# Patient Record
Sex: FEMALE | Employment: OTHER | URBAN - METROPOLITAN AREA
[De-identification: names, ages, dates, MRNs, and addresses within clinical notes are randomized per-mention and may not be internally consistent; named-entity substitution may affect disease eponyms.]

---

## 2017-02-20 ENCOUNTER — OFFICE VISIT (OUTPATIENT)
Dept: OPHTHALMOLOGY | Facility: CLINIC | Age: 77
End: 2017-02-20
Attending: OPHTHALMOLOGY
Payer: MEDICARE

## 2017-02-20 DIAGNOSIS — H40.1190 POAG (PRIMARY OPEN-ANGLE GLAUCOMA): Primary | ICD-10-CM

## 2017-02-20 PROCEDURE — 92083 EXTENDED VISUAL FIELD XM: CPT | Mod: ZF | Performed by: OPHTHALMOLOGY

## 2017-02-20 PROCEDURE — 99212 OFFICE O/P EST SF 10 MIN: CPT | Mod: 25,ZF

## 2017-02-20 ASSESSMENT — VISUAL ACUITY
CORRECTION_TYPE: GLASSES
OD_CC: 20/20
OS_CC: 20/20
METHOD: SNELLEN - LINEAR
OD_CC+: -2

## 2017-02-20 ASSESSMENT — SLIT LAMP EXAM - LIDS
COMMENTS: NORMAL
COMMENTS: NORMAL

## 2017-02-20 ASSESSMENT — CUP TO DISC RATIO
OD_RATIO: 0.7
OS_RATIO: 0.8

## 2017-02-20 ASSESSMENT — REFRACTION_WEARINGRX
OD_CYLINDER: +1.00
OD_SPHERE: -1.00
OS_CYLINDER: +0.50
OS_AXIS: 013
OS_SPHERE: +1.00
OS_ADD: +2.25
OD_AXIS: 160
OD_ADD: +2.25

## 2017-02-20 ASSESSMENT — CONF VISUAL FIELD
OD_NORMAL: 1
OS_NORMAL: 1

## 2017-02-20 ASSESSMENT — TONOMETRY
OD_IOP_MMHG: 06
IOP_METHOD: APPLANATION
OS_IOP_MMHG: 13

## 2017-02-20 NOTE — PROGRESS NOTES
Primary open angle glaucoma (POAG) moderate  Maximum intraocular pressure 24 per patient    Ocular surgeries:   Trabeculectomy mitomycin-C and Cataract extraction with intraocular lens right eye (6/2/2015)   Trabeculectomy mitomycin-C left eye 1-10-12 (1 minute mitomycin-C and 3 flap sutures)    Ocular Rx:  No gtts    OVF 24-2   High false + right eye, scatter  Left eye reliable with inferior arcuate and decreased MD (possible cataract effect)    Assessment :    1) Primary open angle glaucoma (POAG) adv left eye   Trabeculectomy mitomycin-C left eye 1-10-12 (1 minute mitomycin-C and 3 flap sutures)  Trabeculectomy mitomycin-C and Cataract extraction with intraocular lens right eye (6/3/2015)    2) Cataract, left eye-mild  Monitor for now      Plan  Continue to observe off drops  RV 6 months intraocular pressure check, dilation and OCT retinal nerve fiber layer     Attending Physician Attestation:  Complete documentation of historical and exam elements from today's encounter can be found in the full encounter summary report (not reduplicated in this progress note). I personally obtained the chief complaint(s) and history of present illness. I confirmed and edited asnecessary the review of systems, past medical/surgical history, family history, social history, and examination findings as documented by others; and I examined the patient myself. I personally reviewed the relevant tests, images, and reports as documented above. I formulated and edited as necessary the assessment and plan and discussed the findings and management plan with the patient and family.  - Karma Bustillos MD 10:56 AM 2/20/2017

## 2017-02-20 NOTE — MR AVS SNAPSHOT
After Visit Summary   2/20/2017    Marli Ponce    MRN: [id number]           Patient Information     Date Of Birth          1940        Visit Information        Provider Department      2/20/2017 9:45 AM Karma Bustillos MD [facility]        Today's Diagnoses     POAG (primary open-angle glaucoma)    -  1       Follow-ups after your visit        Follow-up notes from your care team     Return in about 6 months (around 8/20/2017) for OCT rnfl, dilation.      Your next 10 appointments already scheduled     May 10, 2017 11:10 AM CDT   (Arrive by 10:55 AM)   PHYSICAL with Ana María Rincon MD   [facility] ([facility])    [address]  4th Floor  [facility] [address] [phone]            Aug 21, 2017  9:30 AM CDT   RETURN GLAUCOMA with Karma Bustillos MD   Eye Clinic ([facility])    [address]  [address]  9th [address] Clin 9a  [facility] [address] [phone]              Who to contact     Please call your clinic at [phone] to:    Ask questions about your health    Make or cancel appointments    Discuss your medicines    Learn about your test results    Speak to your doctor   If you have compliments or concerns about an experience at your clinic, or if you wish to file a complaint, please contact [facility] Patient Relations at [phone] or email us at [email or web address]         Additional Information About Your Visit        MyChart Information     [employer] gives you secure access to your electronic health record. If you see a primary care provider, you can also send messages to your care team and make appointments. If you have questions, please call your primary care clinic.  If you do not have a primary care provider, please call [phone] and they will assist you.      Slingbox is an electronic gateway that provides easy, online access to your medical  "records. With [employer], you can request a clinic appointment, read your test results, renew a prescription or communicate with your care team.     To access your existing account, please contact your [facility] or call [phone] for assistance.        Care EveryWhere ID     This is your Care EveryWhere ID. This could be used by other organizations to access your [place] medical records  [id number]         Blood Pressure from Last 3 Encounters:   09/26/16 158/82   07/25/16 172/78   05/19/16 175/82    Weight from Last 3 Encounters:   09/26/16 61.7 kg (136 lb)   07/25/16 62 kg (136 lb 9.6 oz)   05/19/16 59.4 kg (131 lb)              We Performed the Following     OVF 24-2 Dynamic OU        Primary Care Provider Office Phone # Fax #    Ana María Rincon [phone]        PHYSICIANS [address]  [facility] [address]        Thank you!     Thank you for choosing EYE CLINIC  for your care. Our goal is always to provide you with excellent care. Hearing back from our patients is one way we can continue to improve our services. Please take a few minutes to complete the written survey that you may receive in the mail after your visit with us. Thank you!             Your Updated Medication List - Protect others around you: Learn how to safely use, store and throw away your medicines at www.disposemymeds.org.          This list is accurate as of: 2/20/17 11:19 AM.  Always use your most recent med list.                   Brand Name Dispense Instructions for use    ADULT NUTRITIONAL SUPPLEMENT OR      Taking \"Estrovera\"       amLODIPine 5 MG tablet    NORVASC    90 tablet    Take 1 tablet (5 mg) by mouth daily       BIOTIN FORTE PO      Take by mouth daily       CALCIUM + D PO      Take 2 tablets by mouth daily.       fluticasone 50 MCG/ACT spray    FLONASE    1 Bottle    Spray 1-2 sprays into both nostrils daily       levothyroxine 50 MCG tablet    " SYNTHROID/LEVOTHROID    90 tablet    Take 1 tablet (50 mcg) by mouth daily       lisinopril 20 MG tablet    PRINIVIL/ZESTRIL    180 tablet    Take 2 tablets (40 mg) by mouth daily       multivitamin per tablet      Take 1 tablet by mouth daily.       omeprazole 20 MG CR capsule    priLOSEC    90 capsule    Take 1 capsule (20 mg) by mouth daily       REFRESH OP      Apply to eye as needed       simvastatin 40 MG tablet    ZOCOR    45 tablet    Take 0.5 tablets (20 mg) by mouth At Bedtime

## 2017-02-20 NOTE — NURSING NOTE
Chief Complaints and History of Present Illnesses   Patient presents with     Follow Up For     Primary open angle glaucoma (POAG) moderate     HPI    Affected eye(s):  Both   Symptoms:        Duration:  6 months   Frequency:  Constant       Do you have eye pain now?:  No      Comments:  Pt. States that she is doing well.  No change in VA BE,  No c/o comfort BE.  Hollie Alfred COT 10:06 AM February 20, 2017

## 2017-05-10 ENCOUNTER — OFFICE VISIT (OUTPATIENT)
Dept: INTERNAL MEDICINE | Facility: CLINIC | Age: 77
End: 2017-05-10

## 2017-05-10 VITALS
DIASTOLIC BLOOD PRESSURE: 82 MMHG | WEIGHT: 130.6 LBS | RESPIRATION RATE: 20 BRPM | HEART RATE: 57 BPM | BODY MASS INDEX: 24.68 KG/M2 | SYSTOLIC BLOOD PRESSURE: 144 MMHG | OXYGEN SATURATION: 97 %

## 2017-05-10 DIAGNOSIS — Z12.31 ENCOUNTER FOR SCREENING MAMMOGRAM FOR BREAST CANCER: ICD-10-CM

## 2017-05-10 DIAGNOSIS — Z00.00 HEALTH CARE MAINTENANCE: Primary | ICD-10-CM

## 2017-05-10 DIAGNOSIS — Z00.00 HEALTH CARE MAINTENANCE: ICD-10-CM

## 2017-05-10 DIAGNOSIS — M81.0 OSTEOPOROSIS: ICD-10-CM

## 2017-05-10 DIAGNOSIS — E03.9 HYPOTHYROIDISM, UNSPECIFIED TYPE: ICD-10-CM

## 2017-05-10 LAB
CHOLEST SERPL-MCNC: 294 MG/DL
CREAT SERPL-MCNC: 0.95 MG/DL (ref 0.52–1.04)
GFR SERPL CREATININE-BSD FRML MDRD: 57 ML/MIN/1.7M2
HDLC SERPL-MCNC: 146 MG/DL
LDLC SERPL CALC-MCNC: 136 MG/DL
NONHDLC SERPL-MCNC: 149 MG/DL
POTASSIUM SERPL-SCNC: 4.9 MMOL/L (ref 3.4–5.3)
TRIGL SERPL-MCNC: 64 MG/DL
TSH SERPL DL<=0.005 MIU/L-ACNC: 0.95 MU/L (ref 0.4–4)

## 2017-05-10 ASSESSMENT — PAIN SCALES - GENERAL: PAINLEVEL: NO PAIN (0)

## 2017-05-10 NOTE — MR AVS SNAPSHOT
After Visit Summary   5/10/2017    Marli Ponce    MRN: [id number]           Patient Information     Date Of Birth          1940        Visit Information        Provider Department      5/10/2017 11:10 AM Ana María Rincon MD [facility]        Today's Diagnoses     Health care maintenance    -  1    Hypothyroidism, unspecified type        Encounter for screening mammogram for breast cancer        Osteoporosis          Care Instructions    Primary Care Center Medication Refill Request Information:  * Please contact your pharmacy regarding ANY request for medication refills.  ** [facility]ax = [phone]  * Please allow 3 business days for routine medication refills.  * Please allow 5 business days for controlled substance medication refills.     Primary Care Center Test Result notification information:  *You will be notified with in 7-10 days of your appointment day regarding the results of your test.  If you are on MyChart you will be notified as soon as the provider has reviewed the results and signed off on them.    Primary Care Center [phone]   LABS TODAY    Marli was seen today for physical and recheck medication.    Diagnoses and all orders for this visit:    Health care maintenance  -     Lipid panel reflex to direct LDL; Future  -     Creatinine; Future  -     Potassium; Future    Hypothyroidism, unspecified type  -     TSH with free T4 reflex; Standing    Encounter for screening mammogram for breast cancer  -     Mammogram, routine screening; Future    Osteoporosis  -     Dexa hip/pelvis/spine*; Future              Follow-ups after your visit        Your next 10 appointments already scheduled     May 10, 2017 11:45 AM CDT   LAB with  LAB    Health Lab ([facility])    [address] [place] [address] [phone]           Patient must bring picture ID.  Patient should be prepared to give a urine  specimen  Please do not eat 10-12 hours before your appointment if you are coming in fasting for labs on lipids, cholesterol, or glucose (sugar).  Pregnant women should follow their Care Team instructions. Water with medications is okay. Do not drink coffee or other fluids.   If you have concerns about taking  your medications, please ask at office or if scheduling via Betweenhart, send a message by clicking on Secure Messaging, Message Your Care Team.            Aug 11, 2017 10:30 AM CDT   DX HIP/PELVIS/SPINE with UCDX1   [facility] ([facility])    [address] [place] [place] [address] [phone]           Please do not take any of the following 48 hours prior to your exam: vitamins, calcium tablets, antacids.            Aug 11, 2017 11:15 AM CDT   (Arrive by 11:00 AM)   MA SCREENING DIGITAL BILATERAL with UCBCMA1   [facility] ([facility])    [address] [place] [address] [phone]           Do not use any powder, lotion or deodorant under your arms or on your breast. If you do, we will ask you to remove it before your exam.  Wear comfortable, two-piece clothing.  If you have any allergies, tell your care team.  Bring any previous mammograms from other facilities or have them mailed to the breast center. This mammogram location, [facility], now offers 3D mammography. It doesn't replace a screening mammogram and can be done with a regular screening mammogram. It is optional and not all insurances will pay for it. 3D mammography is a special kind of mammogram that produces a three-dimensional image of the breast by using low dose-xrays. 3D allows the radiologist to see the breast tissue differently from 2D, which reduces the chance of repeat testing due to overlapping breast tissue. If you are interested in have a 3D mammogram, please check with  your insurance before you arrive for your exam. 3D mammography is offered to all patients. On the day of your exam you will be asked to sign a form stating yes, you wish to have 3D imaging or, no, you decline.            Aug 21, 2017  9:30 AM CDT   RETURN GLAUCOMA with Karma Bustillos MD   Eye Clinic ([facility])    [address]  [address] Clin [address]  [facility] [address] [phone]              Future tests that were ordered for you today     Open Standing Orders        Priority Remaining Interval Expires Ordered    TSH with free T4 reflex Routine 4/4 annual 5/10/2020 5/10/2017          Open Future Orders        Priority Expected Expires Ordered    Dexa hip/pelvis/spine* Routine  5/10/2018 5/10/2017    Mammogram, routine screening Routine  5/10/2018 5/10/2017    Creatinine Routine 5/10/2017 5/10/2018 5/10/2017    Potassium Routine 5/10/2017 5/24/2017 5/10/2017    Lipid panel reflex to direct LDL Routine  5/11/2018 5/10/2017            Who to contact     Please call your clinic at [phone] to:    Ask questions about your health    Make or cancel appointments    Discuss your medicines    Learn about your test results    Speak to your doctor   If you have compliments or concerns about an experience at your clinic, or if you wish to file a complaint, please contact [facility] Patient Relations at [phone] or email us at [email or web address]         Additional Information About Your Visit        [employer] Information     Pad[employer] gives you secure access to your electronic health record. If you see a primary care provider, you can also send messages to your care team and make appointments. If you have questions, please call your primary care clinic.  If you do not have a primary care provider, please call [phone] and they will assist you.      PadMatcher is an electronic gateway that provides easy, online access to your medical records.  With [employer], you can request a clinic appointment, read your test results, renew a prescription or communicate with your care team.     To access your existing account, please contact your [facility] or call [phone] for assistance.        Care EveryWhere ID     This is your Care EveryWhere ID. This could be used by other organizations to access your [place] medical records  [id number]        Your Vitals Were     Pulse Respirations Pulse Oximetry BMI (Body Mass Index)          57 20 97% 24.68 kg/m2         Blood Pressure from Last 3 Encounters:   05/10/17 144/82   09/26/16 158/82   07/25/16 172/78    Weight from Last 3 Encounters:   05/10/17 59.2 kg (130 lb 9.6 oz)   09/26/16 61.7 kg (136 lb)   07/25/16 62 kg (136 lb 9.6 oz)                 Today's Medication Changes          These changes are accurate as of: 5/10/17 11:37 AM.  If you have any questions, ask your nurse or doctor.               Stop taking these medicines if you haven't already. Please contact your care team if you have questions.     simvastatin 40 MG tablet   Commonly known as:  ZOCOR   Stopped by:  Ana María Rincon MD                    Primary Care Provider Office Phone # Fax #    Ana María Rincon [phone]       PRIMARY CARE CLINIC [address] [place] [address]        Thank you!     Thank you for choosing [facility] PRIMARY CARE CLINIC  for your care. Our goal is always to provide you with excellent care. Hearing back from our patients is one way we can continue to improve our services. Please take a few minutes to complete the written survey that you may receive in the mail after your visit with us. Thank you!             Your Updated Medication List - Protect others around you: Learn how to safely use, store and throw away your medicines at www.disposemymeds.org.          This list is accurate as of: 5/10/17 11:37 AM.  Always use your most recent med list.              "      Brand Name Dispense Instructions for use    ADULT NUTRITIONAL SUPPLEMENT OR      Taking \"Estrovera\"       amLODIPine 5 MG tablet    NORVASC    90 tablet    Take 1 tablet (5 mg) by mouth daily       BIOTIN FORTE PO      Take by mouth daily       CALCIUM + D PO      Take 2 tablets by mouth daily.       levothyroxine 50 MCG tablet    SYNTHROID/LEVOTHROID    90 tablet    Take 1 tablet (50 mcg) by mouth daily       lisinopril 20 MG tablet    PRINIVIL/ZESTRIL    180 tablet    Take 2 tablets (40 mg) by mouth daily       multivitamin per tablet      Take 1 tablet by mouth daily.       omeprazole 20 MG CR capsule    priLOSEC    90 capsule    Take 1 capsule (20 mg) by mouth daily       REFRESH OP      Apply to eye as needed         "

## 2017-05-10 NOTE — PATIENT INSTRUCTIONS
[facility] Medication Refill Request Information:  * Please contact your pharmacy regarding ANY request for medication refills.  ** [facility] Prescription Fax = [phone]  * Please allow 3 business days for routine medication refills.  * Please allow 5 business days for controlled substance medication refills.     [facility] Test Result notification information:  *You will be notified with in 7-10 days of your appointment day regarding the results of your test.  If you are on MyChart you will be notified as soon as the provider has reviewed the results and signed off on them.    [facility] [phone]   LABS TODAY    Marli was seen today for physical and recheck medication.    Diagnoses and all orders for this visit:    Health care maintenance  -     Lipid panel reflex to direct LDL; Future  -     Creatinine; Future  -     Potassium; Future    Hypothyroidism, unspecified type  -     TSH with free T4 reflex; Standing    Encounter for screening mammogram for breast cancer  -     Mammogram, routine screening; Future    Osteoporosis  -     Dexa hip/pelvis/spine*; Future

## 2017-07-18 DIAGNOSIS — I10 HTN (HYPERTENSION): ICD-10-CM

## 2017-07-18 RX ORDER — LISINOPRIL 20 MG/1
40 TABLET ORAL DAILY
Qty: 180 TABLET | Refills: 2 | Status: SHIPPED | OUTPATIENT
Start: 2017-07-18 | End: 2018-04-29

## 2017-07-18 NOTE — TELEPHONE ENCOUNTER
lisinopril  20MG      Last Written Prescription Date: 7/19/16  Last Fill Quantity: 180, # refills: 3  Last Office Visit : 5/10/17  Next Clinic Visit: NONE    Potassium   Date Value Ref Range Status   05/10/2017 4.9 3.4 - 5.3 mmol/L Final     Creatinine   Date Value Ref Range Status   05/10/2017 0.95 0.52 - 1.04 mg/dL Final     BP Readings from Last 3 Encounters:   05/10/17 144/82   09/26/16 158/82   07/25/16 172/78

## 2017-07-29 DIAGNOSIS — E03.9 HYPOTHYROIDISM: ICD-10-CM

## 2017-07-29 RX ORDER — LEVOTHYROXINE SODIUM 50 UG/1
50 TABLET ORAL DAILY
Qty: 90 TABLET | Refills: 2 | Status: SHIPPED | OUTPATIENT
Start: 2017-07-29 | End: 2018-04-29

## 2017-08-01 ENCOUNTER — OFFICE VISIT (OUTPATIENT)
Dept: OBGYN | Facility: CLINIC | Age: 77
End: 2017-08-01
Attending: OBSTETRICS & GYNECOLOGY
Payer: MEDICARE

## 2017-08-01 VITALS
SYSTOLIC BLOOD PRESSURE: 129 MMHG | HEIGHT: 61 IN | BODY MASS INDEX: 24.64 KG/M2 | HEART RATE: 76 BPM | DIASTOLIC BLOOD PRESSURE: 75 MMHG | WEIGHT: 130.5 LBS

## 2017-08-01 DIAGNOSIS — Z01.419 ENCOUNTER FOR GYNECOLOGICAL EXAMINATION WITHOUT ABNORMAL FINDING: ICD-10-CM

## 2017-08-01 PROCEDURE — 99212 OFFICE O/P EST SF 10 MIN: CPT | Mod: ZF

## 2017-08-01 ASSESSMENT — PAIN SCALES - GENERAL: PAINLEVEL: NO PAIN (0)

## 2017-08-01 NOTE — MR AVS SNAPSHOT
After Visit Summary   8/1/2017    Marli Ponce    MRN: [id number]           Patient Information     Date Of Birth          1940        Visit Information        Provider Department      8/1/2017 10:00 AM Cami Hearn MD [facility]        Today's Diagnoses     Encounter for gynecological examination without abnormal finding           Follow-ups after your visit        Follow-up notes from your care team     Return in 1 year (on 8/1/2018) for Preventative Health Visit.      Your next 10 appointments already scheduled     Aug 11, 2017 10:30 AM CDT   DX HIP/PELVIS/SPINE with UCDX1   [facility] ([facility])    [address] [place] [id number] [phone]           Please do not take any of the following 24 hours prior to the day of your exam: vitamins, calcium tablets, antacids.  If possible, please wear clothes without metal (snaps, zippers). A sweatsuit works well.            Aug 11, 2017 11:15 AM CDT   (Arrive by 11:00 AM)   MA SCREENING DIGITAL BILATERAL with UCBCMA1   [facility] ([facility])    [address] [place] [id number] [phone]           Do not use any powder, lotion or deodorant under your arms or on your breast. If you do, we will ask you to remove it before your exam.  Wear comfortable, two-piece clothing.  If you have any allergies, tell your care team.  Bring any previous mammograms from other facilities or have them mailed to the breast center. Three-dimensional (3D) mammograms are available at [place] locations in [facility], and [place]. [facility] locations include [facility] in [place]. Benefits of 3D mammograms include: - Improved rate of cancer detection - Decreases your chance of having to go back for more tests, which  "means fewer: - \"False-positive\" results (This means that there is an abnormal area but it isn't cancer.) - Invasive testing procedures, such as a biopsy or surgery - Can provide clearer images of the breast if you have dense breast tissue. 3D mammography is an optional exam that anyone can have with a 2D mammogram. It doesn't replace or take the place of a 2D mammogram. 2D mammograms remain an effective screening test for all women.  Not all insurance companies cover the cost of a 3D mammogram. Check with your insurance.            Aug 30, 2017  1:30 PM CDT   RETURN GLAUCOMA with Karma Bustillos MD   Eye Clinic ([facility])    [address]  [address]  9th Fl Clin 9a  [facility] [address] [phone]              Who to contact     Please call your clinic at [phone] to:    Ask questions about your health    Make or cancel appointments    Discuss your medicines    Learn about your test results    Speak to your doctor   If you have compliments or concerns about an experience at your clinic, or if you wish to file a complaint, please contact [facility] Patient Relations at [phone] or email us at [email or web address]         Additional Information About Your Visit        Ingenico Information     In[employer] gives you secure access to your electronic health record. If you see a primary care provider, you can also send messages to your care team and make appointments. If you have questions, please call your primary care clinic.  If you do not have a primary care provider, please call [phone] and they will assist you.      Ingenico is an electronic gateway that provides easy, online access to your medical records. With In[employer]ico, you can request a clinic appointment, read your test results, renew a prescription or communicate with your care team.     To access your existing account, please contact your [facility] or " "call [phone] for assistance.        Care EveryWhere ID     This is your Care EveryWhere ID. This could be used by other organizations to access your [place] medical records  [id number]        Your Vitals Were     Pulse Height BMI (Body Mass Index)             76 1.549 m (5' 1\") 24.66 kg/m2          Blood Pressure from Last 3 Encounters:   08/01/17 129/75   05/10/17 144/82   09/26/16 158/82    Weight from Last 3 Encounters:   08/01/17 59.2 kg (130 lb 8 oz)   05/10/17 59.2 kg (130 lb 9.6 oz)   09/26/16 61.7 kg (136 lb)              We Performed the Following     Pelvic and Breast Exam Procedure []        Primary Care Provider Office Phone # Fax #    Ana María Rincon [phone]       PRIMARY CARE CLINIC [address] [address]        Equal Access to Services     KEITH KELLEY : Hadii aad ku hadasho Soomaali, waaxda luqadaha, qaybta kaalmada adeegyada, waxay lathain hayshruthin rossy matthews . So [facility] [phone].    ATENCIÓN: Si tello donis, tiene a tate disposición servicios gratuitos de asistencia lingüística. Llame al [phone].    We comply with applicable federal civil rights laws and [place] laws. We do not discriminate on the basis of race, color, national origin, age, disability sex, sexual orientation or gender identity.            Thank you!     Thank you for choosing [facility]  for your care. Our goal is always to provide you with excellent care. Hearing back from our patients is one way we can continue to improve our services. Please take a few minutes to complete the written survey that you may receive in the mail after your visit with us. Thank you!             Your Updated Medication List - Protect others around you: Learn how to safely use, store and throw away your medicines at www.disposemymeds.org.          This list is accurate as of: 8/1/17 11:59 PM.  Always use your most recent med list.                   Brand Name " "Dispense Instructions for use Diagnosis    ADULT NUTRITIONAL SUPPLEMENT OR      Taking \"Estrovera\"        amLODIPine 5 MG tablet    NORVASC    90 tablet    Take 1 tablet (5 mg) by mouth daily    HTN (hypertension)       BIOTIN FORTE PO      Take by mouth daily        CALCIUM + D PO      Take 2 tablets by mouth daily.        levothyroxine 50 MCG tablet    SYNTHROID/LEVOTHROID    90 tablet    Take 1 tablet (50 mcg) by mouth daily    Hypothyroidism       lisinopril 20 MG tablet    PRINIVIL/ZESTRIL    180 tablet    Take 2 tablets (40 mg) by mouth daily    HTN (hypertension)       multivitamin per tablet      Take 1 tablet by mouth daily.        omeprazole 20 MG CR capsule    priLOSEC    90 capsule    Take 1 capsule (20 mg) by mouth daily    Esophageal reflux       REFRESH OP      Apply to eye as needed          "

## 2017-08-01 NOTE — LETTER
"8/1/2017       RE: Marli Ponce  [address]  [facility] [address]     Dear Colleague,    Thank you for referring your patient, Marli Ponce, to the [facility] at [facility]. Please see a copy of my visit note below.      Progress Note    SUBJECTIVE:  Marli Ponce is an 77 year old postmenopausal  who requests a breast and pelvic exam.  She is doing well today with no complaints.  Her menopausal symptoms have largely resolved.  She has not had any abnormal bleeding.      Patient is followed by Dr. Rincon for primary care.    Concerns today include: none    Menstrual History:  Menstrual History 6/7/2013   Reviewed Today Yes   Comments Menopause       Last    Lab Results   Component Value Date    PAP NIL 06/29/2012     History of abnormal Pap smear: NO - age 63 - see link Cervical Cytology Screening Guidelines      Last No results found for: HPV16  Last No results found for: HPV18  Last No results found for: HRHPV    Mammogram current: mammogram and DEXA are scheduled at the [facility]    HISTORY:  Prescription Medications as of 8/8/2017             levothyroxine (SYNTHROID/LEVOTHROID) 50 MCG tablet Take 1 tablet (50 mcg) by mouth daily    lisinopril (PRINIVIL/ZESTRIL) 20 MG tablet Take 2 tablets (40 mg) by mouth daily    BIOTIN FORTE PO Take by mouth daily    Polyvinyl Alcohol-Povidone (REFRESH OP) Apply to eye as needed    omeprazole (PRILOSEC) 20 MG capsule Take 1 capsule (20 mg) by mouth daily    Nutritional Supplements (ADULT NUTRITIONAL SUPPLEMENT OR) Taking \"Estrovera\"    amLODIPine (NORVASC) 5 MG tablet Take 1 tablet (5 mg) by mouth daily    Calcium Carbonate-Vitamin D (CALCIUM + D PO) Take 2 tablets by mouth daily.    Multiple Vitamin (MULTIVITAMIN) per tablet Take 1 tablet by mouth daily.        Allergies   Allergen Reactions     Cosopt [Dorzolamide-Timolol]      No effect       Lumigan      FBS, REDNESS     Xalatan [Latanoprost]  "     No effect       Immunization History   Administered Date(s) Administered     Hepatitis A Vac Ped/Adol-2 Dose 2005     Influenza (IIV3) 2008, 10/27/2011, 2012, 10/06/2013, 10/05/2014     Meningococcal (Menomune ) 2005     Pneumococcal (PCV 13) 2015     Pneumococcal 23 valent 2003, 2014     Poliovirus, inactivated (IPV) 2005     TD (ADULT, 7+) 2004     TDAP Vaccine (Boostrix) 2013     Tdap (Adacel,Boostrix) 2013     Typhoid IM 2005     Yellow Fever 2005     Zoster vaccine, live 2013       Obstetric History       T0      L5     SAB0   TAB0   Ectopic0   Multiple0   Live Births0      Past Medical History:   Diagnosis Date     Cataract      Hyperlipidemia LDL goal < 130      Hypertension     white coat; home blood pressure monitoring 2016  average systolic blood pressure approximately 115.     Hypothyroidism      Ocular hypertension      Primary open-angle glaucoma     adv     Past Surgical History:   Procedure Laterality Date     CHOLECYSTECTOMY       LAPAROSCOPIC APPENDECTOMY       LASER IRIDOTOMY OD (RIGHT EYE)      RE/LE  pre 2003     LASER IRIDOTOMY OS (LEFT EYE)  2010     SELECTIVE LASER TRABECULOPLASTY (SLT) OS (LEFT EYE)  2010    LE     TRABECULECTOMY, MITOMYCIN FILTER, COMBINED  1/10/2012    LE     Family History   Problem Relation Age of Onset     CANCER Mother 78     stomach and  at 80     Hypertension Mother      Glaucoma Mother      Breast Cancer Sister 38     still living     Hypertension Father      CEREBROVASCULAR DISEASE Father 52     Glaucoma Maternal Grandfather      Social History     Social History     Marital status:      Spouse name: N/A     Number of children: N/A     Years of education: N/A     Social History Main Topics     Smoking status: Never Smoker     Smokeless tobacco: Never Used     Alcohol use 0.0 oz/week     0 Standard drinks or equivalent per week       "Comment: rare tequilla     Drug use: No     Sexual activity: Yes     Partners: Male      Comment: 1962     Other Topics Concern     None     Social History Narrative    Homemaker has 5 children, 7 grand, 3 greatgrandHow much exercise per week? DailyHow much calcium per day? Supplement   How much caffeine per day? NoHow much vitamin D per day? SupplementDo you/your family wear seatbelts?  YesDo you/your family use safety helmets? NoDo you/your family use sunscreen? YesDo you/your family keep firearms in the home? YesDo you/your family have a smoke detector(s)? YesDo you feel safe in your home? YesHas anyone ever touched you in an unwanted manner? No Explain         How much exercise per week? 5     How much calcium per day? daily       How much caffeine per day? none    How much vitamin D per day? Supplement    Do you/your family wear seatbelts?  Yes    Do you/your family use safety helmets? No    Do you/your family use sunscreen? Yes    Do you/your family keep firearms in the home? Yes    Do you/your family have a smoke detector(s)? Yes        Do you feel safe in your home? Yes    Has anyone ever touched you in an unwanted manner? No     Explain     July 16, 2015 Lauren León LPN    Reviewed Gema DOLAN MA 8/1/2017                   ROS    EXAM:  Blood pressure 129/75, pulse 76, height 1.549 m (5' 1\"), weight 59.2 kg (130 lb 8 oz), not currently breastfeeding. Body mass index is 24.66 kg/(m^2).  General appearance: Pleasant female in no acute distress.     BREAST EXAM:  Breast: Without visible skin changes. No dimpling or lesions seen.   Breasts supple, non-tender with palpation, no dominant mass, nodularity, or nipple discharge noted bilaterally. Axillary nodes negative.      PELVIC EXAM:  EG/BUS: Normal genital architecture without lesions, erythema or abnormal secretions Bartholin's, Urethra, [place]'s normal   Urethral meatus: normal   Urethra: no masses, tenderness, or scarring   Bladder: no masses or tenderness "   Vagina: atrophic with scant physiologic secretions  Cervix: Multiparous, and no lesions  Uterus: midposition, and small, smooth, firm, mobile w/o pain  Adnexa: Within normal limits and No masses, nodularity, tenderness  Rectum:anus normal     ASSESSMENT:  Encounter Diagnosis   Name Primary?     Encounter for gynecological examination without abnormal finding       77 year old Female Pelvic and Breast Exam    PLAN:   Orders Placed This Encounter   Procedures     Pelvic and Breast Exam Procedure []     Return in one year/PRN for preventive care or problems/concerns.     Verbalized understanding and agreement with visit plan.  Cami Hearn MD, FACOG

## 2017-08-08 NOTE — PROGRESS NOTES
"  Progress Note    SUBJECTIVE:  Marli Ponce is an 77 year old postmenopausal  who requests a breast and pelvic exam.  She is doing well today with no complaints.  Her menopausal symptoms have largely resolved.  She has not had any abnormal bleeding.      Patient is followed by Dr. Rincon for primary care.    Concerns today include: none    Menstrual History:  Menstrual History 6/7/2013   Reviewed Today Yes   Comments Menopause       Last    Lab Results   Component Value Date    PAP NIL 06/29/2012     History of abnormal Pap smear: NO - age 63 - see link Cervical Cytology Screening Guidelines      Last No results found for: HPV16  Last No results found for: HPV18  Last No results found for: HRHPV    Mammogram current: mammogram and DEXA are scheduled at the [facility]    HISTORY:  Prescription Medications as of 8/8/2017             levothyroxine (SYNTHROID/LEVOTHROID) 50 MCG tablet Take 1 tablet (50 mcg) by mouth daily    lisinopril (PRINIVIL/ZESTRIL) 20 MG tablet Take 2 tablets (40 mg) by mouth daily    BIOTIN FORTE PO Take by mouth daily    Polyvinyl Alcohol-Povidone (REFRESH OP) Apply to eye as needed    omeprazole (PRILOSEC) 20 MG capsule Take 1 capsule (20 mg) by mouth daily    Nutritional Supplements (ADULT NUTRITIONAL SUPPLEMENT OR) Taking \"Estrovera\"    amLODIPine (NORVASC) 5 MG tablet Take 1 tablet (5 mg) by mouth daily    Calcium Carbonate-Vitamin D (CALCIUM + D PO) Take 2 tablets by mouth daily.    Multiple Vitamin (MULTIVITAMIN) per tablet Take 1 tablet by mouth daily.        Allergies   Allergen Reactions     Cosopt [Dorzolamide-Timolol]      No effect       Lumigan      FBS, REDNESS     Xalatan [Latanoprost]      No effect       Immunization History   Administered Date(s) Administered     Hepatitis A Vac Ped/Adol-2 Dose 07/26/2005     Influenza (IIV3) 12/03/2008, 10/27/2011, 08/26/2012, 10/06/2013, 10/05/2014     Meningococcal (Menomune ) 07/26/2005     Pneumococcal (PCV 13) 04/22/2015     " Pneumococcal 23 valent 2003, 2014     Poliovirus, inactivated (IPV) 2005     TD (ADULT, 7+) 2004     TDAP Vaccine (Boostrix) 2013     Tdap (Adacel,Boostrix) 2013     Typhoid IM 2005     Yellow Fever 2005     Zoster vaccine, live 2013       Obstetric History       T0      L5     SAB0   TAB0   Ectopic0   Multiple0   Live Births0      Past Medical History:   Diagnosis Date     Cataract      Hyperlipidemia LDL goal < 130      Hypertension     white coat; home blood pressure monitoring 2016  average systolic blood pressure approximately 115.     Hypothyroidism      Ocular hypertension      Primary open-angle glaucoma     adv     Past Surgical History:   Procedure Laterality Date     CHOLECYSTECTOMY       LAPAROSCOPIC APPENDECTOMY       LASER IRIDOTOMY OD (RIGHT EYE)      RE/LE  pre      LASER IRIDOTOMY OS (LEFT EYE)  2010     SELECTIVE LASER TRABECULOPLASTY (SLT) OS (LEFT EYE)  2010    LE     TRABECULECTOMY, MITOMYCIN FILTER, COMBINED  1/10/2012    LE     Family History   Problem Relation Age of Onset     CANCER Mother 78     stomach and  at 80     Hypertension Mother      Glaucoma Mother      Breast Cancer Sister 38     still living     Hypertension Father      CEREBROVASCULAR DISEASE Father 52     Glaucoma Maternal Grandfather      Social History     Social History     Marital status:      Spouse name: N/A     Number of children: N/A     Years of education: N/A     Social History Main Topics     Smoking status: Never Smoker     Smokeless tobacco: Never Used     Alcohol use 0.0 oz/week     0 Standard drinks or equivalent per week      Comment: rare tequilla     Drug use: No     Sexual activity: Yes     Partners: Male      Comment: 1962     Other Topics Concern     None     Social History Narrative    Homemaker has 5 children, 7 grand, 3 greatgrandHow much exercise per week? DailyHow much calcium per day? Supplement   How  "much caffeine per day? NoHow much vitamin D per day? SupplementDo you/your family wear seatbelts?  YesDo you/your family use safety helmets? NoDo you/your family use sunscreen? YesDo you/your family keep firearms in the home? YesDo you/your family have a smoke detector(s)? YesDo you feel safe in your home? YesHas anyone ever touched you in an unwanted manner? No Explain         How much exercise per week? 5     How much calcium per day? daily       How much caffeine per day? none    How much vitamin D per day? Supplement    Do you/your family wear seatbelts?  Yes    Do you/your family use safety helmets? No    Do you/your family use sunscreen? Yes    Do you/your family keep firearms in the home? Yes    Do you/your family have a smoke detector(s)? Yes        Do you feel safe in your home? Yes    Has anyone ever touched you in an unwanted manner? No     Explain     July 16, 2015 Lauren León LPN    Reviewed Gema DOLAN MA 8/1/2017                   ROS    EXAM:  Blood pressure 129/75, pulse 76, height 1.549 m (5' 1\"), weight 59.2 kg (130 lb 8 oz), not currently breastfeeding. Body mass index is 24.66 kg/(m^2).  General appearance: Pleasant female in no acute distress.     BREAST EXAM:  Breast: Without visible skin changes. No dimpling or lesions seen.   Breasts supple, non-tender with palpation, no dominant mass, nodularity, or nipple discharge noted bilaterally. Axillary nodes negative.      PELVIC EXAM:  EG/BUS: Normal genital architecture without lesions, erythema or abnormal secretions Bartholin's, Urethra, [place]'s normal   Urethral meatus: normal   Urethra: no masses, tenderness, or scarring   Bladder: no masses or tenderness   Vagina: atrophic with scant physiologic secretions  Cervix: Multiparous, and no lesions  Uterus: midposition, and small, smooth, firm, mobile w/o pain  Adnexa: Within normal limits and No masses, nodularity, tenderness  Rectum:anus normal       ASSESSMENT:  Encounter Diagnosis   Name Primary? "     Encounter for gynecological examination without abnormal finding       77 year old Female Pelvic and Breast Exam    PLAN:   Orders Placed This Encounter   Procedures     Pelvic and Breast Exam Procedure []       Return in one year/PRN for preventive care or problems/concerns.     Verbalized understanding and agreement with visit plan.    Cami Hearn MD, FACOG

## 2017-08-16 DIAGNOSIS — M85.9 LOW BONE DENSITY: ICD-10-CM

## 2017-08-16 DIAGNOSIS — M85.9 LOW BONE DENSITY: Primary | ICD-10-CM

## 2017-08-16 DIAGNOSIS — M81.0 AGE RELATED OSTEOPOROSIS, UNSPECIFIED PATHOLOGICAL FRACTURE PRESENCE: Primary | ICD-10-CM

## 2017-08-16 RX ORDER — ALENDRONATE SODIUM 70 MG/1
70 TABLET ORAL
Qty: 4 TABLET | Refills: 3 | Status: SHIPPED | OUTPATIENT
Start: 2017-08-16 | End: 2017-09-21 | Stop reason: SINTOL

## 2017-08-30 ENCOUNTER — OFFICE VISIT (OUTPATIENT)
Dept: OPHTHALMOLOGY | Facility: CLINIC | Age: 77
End: 2017-08-30
Attending: OPHTHALMOLOGY
Payer: MEDICARE

## 2017-08-30 DIAGNOSIS — H40.1190 POAG (PRIMARY OPEN-ANGLE GLAUCOMA): Primary | ICD-10-CM

## 2017-08-30 DIAGNOSIS — H25.12 AGE-RELATED NUCLEAR CATARACT OF LEFT EYE: ICD-10-CM

## 2017-08-30 PROCEDURE — 99213 OFFICE O/P EST LOW 20 MIN: CPT | Mod: 25,ZF

## 2017-08-30 PROCEDURE — 92015 DETERMINE REFRACTIVE STATE: CPT | Mod: GY,ZF

## 2017-08-30 PROCEDURE — 92133 CPTRZD OPH DX IMG PST SGM ON: CPT | Mod: ZF | Performed by: OPHTHALMOLOGY

## 2017-08-30 ASSESSMENT — REFRACTION_WEARINGRX
OS_SPHERE: +1.00
OD_SPHERE: -1.00
OS_CYLINDER: +0.50
OD_ADD: +2.25
OS_AXIS: 013
OD_AXIS: 160
OD_CYLINDER: +1.00
OS_ADD: +2.25

## 2017-08-30 ASSESSMENT — VISUAL ACUITY
METHOD_MR: OPPOSITE SIGNS CORRECT.
OS_CC: 20/30
OD_CC+: -2
METHOD: SNELLEN - LINEAR
CORRECTION_TYPE: GLASSES
OS_PH_CC: 20/25
OD_CC: 20/25

## 2017-08-30 ASSESSMENT — TONOMETRY
IOP_METHOD: APPLANATION
IOP_METHOD: APPLANATION
OD_IOP_MMHG: 08
OS_IOP_MMHG: 16
OS_IOP_MMHG: 16
OD_IOP_MMHG: 09

## 2017-08-30 ASSESSMENT — CUP TO DISC RATIO
OS_RATIO: 0.8
OD_RATIO: 0.7

## 2017-08-30 ASSESSMENT — SLIT LAMP EXAM - LIDS
COMMENTS: NORMAL
COMMENTS: NORMAL

## 2017-08-30 ASSESSMENT — REFRACTION_MANIFEST
OS_ADD: +2.75
OD_AXIS: 170
OS_CYLINDER: +1.50
OD_ADD: +2.75
OS_AXIS: 010
OD_SPHERE: -0.75
OD_CYLINDER: +1.75
OS_SPHERE: +0.75

## 2017-08-30 ASSESSMENT — CONF VISUAL FIELD
METHOD: COUNTING FINGERS
OS_NORMAL: 1
OD_NORMAL: 1

## 2017-08-30 NOTE — NURSING NOTE
Chief Complaints and History of Present Illnesses   Patient presents with     Glaucoma Follow Up     6 month follow up both eyes.     HPI    Affected eye(s):  Both   Symptoms:     No floaters   No flashes   No redness   No Dryness         Do you have eye pain now?:  No      Comments:  Pt having difficulty reading small print even with glasses.    Kassidy SMART August 30, 2017 1:43 PM

## 2017-08-30 NOTE — PROGRESS NOTES
Primary open angle glaucoma (POAG) moderate  Maximum intraocular pressure 24 per patient    Vision stable except getting harder to read things up close in the left eye. Eyes comfortable. No drops    Ocular surgeries:   Trabeculectomy mitomycin-C and Cataract extraction with intraocular lens right eye (6/2/2015)   Trabeculectomy mitomycin-C left eye 1-10-12 (1 minute mitomycin-C and 3 flap sutures)    Ocular Rx:  No gtts    OCT RNFL   OD significant thinning compared to 3/2014 (pt had phaco/trab OD in 6/2015)   OS stable compared to 3/2014    Assessment :    1) Primary open angle glaucoma (POAG) adv left eye   Trabeculectomy mitomycin-C left eye 1-10-12 (1 minute mitomycin-C and 3 flap sutures)  Trabeculectomy mitomycin-C and Cataract extraction with intraocular lens right eye (6/3/2015)    2) Cataract, left eye-mild  Likely visually significant  Pt prefers to monitor for now and requests updated MRx    Plan  Continue to observe off drops  Dispensed updated MRx today  RV 6 months intraocular pressure check and 24-2 both eyes    Attending Physician Attestation:  Complete documentation of historical and exam elements from today's encounter can be found in the full encounter summary report (not reduplicated in this progress note). I personally obtained the chief complaint(s) and history of present illness. I confirmed and edited asnecessary the review of systems, past medical/surgical history, family history, social history, and examination findings as documented by others; and I examined the patient myself. I personally reviewed the relevant tests, images, and reports as documented above. I formulated and edited as necessary the assessment and plan and discussed the findings and management plan with the patient and family.  - Karma Bustillos MD 2:37 PM 8/30/2017

## 2017-08-30 NOTE — MR AVS SNAPSHOT
After Visit Summary   8/30/2017    Marli Ponce    MRN: [id number]           Patient Information     Date Of Birth          1940        Visit Information        Provider Department      8/30/2017 1:30 PM Karma Bustillos MD [facility]        Today's Diagnoses     POAG (primary open-angle glaucoma)    -  1    Age-related nuclear cataract of left eye           Follow-ups after your visit        Follow-up notes from your care team     Return in about 6 months (around 2/28/2018) for visual field 24-2.      Your next 10 appointments already scheduled     Sep 21, 2017  1:15 PM CDT   (Arrive by 1:00 PM)   RETURN HIP with Negro Williamson MD   [facility] ([facility])    [address]  4th Floor  [facility] [id number] [phone]            Feb 28, 2018 12:00 PM CST   VISUAL FIELD with [facility] EYE VISUAL FIELD   Eye Clinic ([facility])    [address]  [address]  [facility] [address] [phone]            Feb 28, 2018 12:30 PM CST   RETURN GLAUCOMA with Karma Bustillos MD   Eye Clinic ([facility])    [address]  [address]  [facility] [address] [phone]              Who to contact     Please call your clinic at [phone] to:    Ask questions about your health    Make or cancel appointments    Discuss your medicines    Learn about your test results    Speak to your doctor   If you have compliments or concerns about an experience at your clinic, or if you wish to file a complaint, please contact [facility] Patient Relations at [phone] or email us at [email or web address]         Additional Information About Your Visit        MyChart Information     [employer] gives you secure access to your electronic health record. If you see a primary care provider, you can also send messages to your care team and make  appointments. If you have questions, please call your primary care clinic.  If you do not have a primary care provider, please call [phone] and they will assist you.      [employer] is an electronic gateway that provides easy, online access to your medical records. With [employer], you can request a clinic appointment, read your test results, renew a prescription or communicate with your care team.     To access your existing account, please contact your [facility] or call [phone] for assistance.        Care EveryWhere ID     This is your Care EveryWhere ID. This could be used by other organizations to access your [facility] medical records  [id number]         Blood Pressure from Last 3 Encounters:   08/01/17 129/75   05/10/17 144/82   09/26/16 158/82    Weight from Last 3 Encounters:   08/01/17 59.2 kg (130 lb 8 oz)   05/10/17 59.2 kg (130 lb 9.6 oz)   09/26/16 61.7 kg (136 lb)              We Performed the Following     OCT Optic Nerve RNFL Spectralis OU (both eyes)        Primary Care Provider Office Phone # Fax #    Ana María Rincon [phone] [address] [address]        Equal Access to Services     KEITH KELLEY : Hadii aad ku hadasho Soomaali, waaxda luqadaha, qaybta kaalmada adeegyada, waxay regino hayjayro bhat. So [facility] [phone].    ATENCIÓN: Si habla español, tiene a tate disposición servicios gratuitos de asistencia lingüística. Llame al [phone].    We comply with applicable federal civil rights laws and [place] laws. We do not discriminate on the basis of race, color, national origin, age, disability sex, sexual orientation or gender identity.            Thank you!     Thank you for choosing EYE CLINIC  for your care. Our goal is always to provide you with excellent care. Hearing back from our patients is one way we can continue to improve our services. Please take a few minutes to complete the  "written survey that you may receive in the mail after your visit with us. Thank you!             Your Updated Medication List - Protect others around you: Learn how to safely use, store and throw away your medicines at www.disposemymeds.org.          This list is accurate as of: 8/30/17  2:43 PM.  Always use your most recent med list.                   Brand Name Dispense Instructions for use Diagnosis    ADULT NUTRITIONAL SUPPLEMENT OR      Taking \"Estrovera\"        alendronate 70 MG tablet    FOSAMAX    4 tablet    Take 1 tablet (70 mg) by mouth every 7 days    Age related osteoporosis, unspecified pathological fracture presence       amLODIPine 5 MG tablet    NORVASC    90 tablet    Take 1 tablet (5 mg) by mouth daily    HTN (hypertension)       BIOTIN FORTE PO      Take by mouth daily        CALCIUM + D PO      Take 2 tablets by mouth daily.        calcium 600-200 MG-UNIT Tabs     180 tablet    Take 1 tablet by mouth 2 times daily        levothyroxine 50 MCG tablet    SYNTHROID/LEVOTHROID    90 tablet    Take 1 tablet (50 mcg) by mouth daily    Hypothyroidism       lisinopril 20 MG tablet    PRINIVIL/ZESTRIL    180 tablet    Take 2 tablets (40 mg) by mouth daily    HTN (hypertension)       multivitamin per tablet      Take 1 tablet by mouth daily.        omeprazole 20 MG CR capsule    priLOSEC    90 capsule    Take 1 capsule (20 mg) by mouth daily    Esophageal reflux       REFRESH OP      Apply to eye as needed          "

## 2017-09-06 ENCOUNTER — DOCUMENTATION ONLY (OUTPATIENT)
Dept: OTHER | Facility: CLINIC | Age: 77
End: 2017-09-06

## 2017-09-06 PROBLEM — Z71.89 ACP (ADVANCE CARE PLANNING): Chronic | Status: ACTIVE | Noted: 2017-09-06

## 2017-09-19 DIAGNOSIS — M25.551 HIP PAIN, RIGHT: Primary | ICD-10-CM

## 2017-09-21 ENCOUNTER — OFFICE VISIT (OUTPATIENT)
Dept: ORTHOPEDICS | Facility: CLINIC | Age: 77
End: 2017-09-21

## 2017-09-21 ENCOUNTER — TELEPHONE (OUTPATIENT)
Dept: INTERNAL MEDICINE | Facility: CLINIC | Age: 77
End: 2017-09-21

## 2017-09-21 ENCOUNTER — MYC MEDICAL ADVICE (OUTPATIENT)
Dept: INTERNAL MEDICINE | Facility: CLINIC | Age: 77
End: 2017-09-21

## 2017-09-21 VITALS — BODY MASS INDEX: 25.82 KG/M2 | HEIGHT: 60 IN | WEIGHT: 131.5 LBS

## 2017-09-21 DIAGNOSIS — M81.0 AGE-RELATED OSTEOPOROSIS WITHOUT CURRENT PATHOLOGICAL FRACTURE: Primary | ICD-10-CM

## 2017-09-21 DIAGNOSIS — M25.551 CHRONIC PAIN OF RIGHT HIP: Primary | ICD-10-CM

## 2017-09-21 DIAGNOSIS — G89.29 CHRONIC PAIN OF RIGHT HIP: Primary | ICD-10-CM

## 2017-09-21 ASSESSMENT — ACTIVITIES OF DAILY LIVING (ADL)
ADL_MEAN: 2
ADL_SUBSCALE_SCORE: 50
ADL_SUM: 34

## 2017-09-21 ASSESSMENT — HOOS S4: HOW SEVERE IS YOUR HIP JOINT STIFFNESS AFTER FIRST WAKENING IN THE MORNING?: MODERATE

## 2017-09-21 NOTE — NURSING NOTE
"Reason For Visit:   Chief Complaint   Patient presents with     RECHECK     Pt. states that she is here today for Right Hip Pain. She mention that a week ago, she walked 4 miles up hills and thats when she started noticed incraes of pain from her hip. She had tried Physical Therapy in the past, effective.        Pain Assessment  Patient Currently in Pain: Yes  Primary Pain Location: Hip  Pain Orientation: Right  Pain Descriptors: Discomfort  Alleviating Factors: Rest, NSAIDS  Aggravating Factors: Movement, Standing, Walking, Sitting               HEIGHT: 5' 0\", WEIGHT: 131 lbs 8 oz, BMI: Body mass index is 25.68 kg/(m^2).      Current Outpatient Prescriptions   Medication Sig Dispense Refill     calcium 600-200 MG-UNIT TABS Take 1 tablet by mouth 2 times daily 180 tablet 3     levothyroxine (SYNTHROID/LEVOTHROID) 50 MCG tablet Take 1 tablet (50 mcg) by mouth daily 90 tablet 2     lisinopril (PRINIVIL/ZESTRIL) 20 MG tablet Take 2 tablets (40 mg) by mouth daily 180 tablet 2     BIOTIN FORTE PO Take by mouth daily       Polyvinyl Alcohol-Povidone (REFRESH OP) Apply to eye as needed       omeprazole (PRILOSEC) 20 MG capsule Take 1 capsule (20 mg) by mouth daily 90 capsule 1     Nutritional Supplements (ADULT NUTRITIONAL SUPPLEMENT OR) Taking \"Estrovera\"       amLODIPine (NORVASC) 5 MG tablet Take 1 tablet (5 mg) by mouth daily 90 tablet 3     Calcium Carbonate-Vitamin D (CALCIUM + D PO) Take 2 tablets by mouth daily.       Multiple Vitamin (MULTIVITAMIN) per tablet Take 1 tablet by mouth daily.            Allergies   Allergen Reactions     Cosopt [Dorzolamide-Timolol]      No effect       Lumigan      FBS, REDNESS     Xalatan [Latanoprost]      No effect                 "

## 2017-09-21 NOTE — PROGRESS NOTES
Patient returns today to discuss her hip. She continues to have latera hip pain. She has done some physical therapy. She reports that she somewhat improved and she is able to walk up to 5 miles at a time. She continues to have trouble with lateral hip pain when walking hills. She is primarily interested in discussing her DEXA scan. We discussed the use of bisphosphonates and Ca and Vit D. On physical examination her symptoms are reproducible with palpation at the level of the greater trochanter.   Assessment: 1) continued symptoms at the greater trochanter that are being reasonable not obviously not completely addressed with a physical therapy program; she would like to revisit her physical therapist because she reports that the number of exercises that she has are . 2) decreased bone density.   Plan: She is going to see her physical therapy for an update on her HEP and she is going to consider the use of bisphosphonate to address her diminished bone density.     Fifteen minutes were spent with the patient with greater than 50% on counseling and coordination of care.

## 2017-09-21 NOTE — TELEPHONE ENCOUNTER
----- Message from Matthieu Villa sent at 9/20/2017  3:00 PM CDT -----  Regarding: Rx question  Contact: [phone]  Pt would like to speak with you about her Rx alendronate (FOSAMAX) 70 MG tablet    She states she wants to discontinue this because it makes her stomach feel upset and she feels terrible. Wants to let Dr. Rincon know.     Message left for pt to call back.  Lindy Campos RN 8:28 AM on 9/21/2017.

## 2017-09-21 NOTE — LETTER
9/21/2017       RE: Marli Ponce  [address]  [facility] [address]     Dear Colleague,    Thank you for referring your patient, Marli Ponce, to the [facility]THOP[facility]IC at [facility]. Please see a copy of my visit note below.    Patient returns today to discuss her hip. She continues to have latera hip pain. She has done some physical therapy. She reports that she somewhat improved and she is able to walk up to 5 miles at a time. She continues to have trouble with lateral hip pain when walking hills. She is primarily interested in discussing her DEXA scan. We discussed the use of bisphosphonates and Ca and Vit D. On physical examination her symptoms are reproducible with palpation at the level of the greater trochanter.   Assessment: 1) continued symptoms at the greater trochanter that are being reasonable not obviously not completely addressed with a physical therapy program; she would like to revisit her physical therapist because she reports that the number of exercises that she has are . 2) decreased bone density.   Plan: She is going to see her physical therapy for an update on her HEP and she is going to consider the use of bisphosphonate to address her diminished bone density.     Fifteen minutes were spent with the patient with greater than 50% on counseling and coordination of care.       Again, thank you for allowing me to participate in the care of your patient.      Sincerely,    Negro Williamson MD

## 2017-09-21 NOTE — MR AVS SNAPSHOT
After Visit Summary   9/21/2017    Marli Ponce    MRN: [id number]           Patient Information     Date Of Birth          1940        Visit Information        Provider Department      9/21/2017 1:15 PM Negro Williamson MD [facility]        Today's Diagnoses     Chronic pain of right hip    -  1       Follow-ups after your visit        Additional Services     PHYSICAL THERAPY REFERRAL (Internal)       Physical Therapy Referral                  Your next 10 appointments already scheduled     Feb 28, 2018 12:00 PM CST   VISUAL FIELD with [facility] EYE VISUAL FIELD   Eye Clinic ([facility])    [address]  [address]   [facility] [address] [place] [address]   [phone]            Feb 28, 2018 12:30 PM CST   RETURN GLAUCOMA with Karma Bustillos MD   Eye Clinic ([facility])    [address]  [address]   [address] [address]  [facility] [address]   [phone]              Who to contact     Please call your clinic at [phone] to:    Ask questions about your health    Make or cancel appointments    Discuss your medicines    Learn about your test results    Speak to your doctor   If you have compliments or concerns about an experience at your clinic, or if you wish to file a complaint, please contact [facility] Patient Relations at [phone] or email us at [email or web address]         Additional Information About Your Visit        MyChart Information     [employer] gives you secure access to your electronic health record. If you see a primary care provider, you can also send messages to your care team and make appointments. If you have questions, please call your primary care clinic.  If you do not have a primary care provider, please call [phone] and they will assist you.      [employer] is an electronic gateway that provides easy, online access to your medical records. With [employer], you can  Outpatient Occupational Therapy Lymphedema Treatment Note  [facility]     Patient Name: Kameron Melendez  : 1972  MRN: [id number]  Today's Date: 2017      Visit Date: 2017    There is no problem list on file for this patient.       No past medical history on file.     No past surgical history on file.      Visit Dx:      ICD-10-CM ICD-9-CM   1. Lymphedema of both lower extremities I89.0 457.1             Lymphedema       17 1100          Subjective Comments    Subjective Comments Reoports problems with the bandages that they applied falling down.  Also, had some itching .  -RE      Subjective Pain    Able to rate subjective pain? yes  -RE      Pre-Treatment Pain Level 1  -RE      Post-Treatment Pain Level 1  -RE      Skin Changes/Observations    Location/Assessment Lower Extremity  -RE      Lower Extremity Conditions right:;inflamed;weeping;left:;intact  -RE      Lower Extremity Color/Pigment bilateral:;erythema   scratches on both both legs  -RE      Manual Lymphatic Drainage    Manual Lymphatic Drainage initial sequence;opened regional lymph nodes;extremity treatment  -RE      Initial Sequence short neck  -RE      Opened Regional Lymph Nodes inguinal  -RE      Inguinal right  -RE      Extremity Treatment MLD to full limb  -RE      Compression/Skin Care    Compression/Skin Care skin care;wrapping location;bandaging  -RE      Skin Care moisturizing lotion applied;topical anti-inflammatory applied;topical anti-microbial applied;other (comment)   zinc oxide  -RE      Wrapping Location lower extremity  -RE      Wrapping Location LE bilateral:;foot to knee  -RE      Wrapping Comments K1, 1-10and 1-15 cm Artiflex, 1-8cm, 2-10cm, 3-12 cm Rosidal K.  -RE      Bandage Layers cotton liner;padding/fluff layer;short-stretch bandages (comment size/quantity)  -RE      Bandaging Technique circumferential/spiral;moderate compression;strong compression  -RE      Compression/Skin Care Comments --   Dressed  request a clinic appointment, read your test results, renew a prescription or communicate with your care team.     To access your existing account, please contact your [facility] or call [phone] for assistance.        Care EveryWhere ID     This is your Care EveryWhere ID. This could be used by other organizations to access your [facility] medical records  [id number]        Your Vitals Were     Height BMI (Body Mass Index)                1.524 m (5') 25.68 kg/m2           Blood Pressure from Last 3 Encounters:   08/01/17 129/75   05/10/17 144/82   09/26/16 158/82    Weight from Last 3 Encounters:   09/21/17 59.6 kg (131 lb 8 oz)   08/01/17 59.2 kg (130 lb 8 oz)   05/10/17 59.2 kg (130 lb 9.6 oz)              We Performed the Following     PHYSICAL THERAPY REFERRAL (Internal)          Today's Medication Changes          These changes are accurate as of: 9/21/17  2:24 PM.  If you have any questions, ask your nurse or doctor.               Stop taking these medicines if you haven't already. Please contact your care team if you have questions.     alendronate 70 MG tablet   Commonly known as:  FOSAMAX   Stopped by:  Ana María Rincon MD                    Primary Care Provider Office Phone # Fax #    Ana María Rincon [phone] [address] [address]        Equal Access to Services     [facility] : Hadii love Elliott, waaxda lujarad, qaybta kaalmaleon dang . So [facility] [phone].    ATENCIÓN: Si habla español, tiene a tate disposición servicios gratuitos de asistencia lingüística. Llame al [phone].    We comply with applicable federal civil rights laws and [place] laws. We do not discriminate on the basis of race, color, national origin, age, disability sex, sexual orientation or gender identity.            Thank you!     Thank you for choosing [facility] ORTHOPAEDIC CLINIC   wound with 2 abd pads and kerlix.  -RE        User Key  (r) = Recorded By, (t) = Taken By, (c) = Cosigned By    Initials Name Provider Type    RE Marjorie Flaherty OTR Occupational Therapist                        OT Assessment/Plan       02/21/17 1155 02/17/17 1427 02/16/17 1552    OT Assessment    Assessment Comments Wound does not appear better. Skin is more red which may be due to scratching and/or too hot of a shower which patient indicated that he took.  Left leg appears smaller.  Will measure next session.   -RE Wound area appears unchanged. Still  has slight drainage. Edema softened very slightly with MLD.  Tolerating bandages well.    -RE Edema is slightly softer indicating a decrease.  Wound appears unchanged but is draining slightly.  Shape and size of patient's leg causes bandages to slide more easily.  Added bandages and increased compression to try to keep bandages inplace.  -RE    OT Plan    OT Plan Comments Continue  -RE Pt and wife to bandage this weekend.  -RE Continue  -RE      02/14/17 2142          OT Assessment    Functional Limitations Impaired gait;Performance in self-care ADL  -RE      Impairments Edema;Impaired lymphatic circulation;Integumentary integrity;Pain;Impaired venous circulation  -RE      Assessment Comments Patient was originally evaluated by PT for lymphedema but due to patient volume he had not been scheduled for treament yet.  Patient was concerned about his wound and was insistent that he be scheduled.  Therefore he was rescheduled with OT since there was an opening for treatment.  Patient presents with severe lymphedema which extends from feet to groin with 3+ pitting.  He has a superficial wound on his right lower leg which has been very slow healing.  He could benefit from Complete Decongestive Therapy to decrease edema, decrease the risk of infection, heal his wound, decrease pain and learn self care strategies.    -RE      OT Diagnosis bilateral LE lymphedema  -RE      OT  "for your care. Our goal is always to provide you with excellent care. Hearing back from our patients is one way we can continue to improve our services. Please take a few minutes to complete the written survey that you may receive in the mail after your visit with us. Thank you!             Your Updated Medication List - Protect others around you: Learn how to safely use, store and throw away your medicines at www.disposemymeds.org.          This list is accurate as of: 9/21/17  2:24 PM.  Always use your most recent med list.                   Brand Name Dispense Instructions for use Diagnosis    ADULT NUTRITIONAL SUPPLEMENT OR      Taking \"Estrovera\"        amLODIPine 5 MG tablet    NORVASC    90 tablet    Take 1 tablet (5 mg) by mouth daily    HTN (hypertension)       BIOTIN FORTE PO      Take by mouth daily        CALCIUM + D PO      Take 2 tablets by mouth daily.        calcium 600-200 MG-UNIT Tabs     180 tablet    Take 1 tablet by mouth 2 times daily        levothyroxine 50 MCG tablet    SYNTHROID/LEVOTHROID    90 tablet    Take 1 tablet (50 mcg) by mouth daily    Hypothyroidism       lisinopril 20 MG tablet    PRINIVIL/ZESTRIL    180 tablet    Take 2 tablets (40 mg) by mouth daily    HTN (hypertension)       multivitamin per tablet      Take 1 tablet by mouth daily.        omeprazole 20 MG CR capsule    priLOSEC    90 capsule    Take 1 capsule (20 mg) by mouth daily    Esophageal reflux       REFRESH OP      Apply to eye as needed          " Rehab Potential Fair   Fair due to Obesity  -RE      Patient/caregiver participated in establishment of treatment plan and goals Yes  -RE      Patient would benefit from skilled therapy intervention Yes  -RE      OT Plan    OT Frequency 3x/week  -RE      Predicted Duration of Therapy Intervention (days/wks) 4-6 weeks  -RE      Planned CPT's? OT EVAL: 90377;OT SELF CARE/MGMT/TRAIN 15 MIN: 80111;OT MANUAL THERAPY EA 15 MIN: 13855  -RE      Planned Therapy Interventions (Optional Details) home exercise program;manual therapy techniques;patient/family education;wound care   compression bandaging  -RE        User Key  (r) = Recorded By, (t) = Taken By, (c) = Cosigned By    Initials Name Provider Type    RE Marjorie Flaherty OTR Occupational Therapist                            OT Goals       02/14/17 1700          OT Short Term Goals    STG Date to Achieve 02/28/17  -RE      STG 1 Patient independent and compliant with self-wrapping techniques of compression bandages with assistance of caregiver as needed for improved self-management of condition.  -RE      STG 1 Progress New  -RE      STG 2 Patient will demonstrate proper awareness of condition and precautions for improved prevention, management, care of symptoms.  -RE      STG 2 Progress New  -RE      STG 3 Patient will demonstrate decreased net edema of bilateral lower extremities >/= 15-30cm  for decrease in edema, symptoms, decreased risk of infection and improved skin care/transition to self-care of condition.   -RE      STG 3 Progress New  -RE      Long Term Goals    LTG Date to Achieve 03/14/17  -RE      LTG 1 Patient will demonstrate decreased net edema of bilateral lower extremities >/= 31-60cm  for decrease in edema, symptoms, decreased risk of infection and improved skin care/transition to self-care of condition.   -RE      LTG 1 Progress New  -RE      LTG 2 Patient independent and compliant with use and care of compression with assistance of a caregiver as  needed to promote self-care independence.   -RE      LTG 2 Progress New  -RE      LTG 3 Patient will demonstrate healing of R LE wound.  -RE      LTG 3 Progress New  -RE      Time Calculation    OT Goal Re-Cert Due Date 03/14/17  -RE        User Key  (r) = Recorded By, (t) = Taken By, (c) = Cosigned By    Initials Name Provider Type    ZULMA Elizabeth Occupational Therapist                Therapy Education       02/21/17 1158    Therapy Education    Given Edema management;Bandaging/dressing change  -RE    Program Reinforced  -RE    How Provided Verbal;Demonstration  -RE    Provided to Patient  -RE    Level of Understanding Teach back education performed;Verbalized   Will plan to record a video of bandaging for pts wife.   -RE      02/17/17 1430    Therapy Education    Given Bandaging/dressing change   Change bandages daily.   -RE    Program Reinforced  -RE    How Provided Verbal  -RE    Provided to Patient  -RE    Level of Understanding Teach back education performed;Verbalized  -RE      02/16/17 1557    Therapy Education    Given Symptoms/condition management;Edema management;Bandaging/dressing change  -RE    Program Reinforced  -RE    How Provided Verbal  -RE    Provided to Patient  -RE    Level of Understanding Teach back education performed;Verbalized  -RE      02/14/17 1741    Therapy Education    Given Edema management;Bandaging/dressing change  -RE    Program New  -RE    How Provided Verbal;Demonstration;Written  -RE    Provided to Patient;Caregiver  -RE    Level of Understanding Teach back education performed;Verbalized  -RE      User Key  (r) = Recorded By, (t) = Taken By, (c) = Cosigned By    Initials Name Provider Type    ZULMA Elizabeth Occupational Therapist                  Time Calculation:   OT Start Time: 0845  OT Stop Time: 0945  OT Time Calculation (min): 60 min       Therapy Charges for Today     Code Description Service Date Service Provider Modifiers Qty    [id number]  OT MANUAL  THERAPY EA 15 MIN 2/21/2017 Marjorie Flaherty, ZULMA GO 4                      ZULMA Jameson  2/21/2017

## 2017-09-25 ENCOUNTER — TELEPHONE (OUTPATIENT)
Dept: INTERNAL MEDICINE | Facility: CLINIC | Age: 77
End: 2017-09-25

## 2017-09-25 NOTE — TELEPHONE ENCOUNTER
----- Message from Matthieu Villa sent at 9/21/2017  3:31 PM CDT -----  Regarding: Requesting a call back  Contact: [phone]  Pt wants to talk with Dr. Rincon only.     Declined to provide further details.   Pt called and no answer.  Lindy Campos RN 8:14 AM on 9/25/2017.

## 2017-09-28 ENCOUNTER — THERAPY VISIT (OUTPATIENT)
Dept: PHYSICAL THERAPY | Facility: CLINIC | Age: 77
End: 2017-09-28
Payer: MEDICARE

## 2017-09-28 DIAGNOSIS — M25.551 HIP PAIN, RIGHT: Primary | ICD-10-CM

## 2017-09-28 PROCEDURE — G8978 MOBILITY CURRENT STATUS: HCPCS | Mod: GP | Performed by: PHYSICAL THERAPIST

## 2017-09-28 PROCEDURE — G8979 MOBILITY GOAL STATUS: HCPCS | Mod: GP | Performed by: PHYSICAL THERAPIST

## 2017-09-28 PROCEDURE — 97112 NEUROMUSCULAR REEDUCATION: CPT | Mod: GP | Performed by: PHYSICAL THERAPIST

## 2017-09-28 PROCEDURE — 97110 THERAPEUTIC EXERCISES: CPT | Mod: GP | Performed by: PHYSICAL THERAPIST

## 2017-09-28 PROCEDURE — 97161 PT EVAL LOW COMPLEX 20 MIN: CPT | Mod: GP | Performed by: PHYSICAL THERAPIST

## 2017-09-28 NOTE — LETTER
DEPARTMENT OF HEALTH AND HUMAN SERVICES  CENTERS FOR MEDICARE & MEDICAID SERVICES    PLAN/UPDATED PLAN OF PROGRESS FOR OUTPATIENT REHABILITATION    PATIENTS NAME:  Marli Ponce   : 1940    PROVIDER NUMBER:    [id number]  [facility]  [id number]     PROVIDER NAME: [facility] PHYSICAL THERAPY  MEDICAL RECORD NUMBER: [id number]     START OF CARE DATE:  SOC Date: 17   TYPE:  PT    PRIMARY/TREATMENT DIAGNOSIS: (Pertinent Medical Diagnosis)  Hip pain, right    VISITS FROM START OF CARE:    1     [facility] Initial Evaluation -- Lower Extremity  Evaluation Date: 2017  Marli Ponce is a 77 year old female with a (R) hip condition.   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    HISTORY:  Present symptoms: (R) posterior/lateral hip and thigh  Pain quality (sharp/shooting/stabbing/aching/burning/cramping):  achy  Present since (onset date): 2017    Symptoms (improving/unchaning/worsening):  worsening.    Symptoms commenced as a result of: Walking up hill   Condition occurred in the following environment: During recreation activity   Symptoms at onset: As above    Paresthesia (yes/no):  No  Spinal history: No     Cough/Sneeze (pos/neg):  Neg  Constant symptoms: None  Intermittent symptoms: As above  Symptoms are worse with the following: Always Rising, Always First few steps, Always Walking, Always Stairs (Up) and Always Sleeping (prone/sup/side R/L) - Right   Symptoms are better with the following: Other - ibuprofen  Continued use makes the pain (better/worse/no effect): Worse  Disturbed night (yes/no): Yes, sometimes            PATIENTS NAME:  Marli Ponce   : 1940  PRIMARY/TREATMENT DIAGNOSIS: (Pertinent  Medical Diagnosis)  Hip pain, right    HISTORY (continued)  Pain at rest (yes/no):  No    Site (back/hip/knee/ankle/foot):  NA  Other questions (swelling/clicking/locking/giving way/falling):  No   Previous episodes: Yes  Previous treatments: PT-hip strengthening - Helped.  Resumed again recently - NE, sometimes more painful and lingering after performing.    Specific Questions:  General health (excellent/good/fair/poor):  Good  Pertinent medical history includes: High blood pressure and Thyroid problems  Medications (nil/NSAIDS/analg/steroids/anticoag/other):  NSAIDS and Other - Thyroid and High blood pressure  Medical allergies:  None  Imaging (none/Xray/MRI/other):  X-ray  Recent or major surgery (yes/no):  No  Night pain (yes/no):  No  Accidents (yes/no):  No  Unexplained weight loss (yes/no):  No  Barriers at home: No  Other red flags: No    Sites for physical examination (back/hip/knee/ankle/foot/other): Back/Hip    EXAMINATION    Posture:  Sitting (good/fair/poor): Fair    Correction of Posture (better/worse/no effect/NA): No effect  Standing (good/fair/poor): Fair  Other observations:  NT    Neurological: (NA/motor/sensory/reflexes/dural): NT    Baselines (pain or functional activity): Painful walking, ascending stairs    Extremities (Hip / Knee / Ankle / Foot): Hip    Movement Loss Michael Mod Min Nil Pain   Flexion   X  ERP   Extension   X     Abduction        Adduction        Internal Rotation   X  ERP   External Rotation   X  ERP   PATIENTS NAME:  Marli Ponce   : 1940  PRIMARY/TREATMENT DIAGNOSIS: (Pertinent Medical Diagnosis)  Hip pain, right    Passive Movement (+/- over pressure)/(PDM/ERP):  NT  Resisted Test Response (pain): (R) Hip Flex 4/5, Painful; Abd 4/5, painful; Quad 5/5; H-S 5/5  Other Tests: NT    Spine:  Movement loss: Flex Min loss/ERP; Ext Mod loss; SG (R) Mod loss (L) Min loss  Effect of repeated movements: EIL - Produce (R) low back/glut, improves with reps, NW, Increased (R) hip  ROM all planes with less pain.  Improved hip flex and abd strength.  Effect of static positioning: NT  Spine testing (not relevant/relevant/secondary problem): Relevant    Baseline Symptoms: NA  Repeated Tests Symptom Response Mechanical Response   Active/Passive movement, resisted test, functional test During -  Produce, Abolish, Increase, Decrease, NE After -  Better, Worse, NB, NW, NE Effect -   ? or ? ROM, strength or key functional test No   Effect   NT due to response with repeated L/S movements       Effect of static positioning       NT         Provisional Classification (Extremity/Spine):  Spine - Derangement - Asymmetrical, unilateral, symptoms above knee    Princicple of Management:   Education:  Posture, Spine vs hip source of pain, specificity of exercise    Equipment provided:  Lumbar roll  Exercise and dosage:  Repeated L/S EIL x 10-15 reps, 5-6 x daily    ASSESSMENT/PLAN:  Patient is a 77 year old female with right side hip complaints.    Patient has the following significant findings with corresponding treatment plan.                Diagnosis 1:  (R) Hip Pain with lumbar component    Pain -  self management, education, directional preference exercise and home program  Decreased ROM/flexibility - manual therapy, therapeutic exercise and home program  Decreased joint mobility - manual therapy, therapeutic exercise and home program  Decreased strength - therapeutic exercise, therapeutic activities and home program  Impaired gait - gait training and home program  Impaired muscle performance - neuro re-education and home program  Decreased function - therapeutic activities and home program  Impaired posture - neuro re-education and home program  PATIENTS NAME:  Marli Ponce   : 1940  PRIMARY/TREATMENT DIAGNOSIS: (Pertinent Medical Diagnosis)  Hip pain, right    Therapy Evaluation Codes:   1) History comprised of:   Personal factors that impact the plan of care:      Language.    Comorbidity  factors that impact the plan of care are:      High blood pressure.     Medications impacting care: Anti-inflammatory and High blood pressure.  2) Examination of Body Systems comprised of:   Body structures and functions that impact the plan of care:      Hip and Lumbar spine.   Activity limitations that impact the plan of care are:      Squatting/kneeling, Stairs, Walking, Laying down and rising.  3) Clinical presentation characteristics are:   Stable/Uncomplicated.  4) Decision-Making    Low complexity using standardized patient assessment instrument and/or measureable assessment of functional outcome.  Cumulative Therapy Evaluation is: Low complexity.    Previous and current functional limitations:  (See Goal Flow Sheet for this information)    Short term and Long term goals: (See Goal Flow Sheet for this information)   Communication ability:  Patient appears to be able to clearly communicate and understand verbal and written communication and follow directions correctly.  Treatment Explanation - The following has been discussed with the patient:   RX ordered/plan of care, Anticipated outcomes, Possible risks and side effects                                                PATIENTS NAME:  Marli Ponce   : 1940  PRIMARY/TREATMENT DIAGNOSIS: (Pertinent Medical Diagnosis)  Hip pain, right    This patient would benefit from PT intervention to resume normal activities.   Rehab potential is good.  Frequency:  1 X week, once daily  Duration:  for 6 weeks  Discharge Plan:  Achieve all LTG.  Independent in home treatment program.  Reach maximal therapeutic benefit.             Caregiver Signature/Credentials _____________________________ Date ________          Hema Singh DPT, Cert MDT     I have reviewed and certified the need for these services and plan of treatment while under my care.        PHYSICIAN'S SIGNATURE:   _________________________________________    Date___________   Negro Williamson,  "MD    Certification period:  Beginning of Cert date period: 09/28/17 to  12/26/17     Functional Level Progress Report: Please see attached \"Goal Flow sheet for Functional level.\"    ____X____ Continue Services or       ________ DC Services                Service dates: From  SOC Date: 09/28/17  to present                         "

## 2017-10-03 ENCOUNTER — DOCUMENTATION ONLY (OUTPATIENT)
Dept: OTHER | Facility: CLINIC | Age: 77
End: 2017-10-03

## 2017-10-03 PROBLEM — M25.551 HIP PAIN, RIGHT: Status: ACTIVE | Noted: 2017-10-03

## 2017-10-11 ENCOUNTER — THERAPY VISIT (OUTPATIENT)
Dept: PHYSICAL THERAPY | Facility: CLINIC | Age: 77
End: 2017-10-11
Payer: MEDICARE

## 2017-10-11 DIAGNOSIS — M25.551 HIP PAIN, RIGHT: ICD-10-CM

## 2017-10-11 PROCEDURE — 97110 THERAPEUTIC EXERCISES: CPT | Mod: GP | Performed by: PHYSICAL THERAPIST

## 2017-10-25 ENCOUNTER — THERAPY VISIT (OUTPATIENT)
Dept: PHYSICAL THERAPY | Facility: CLINIC | Age: 77
End: 2017-10-25
Payer: MEDICARE

## 2017-10-25 DIAGNOSIS — M25.551 HIP PAIN, RIGHT: ICD-10-CM

## 2017-10-25 PROCEDURE — G8979 MOBILITY GOAL STATUS: HCPCS | Mod: GP | Performed by: PHYSICAL THERAPIST

## 2017-10-25 PROCEDURE — G8980 MOBILITY D/C STATUS: HCPCS | Mod: GP | Performed by: PHYSICAL THERAPIST

## 2017-10-25 PROCEDURE — 97110 THERAPEUTIC EXERCISES: CPT | Mod: GP | Performed by: PHYSICAL THERAPIST

## 2017-10-25 NOTE — PROGRESS NOTES
Subjective:    HPI                    Objective:    System    Physical Exam    General     ROS    Assessment/Plan:      DISCHARGE REPORT    Progress reporting period is from 9.28.17 to 10.25.17.       SUBJECTIVE  Subjective changes noted by patient:  Pt reports hip pain has been much better.  Has not been noticing any real pain outside of some (L) low back pain when doing press ups but does not linger afterwards.  Still has been consistent with HEP 5-6 x daily and doing general exercise routine at home without c/o.    Current Pain level: 0/10.     Initial Pain level: 5/10.   Changes in function:  Yes (See Goal flowsheet attached for changes in current functional level)  Adverse reaction to treatment or activity: None    OBJECTIVE  L/S AROM:  Flex WNL; Ext min loss/ERP central low back; SG (L) WNL (R) WNL/ERP (L) low back.  Pain abolishes after repeated EIL.  Hip PROM:  Flex WNL; ER WNL/ERP; IR WNL/ERP.  ERP abolishes after EIL.  Strength:  Hip Abd (R) 5-/5 (L) 5/5.  Strength improved after EIL.     ASSESSMENT/PLAN  Updated problem list and treatment plan: Diagnosis 1:  (R) Hip Pain with lumbar component    Decreased ROM/flexibility - home program  Decreased joint mobility - home program  STG/LTGs have been met or progress has been made towards goals:  Yes (See Goal flow sheet completed today.)  Assessment of Progress: The patient's condition is improving.  The patient has met all of their long term goals.  Self Management Plans:  Patient is independent in a home treatment program.  Patient is independent in self management of symptoms.  I have re-evaluated this patient and find that the nature, scope, duration and intensity of the therapy is appropriate for the medical condition of the patient.  Marli continues to require the following intervention to meet STG and LTG's:  PT intervention is no longer required to meet STG/LTG.    Recommendations:  This patient is ready to be discharged from therapy and continue  their home treatment program.    Please refer to the daily flowsheet for treatment today, total treatment time and time spent performing 1:1 timed codes.

## 2017-11-07 ENCOUNTER — TRANSFERRED RECORDS (OUTPATIENT)
Dept: HEALTH INFORMATION MANAGEMENT | Facility: CLINIC | Age: 77
End: 2017-11-07

## 2017-11-16 ENCOUNTER — TELEPHONE (OUTPATIENT)
Dept: INTERNAL MEDICINE | Facility: CLINIC | Age: 77
End: 2017-11-16

## 2017-11-16 NOTE — TELEPHONE ENCOUNTER
----- Message -----        From: Marli Ponce        Sent: 11/14/2017   9:48 PM          To: [facility] Call Center Scheduling Pool     Subject: Appointment Request                                    Appointment Request From: Marli Ponce          With Provider: Ana María Rincon MD [-Primary Care Physician-]          Preferred Date Range: Any date 11/14/2017 or later          Preferred Times: Any          Reason for visit: Request an Appointment          Comments:     Dear Dr. Rincon: My  sent to you a message on Nov 8 regarding my visit to Dr. Hudson Sharp to treat a flare up of my TMJ. He suggested to have sedrate test to rule out temporal artery arteritis. This has not been done because my doctor needs to order it. Nevertheless my symptoms have disappeared with the treatment recommended of topical ice, ibuprofen,physical therapy and temporary modify my diet. I am OK now. Do I have to do it yet? I would appreciate your advice.         Thank you.                                                         Marli Ponce

## 2017-11-28 ENCOUNTER — TRANSFERRED RECORDS (OUTPATIENT)
Dept: HEALTH INFORMATION MANAGEMENT | Facility: CLINIC | Age: 77
End: 2017-11-28

## 2018-02-26 DIAGNOSIS — K21.9 GASTROESOPHAGEAL REFLUX DISEASE WITHOUT ESOPHAGITIS: ICD-10-CM

## 2018-04-29 DIAGNOSIS — E03.9 HYPOTHYROIDISM: ICD-10-CM

## 2018-04-29 DIAGNOSIS — I10 HTN (HYPERTENSION): ICD-10-CM

## 2018-05-01 RX ORDER — LEVOTHYROXINE SODIUM 50 UG/1
50 TABLET ORAL DAILY
Qty: 30 TABLET | Refills: 2 | Status: SHIPPED | OUTPATIENT
Start: 2018-05-01 | End: 2018-07-15

## 2018-05-01 RX ORDER — LISINOPRIL 20 MG/1
20 TABLET ORAL DAILY
Qty: 180 TABLET | Refills: 0 | Status: SHIPPED | OUTPATIENT
Start: 2018-05-01 | End: 2018-10-03

## 2018-05-01 NOTE — TELEPHONE ENCOUNTER
Last Clinic Visit: 5/10/2017  [facility]  *pending appt 5/14/18

## 2018-05-14 ENCOUNTER — OFFICE VISIT (OUTPATIENT)
Dept: INTERNAL MEDICINE | Facility: CLINIC | Age: 78
End: 2018-05-14
Payer: MEDICARE

## 2018-05-14 VITALS
SYSTOLIC BLOOD PRESSURE: 145 MMHG | HEART RATE: 61 BPM | DIASTOLIC BLOOD PRESSURE: 77 MMHG | BODY MASS INDEX: 25.39 KG/M2 | WEIGHT: 130 LBS

## 2018-05-14 DIAGNOSIS — E03.8 OTHER SPECIFIED HYPOTHYROIDISM: ICD-10-CM

## 2018-05-14 DIAGNOSIS — I10 ESSENTIAL HYPERTENSION, BENIGN: ICD-10-CM

## 2018-05-14 DIAGNOSIS — E03.8 OTHER SPECIFIED HYPOTHYROIDISM: Primary | ICD-10-CM

## 2018-05-14 LAB
ANION GAP SERPL CALCULATED.3IONS-SCNC: 6 MMOL/L (ref 3–14)
BUN SERPL-MCNC: 14 MG/DL (ref 7–30)
CALCIUM SERPL-MCNC: 9.2 MG/DL (ref 8.5–10.1)
CHLORIDE SERPL-SCNC: 106 MMOL/L (ref 94–109)
CO2 SERPL-SCNC: 27 MMOL/L (ref 20–32)
CREAT SERPL-MCNC: 0.9 MG/DL (ref 0.52–1.04)
GFR SERPL CREATININE-BSD FRML MDRD: 60 ML/MIN/1.7M2
GLUCOSE SERPL-MCNC: 90 MG/DL (ref 70–99)
POTASSIUM SERPL-SCNC: 4.6 MMOL/L (ref 3.4–5.3)
SODIUM SERPL-SCNC: 139 MMOL/L (ref 133–144)
TSH SERPL DL<=0.005 MIU/L-ACNC: 0.55 MU/L (ref 0.4–4)

## 2018-05-14 ASSESSMENT — PAIN SCALES - GENERAL: PAINLEVEL: NO PAIN (0)

## 2018-05-14 ASSESSMENT — ACTIVITIES OF DAILY LIVING (ADL)
IN_THE_PAST_7_DAYS,_DID_YOU_NEED_HELP_FROM_OTHERS_TO_PERFORM_EVERYDAY_ACTIVITIES_SUCH_AS_EATING,_GETTING_DRESSED,_GROOMING,_BATHING,_WALKING,_OR_USING_THE_TOILET: N
IN_THE_PAST_7_DAYS,_DID_YOU_NEED_HELP_FROM_OTHERS_TO_TAKE_CARE_OF_THINGS_SUCH_AS_LAUNDRY_AND_HOUSEKEEPING,_BANKING,_SHOPPING,_USING_THE_TELEPHONE,_FOOD_PREPARATION,_TRANSPORTATION,_OR_TAKING_YOUR_OWN_MEDICATIONS?: N

## 2018-05-14 NOTE — PATIENT INSTRUCTIONS
[facility]: [phone]     [facility] Medication Refill Request Information:  * Please contact your pharmacy regarding ANY request for medication refills.  ** [facility] Prescription Fax = [phone]  * Please allow 3 business days for routine medication refills.  * Please allow 5 business days for controlled substance medication refills.     [facility] Test Result notification information:  *You will be notified with in 7-10 days of your appointment day regarding the results of your test.  If you are on MyChart you will be notified as soon as the provider has reviewed the results and signed off on them.

## 2018-05-14 NOTE — PROGRESS NOTES
[facility]  Primary Care Center   Ana María Rincon MD  05/14/2018      Chief Complaint:   Physical    History of Present Illness:   Marli Ponce is a 77 year old female with a history of hyperlipidemia, hypertension, hypothyroidism, and osteoarthritis who presents to clinic for routine physical. I last saw the patient on 5/10/17, at which time we discussed her anterior thigh pain after exercise, and health maintenance. Today the patient has no health concerns. She is exercising regularly and eating healthily with many fruits and vegetables and vitamins. Her blood pressure at home has been good, normally 120s systolic, and she has a past of being elevated in clinic as her BP today is 145/77. She is taking Lisinopril for her blood pressure.     Denies any ear problems, shortness of breath, wheezing, back pain, chest pain, dizziness, palpitations, speech difficulty      Review of Systems:   Answers for HPI/ROS submitted by the patient on 5/14/2018   General Symptoms: No  Skin Symptoms: No  HENT Symptoms: No  EYE SYMPTOMS: No  HEART SYMPTOMS: No  LUNG SYMPTOMS: No  INTESTINAL SYMPTOMS: No  URINARY SYMPTOMS: No  GYNECOLOGIC SYMPTOMS: No  BREAST SYMPTOMS: No  SKELETAL SYMPTOMS: No  BLOOD SYMPTOMS: No  NERVOUS SYSTEM SYMPTOMS: No  MENTAL HEALTH SYMPTOMS: No  All systems were reviewed and negative, other than as noted above or in the HPI.      Active Medications:     Current Outpatient Prescriptions:      BIOTIN FORTE PO, Take by mouth daily, Disp: , Rfl:      calcium 600-200 MG-UNIT TABS, Take 1 tablet by mouth 2 times daily, Disp: 180 tablet, Rfl: 3     Calcium Carbonate-Vitamin D (CALCIUM + D PO), Take 2 tablets by mouth daily., Disp: , Rfl:      levothyroxine (SYNTHROID/LEVOTHROID) 50 MCG tablet, Take 1 tablet (50 mcg) by mouth daily, Disp: 30 tablet, Rfl: 2     lisinopril (PRINIVIL/ZESTRIL) 20 MG tablet, Take 1 tablet (20 mg) by mouth daily, Disp: 180 tablet, Rfl: 0     Multiple Vitamin (MULTIVITAMIN) per tablet,  "Take 1 tablet by mouth daily., Disp: , Rfl:      Nutritional Supplements (ADULT NUTRITIONAL SUPPLEMENT OR), Taking \"Estrovera\", Disp: , Rfl:      omeprazole (PRILOSEC) 20 MG CR capsule, Take 1 capsule (20 mg) by mouth daily, Disp: 90 capsule, Rfl: 1     Polyvinyl Alcohol-Povidone (REFRESH OP), Apply to eye as needed, Disp: , Rfl:       Allergies:   Cosopt [dorzolamide-timolol]; Lumigan; and Xalatan [latanoprost]      Past Medical History:  Cataract  Hyperlipidemia   Hypertension   Low bone density   Hypothyroidism   Disorder of bone and cartilage  Hip pain   Chronic gastritis   Transient amnesia   Primary osteoarthritis right hip   ACP      Past Surgical History:  Cholecystectomy   Laparoscopic appendectomy   Laser iridotomy right eye and left  Trabeculectomy, mitomycin filter left    Family History:   Mother - stomach cancer, hypertension, glaucoma   Sister - breast cancer  Father - hypertension, cerebrovascular disease  Maternal grandfather  - glaucoma       Social History:   Presents alone   Tobacco use: Never smoker   Alcohol consumption: Rare tequila   Marital status:     Selling her house currently      Physical Exam:   /77  Pulse 61  Wt 59 kg (130 lb)  Breastfeeding? No  BMI 25.39 kg/m2   Constitutional: Healthy, alert, no distress and cooperative  Head: Normocephalic. No masses, lesions, tenderness or abnormalities  Eyes: PERRL, no conjunctival injection  ENT: Bilateral TM normal without fluid or infection, throat normal without erythema or exudate, no cervical lymphadenopathy. No lesions.   Neck: Thyroid normal   Cardiovascular: JVP 7 cm. RRR, S1,S2 no murmurs, clicks, rubs, or gallops. No pretibial edema.  No carotid bruits. Carotid arteries normal bilaterally. DP pulses normal. Posterior tibialis pulses normal.  No dupuytren's contractures or edema noted. Femoral pulses normal. No ankle edema.   Respiratory: Clear to auscultation and percussion bilaterally.   Gastrointestinal: BS NA. " Soft, nontender, no hepatosplenomegaly or mass. Liver is 7 centimeters at the midclavicular line.    Extremities: Joints are normal. No gross deformities noted, gait normal and normal muscle tone.    Skin: No suspicious lesions or rashes   Psychiatric: Mentation appears normal and affect normal      Assessment and Plan:  Patient presents today for routine physical     Other specified hypothyroidism  Will perform thyroid evaluation today due to her history of hypothyroidism.    - TSH with free T4 reflex    Essential hypertension, benign  We will obtain a BMP to evaluate her kidney function in relation to her hypertension   - Basic metabolic panel     Routine Health Maintenance:   Mammogram (females age 40 - 75): yearly or every 2 years? - Patient would like to defer.   Colon Cancer screening (age 50 - 75): yearly FIT or colonoscopy every 5 - 10 years - Up to date, will no longer receive these unless indicated.   Dexa (females age ? 65, males age ? 70): every 2 years - Last obtained on 8/2017  Glucose: every 5 years, yearly if risk factors? - Recommended  Lipid panel: every 5 years, yearly if risk factors - Up to Date   Immunizations (Tetanus every 10 years, Influenza yearly, Pneumonia PPV-23 and PCV-13 age ? 65 or sooner if risk factors) - I recommended the new shingles vaccine but patient will defer at the time.        Follow-up: One year or PRN      Scribe Disclosure:   I, Jorje Gonzalez, am serving as a scribe to document services personally performed by Ana María Rincon MD at this visit, based upon the provider's statements to me. All documentation has been reviewed by the aforementioned provider prior to being entered into the official medical record.     Portions of this medical record were completed by a scribe. UPON MY REVIEW AND AUTHENTICATION BY ELECTRONIC SIGNATURE, this confirms (a) I performed the applicable clinical services, and (b) the record is accurate.      Ana María Rincon

## 2018-05-14 NOTE — NURSING NOTE
Chief Complaint   Patient presents with     Physical     Patient here for physical.      Sharmin Bennett CMA at 10:17 AM on 5/14/2018.

## 2018-05-14 NOTE — MR AVS SNAPSHOT
After Visit Summary   5/14/2018    Marli Ponce    MRN: [id number]           Patient Information     Date Of Birth          1940        Visit Information        Provider Department      5/14/2018 10:10 AM Ana María Rincon MD [facility]        Today's Diagnoses     Other specified hypothyroidism    -  1    Essential hypertension, benign          Care Instructions    Primary Care Center: [phone]     Primary Care Center Medication Refill Request Information:  * Please contact your pharmacy regarding ANY request for medication refills.  ** [facility] Prescription Fax = [phone]  * Please allow 3 business days for routine medication refills.  * Please allow 5 business days for controlled substance medication refills.     Primary Care Center Test Result notification information:  *You will be notified with in 7-10 days of your appointment day regarding the results of your test.  If you are on MyChart you will be notified as soon as the provider has reviewed the results and signed off on them.            Follow-ups after your visit        Your next 10 appointments already scheduled     Jun 04, 2018  1:15 PM CDT   VISUAL FIELD with [facility] EYE VISUAL FIELD   Eye Clinic ([facility])    [address] [place] [address]   [phone]            Jun 04, 2018  1:45 PM CDT   RETURN GLAUCOMA with Karma Bustillos MD   Eye Clinic ([facility])    [address] [address]   [phone]            Aug 06, 2018 11:00 AM CDT   Annual Visit with Cami Hearn MD   [facility] ([facility])    [facility] [address]  [address]  [facility] [address]   [phone]              Future tests that were ordered for you today     Open Future Orders        Priority Expected Expires Ordered     TSH with free T4 reflex Routine 5/14/2018 5/28/2018 5/14/2018    Basic metabolic panel Routine 5/14/2018 5/28/2018 5/14/2018            Who to contact     Please call your clinic at [phone] to:    Ask questions about your health    Make or cancel appointments    Discuss your medicines    Learn about your test results    Speak to your doctor            Additional Information About Your Visit        [employer] Information     [employer] gives you secure access to your electronic health record. If you see a primary care provider, you can also send messages to your care team and make appointments. If you have questions, please call your primary care clinic.  If you do not have a primary care provider, please call [phone] and they will assist you.      [employer] is an electronic gateway that provides easy, online access to your medical records. With [employer], you can request a clinic appointment, read your test results, renew a prescription or communicate with your care team.     To access your existing account, please contact your [facility] or call [phone] for assistance.        Care EveryWhere ID     This is your Care EveryWhere ID. This could be used by other organizations to access your [place] medical records  [id number]        Your Vitals Were     Pulse Breastfeeding? BMI (Body Mass Index)             61 No 25.39 kg/m2          Blood Pressure from Last 3 Encounters:   05/14/18 145/77   08/01/17 129/75   05/10/17 144/82    Weight from Last 3 Encounters:   05/14/18 59 kg (130 lb)   09/21/17 59.6 kg (131 lb 8 oz)   08/01/17 59.2 kg (130 lb 8 oz)                 Today's Medication Changes          These changes are accurate as of 5/14/18 10:59 AM.  If you have any questions, ask your nurse or doctor.               Stop taking these medicines if you haven't already. Please contact your care team if you have questions.     amLODIPine 5 MG tablet   Commonly known as:  NORVASC  "  Stopped by:  Ana María Rincon MD                    Primary Care Provider Office Phone # Fax #    Ana María Rincon [phone]       [address] [address]        Equal Access to Services     KEITH KELLEY : Hadii love ibarra geronimo Soomaali, waaxda luqadaha, qaybta kaalmada adeegyada, leon berryn rossy adams layaronevangelina home. So [facility] [phone].    ATENCIÓN: Si habla español, tiene a tate disposición servicios gratuitos de asistencia lingüística. Llame al [phone].    We comply with applicable federal civil rights laws and [place] laws. We do not discriminate on the basis of race, color, national origin, age, disability, sex, sexual orientation, or gender identity.            Thank you!     Thank you for choosing [facility]  for your care. Our goal is always to provide you with excellent care. Hearing back from our patients is one way we can continue to improve our services. Please take a few minutes to complete the written survey that you may receive in the mail after your visit with us. Thank you!             Your Updated Medication List - Protect others around you: Learn how to safely use, store and throw away your medicines at www.disposemymeds.org.          This list is accurate as of 5/14/18 10:59 AM.  Always use your most recent med list.                   Brand Name Dispense Instructions for use Diagnosis    ADULT NUTRITIONAL SUPPLEMENT OR      Taking \"Estrovera\"        BIOTIN FORTE PO      Take by mouth daily        CALCIUM + D PO      Take 2 tablets by mouth daily.        calcium 600-200 MG-UNIT Tabs     180 tablet    Take 1 tablet by mouth 2 times daily        levothyroxine 50 MCG tablet    SYNTHROID/LEVOTHROID    30 tablet    Take 1 tablet (50 mcg) by mouth daily    Hypothyroidism       lisinopril 20 MG tablet    PRINIVIL/ZESTRIL    180 tablet    Take 1 tablet (20 mg) by mouth daily    HTN (hypertension)       multivitamin per tablet      " Take 1 tablet by mouth daily.        omeprazole 20 MG CR capsule    priLOSEC    90 capsule    Take 1 capsule (20 mg) by mouth daily    Gastroesophageal reflux disease without esophagitis       REFRESH OP      Apply to eye as needed

## 2018-06-04 ENCOUNTER — OFFICE VISIT (OUTPATIENT)
Dept: OPHTHALMOLOGY | Facility: CLINIC | Age: 78
End: 2018-06-04
Attending: OPHTHALMOLOGY
Payer: MEDICARE

## 2018-06-04 DIAGNOSIS — H40.1132 PRIMARY OPEN ANGLE GLAUCOMA OF BOTH EYES, MODERATE STAGE: Primary | ICD-10-CM

## 2018-06-04 PROCEDURE — G0463 HOSPITAL OUTPT CLINIC VISIT: HCPCS | Mod: ZF

## 2018-06-04 PROCEDURE — 92083 EXTENDED VISUAL FIELD XM: CPT | Mod: ZF | Performed by: OPHTHALMOLOGY

## 2018-06-04 ASSESSMENT — SLIT LAMP EXAM - LIDS
COMMENTS: NORMAL
COMMENTS: NORMAL

## 2018-06-04 ASSESSMENT — TONOMETRY
IOP_METHOD: APPLANATION
OD_IOP_MMHG: 08
OS_IOP_MMHG: 12

## 2018-06-04 ASSESSMENT — REFRACTION_WEARINGRX
OD_CYLINDER: +1.75
OS_ADD: +2.75
OS_SPHERE: +0.75
OS_CYLINDER: +1.50
OD_SPHERE: -0.75
OS_AXIS: 010
OD_ADD: +2.75
OD_AXIS: 170

## 2018-06-04 ASSESSMENT — VISUAL ACUITY
OS_CC: 20/30
METHOD: SNELLEN - LINEAR
CORRECTION_TYPE: GLASSES
OS_CC+: +3
OD_CC: 20/20

## 2018-06-04 ASSESSMENT — CONF VISUAL FIELD
OS_NORMAL: 1
OD_NORMAL: 1

## 2018-06-04 ASSESSMENT — CUP TO DISC RATIO
OD_RATIO: 0.7
OS_RATIO: 0.8

## 2018-06-04 NOTE — PROGRESS NOTES
Primary open angle glaucoma (POAG) moderate  Maximum intraocular pressure 24 per patient    Vision stable except getting harder to read things up close in the left eye. Eyes comfortable. No drops    Ocular surgeries:   Trabeculectomy mitomycin-C and Cataract extraction with intraocular lens right eye (6/2/2015)   Trabeculectomy mitomycin-C left eye 1-10-12 (1 minute mitomycin-C and 3 flap sutures)    Ocular Rx:  No gtts    Octopus visual field 6/4/18 24-2   Stable both eyes except worse MD left eye (cataract)    OCT retinal nerve fiber layer 8/30/17   OD significant thinning compared to 3/2014 (pt had phaco/trab OD in 6/2015)   OS stable compared to 3/2014    Assessment :    1) Primary open angle glaucoma (POAG) adv left eye   Trabeculectomy mitomycin-C left eye 1-10-12 (1 minute mitomycin-C and 3 flap sutures)  Trabeculectomy mitomycin-C and Cataract extraction with intraocular lens right eye (6/3/2015)    2) Cataract, left eye-mild  Likely visually significant  Pt prefers to monitor for now     Plan  RV 6 months dilate and OCT retinal nerve fiber layer     Attending Physician Attestation:  Complete documentation of historical and exam elements from today's encounter can be found in the full encounter summary report (not reduplicated in this progress note). I personally obtained the chief complaint(s) and history of present illness. I confirmed and edited asnecessary the review of systems, past medical/surgical history, family history, social history, and examination findings as documented by others; and I examined the patient myself. I personally reviewed the relevant tests, images, and reports as documented above. I formulated and edited as necessary the assessment and plan and discussed the findings and management plan with the patient and family.  - Karma Bustillos MD 2:56 PM 6/4/2018

## 2018-06-04 NOTE — NURSING NOTE
Chief Complaints and History of Present Illnesses   Patient presents with     Follow Up For     10 month follow up POAG      HPI    Affected eye(s):  Both   Symptoms:     No floaters   No flashes   No glare   No halos      Frequency:  Constant       Do you have eye pain now?:  No      Comments:  10 month follow up POAG  Doing good no changes  Halie Jacobs COA 1:50 PM June 4, 2018

## 2018-06-04 NOTE — MR AVS SNAPSHOT
After Visit Summary   6/4/2018    Marli Ponce    MRN: [id number]           Patient Information     Date Of Birth          1940        Visit Information        Provider Department      6/4/2018 1:45 PM Karma Bustillos MD [facility]        Today's Diagnoses     Primary open angle glaucoma of both eyes, moderate stage    -  1       Follow-ups after your visit        Follow-up notes from your care team     Return in about 6 months (around 12/4/2018) for OCT rnfl, dilation.      Your next 10 appointments already scheduled     Aug 06, 2018 11:00 AM CDT   Annual Visit with Cami Hearn MD   [facility] ([facility])    [facility] [address]  [address]  [facility] [id number] [phone]            Dec 17, 2018 12:15 PM CST   RETURN GLAUCOMA with Karma Bustillos MD   Eye Clinic ([facility])    [address]  [address]  [facility] [address] [phone]              Who to contact     Please call your clinic at [phone] to:    Ask questions about your health    Make or cancel appointments    Discuss your medicines    Learn about your test results    Speak to your doctor            Additional Information About Your Visit        [employer] Information     [employer] gives you secure access to your electronic health record. If you see a primary care provider, you can also send messages to your care team and make appointments. If you have questions, please call your primary care clinic.  If you do not have a primary care provider, please call [phone] and they will assist you.      [employer] is an electronic gateway that provides easy, online access to your medical records. With [employer], you can request a clinic appointment, read your test results, renew a prescription or communicate with your care team.     To access your existing account, please contact your [facility]  "[place] [facility] or call [phone] for assistance.        Care EveryWhere ID     This is your Care EveryWhere ID. This could be used by other organizations to access your [place] medical records  [id number]         Blood Pressure from Last 3 Encounters:   05/14/18 145/77   08/01/17 129/75   05/10/17 144/82    Weight from Last 3 Encounters:   05/14/18 59 kg (130 lb)   09/21/17 59.6 kg (131 lb 8 oz)   08/01/17 59.2 kg (130 lb 8 oz)              We Performed the Following     OVF 24-2 Dynamic OU        Primary Care Provider Office Phone # Fax #    Ana María Rincon [phone]       [address] [address]        Equal Access to Services     KEITH [facility] : Hadii love ibarra hadasho Soomaali, waaxda luqadaha, qaybta kaalmada adeegyada, leon matthews . So [facility] [phone].    ATENCIÓN: Si habla español, tiene a tate disposición servicios gratuitos de asistencia lingüística. Mel al [phone].    We comply with applicable federal civil rights laws and [place] laws. We do not discriminate on the basis of race, color, national origin, age, disability, sex, sexual orientation, or gender identity.            Thank you!     Thank you for choosing EYE CLINIC  for your care. Our goal is always to provide you with excellent care. Hearing back from our patients is one way we can continue to improve our services. Please take a few minutes to complete the written survey that you may receive in the mail after your visit with us. Thank you!             Your Updated Medication List - Protect others around you: Learn how to safely use, store and throw away your medicines at www.disposemymeds.org.          This list is accurate as of 6/4/18  2:57 PM.  Always use your most recent med list.                   Brand Name Dispense Instructions for use Diagnosis    ADULT NUTRITIONAL SUPPLEMENT OR      Taking \"Estrovera\"        BIOTIN FORTE PO      Take by mouth " daily        CALCIUM + D PO      Take 2 tablets by mouth daily.        calcium 600-200 MG-UNIT Tabs     180 tablet    Take 1 tablet by mouth 2 times daily        levothyroxine 50 MCG tablet    SYNTHROID/LEVOTHROID    30 tablet    Take 1 tablet (50 mcg) by mouth daily    Hypothyroidism       lisinopril 20 MG tablet    PRINIVIL/ZESTRIL    180 tablet    Take 1 tablet (20 mg) by mouth daily    HTN (hypertension)       multivitamin per tablet      Take 1 tablet by mouth daily.        omeprazole 20 MG CR capsule    priLOSEC    90 capsule    Take 1 capsule (20 mg) by mouth daily    Gastroesophageal reflux disease without esophagitis       REFRESH OP      Apply to eye as needed

## 2018-07-15 DIAGNOSIS — E03.9 HYPOTHYROIDISM: ICD-10-CM

## 2018-07-17 RX ORDER — LEVOTHYROXINE SODIUM 50 UG/1
50 TABLET ORAL DAILY
Qty: 90 TABLET | Refills: 2 | Status: SHIPPED | OUTPATIENT
Start: 2018-07-17 | End: 2018-11-09

## 2018-08-09 ENCOUNTER — OFFICE VISIT (OUTPATIENT)
Dept: OBGYN | Facility: CLINIC | Age: 78
End: 2018-08-09
Attending: OBSTETRICS & GYNECOLOGY
Payer: MEDICARE

## 2018-08-09 VITALS
DIASTOLIC BLOOD PRESSURE: 70 MMHG | HEIGHT: 60 IN | BODY MASS INDEX: 25.31 KG/M2 | WEIGHT: 128.9 LBS | SYSTOLIC BLOOD PRESSURE: 144 MMHG | HEART RATE: 64 BPM

## 2018-08-09 DIAGNOSIS — Z12.39 BREAST CANCER SCREENING: ICD-10-CM

## 2018-08-09 DIAGNOSIS — Z01.419 ENCOUNTER FOR GYNECOLOGICAL EXAMINATION WITHOUT ABNORMAL FINDING: Primary | ICD-10-CM

## 2018-08-09 PROCEDURE — G0463 HOSPITAL OUTPT CLINIC VISIT: HCPCS | Mod: ZF

## 2018-08-09 NOTE — MR AVS SNAPSHOT
After Visit Summary   8/9/2018    Marli Ponce    MRN: [id number]           Patient Information     Date Of Birth          1940        Visit Information        Provider Department      8/9/2018 11:00 AM Cami Hearn MD [facility]        Today's Diagnoses     Encounter for gynecological examination without abnormal finding    -  1    Breast cancer screening          Care Instructions      PREVENTIVE HEALTH RECOMMENDATIONS:   Most women need a yearly breast and pelvic exam.    A PAP screen, a test done DURING a pelvic exam, is NO longer recommended yearly.    March 2013, screening guidelines recommended by ACOG for PAP screen are:    1) First pap at age 21.    2) Pap every 3 years until age 30.    3) After age 30, pap every 3 years or Pap with HR HPV screen every 5 years until age 65.  4) Women do NOT need a vaginal Pap screen after a hysterectomy (surgical removal of the uterus) when they have not had cancer.    Exceptions:  1) Yearly pap if HIV+ or immunosuppressed secondary to organ transplant  2) MARTIN II-III continue routine screening for 20 years.    I encourage you continue looking for opportunities to choose a healthy lifestyle:       * Choose to eat a heart healthy diet. Check out the FOOD PLATE guidelines at: http://www.choosemyplate.gov/ for helpful hints on weight and cholesterol management.  Balance your caloric intake with exercise to maintain a BMI in the 22 to 26 range. For bone health: Eat calcium-rich foods like yogurt, broccoli or take chewable calcium pills (500 to 600 mg) twice a day with food.       * Exercise for at least an average of 30 minutes a day, 5 days of the week. This will help you control your weight, release stress, and help prevent disease.      * Take a Vitamin D3 supplement daily fall through spring and during summer unless you [id number]' full body sun exposure to skin without sunscreen.      * DO wear sunscreen to prevent  skin cancer after the first 15-30 minutes.      * Identify stressors in your life, find ways to release the stress, and, make time for yourself. PLEASE ask for help if mood changes last longer than two weeks.     * Limit alcohol to one drink per day.  No smoking.  Avoid second hand smoke. If you smoke, ask for help to stop.       *  If you are in a sexual relationship, talk with your partner about possible infection risks and take action to protect yourself from exposure to a sexual infection.    Please request an infection screen for STIs (sexually transmitted infections) if you are less than age 26 OR believe that you may be at risk.     Get a flu shot each year. Get a tetanus shot every 10 years. EVERYONE needs a pertussis (Whooping cough) booster.    See your dentist twice a year for an exam and preventive care cleaning.     Consider the following screen tests:    1) cholesterol test every 5 years.     2) yearly mammogram after age 40 unless you have identified risks.    3) colonoscopy every 10 years after age 50 unless you have identified risks.    4) diabetes blood test screening if you are at risk for diabetes.      Additional information that you may also find helpful:  The Internet now gives us access to LOTS of information -- some of it helpful, research documented and also plenty of harmful, anecdotal information that may not pertain to your situtaion. Consider visiting the following websites for accurate health information:    www.vitamindcouncil.org/ : Info and ongoing research re Vitamin D    www.fairview.org : Up to date and easily searchable information on multiple topics.    www.medlineplus.gov : medication info, interactive tutorials, watch real surgeries online    www.cdc.gov : public health info, travel advisories, epidemics (H1N1)    www.annalise/std.org: current research re diagnosis, treatment and prevention of sexually contacted infections.    www.health.[facility].[place].us : [place] dept of heatlh, public  health issues in [place], N1N1    www.familydoctor.org : good info from the Academy of Family Physicians                Follow-ups after your visit        Follow-up notes from your care team     Return in 1 year (on 8/9/2019) for Preventative Health Visit.      Your next 10 appointments already scheduled     Dec 17, 2018 12:15 PM CST   RETURN GLAUCOMA with Karma Bustillos MD   Eye Clinic ([facility])    [address]  [address]  [facility] [address]   [phone]              Future tests that were ordered for you today     Open Future Orders        Priority Expected Expires Ordered    Mammogram Screening Bilateral Digital [NQR780] Routine  8/9/2019 8/9/2018            Who to contact     Please call your clinic at [phone] to:    Ask questions about your health    Make or cancel appointments    Discuss your medicines    Learn about your test results    Speak to your doctor            Additional Information About Your Visit        [employer] Information     [employer] gives you secure access to your electronic health record. If you see a primary care provider, you can also send messages to your care team and make appointments. If you have questions, please call your primary care clinic.  If you do not have a primary care provider, please call [phone] and they will assist you.      [employer] is an electronic gateway that provides easy, online access to your medical records. With [employer], you can request a clinic appointment, read your test results, renew a prescription or communicate with your care team.     To access your existing account, please contact your [facility] or call [phone] for assistance.        Care EveryWhere ID     This is your Care EveryWhere ID. This could be used by other organizations to access your [place] medical records  [id number]        Your Vitals Were     Pulse Height BMI (Body Mass Index)             64  "1.524 m (5') 25.17 kg/m2          Blood Pressure from Last 3 Encounters:   08/09/18 144/70   05/14/18 145/77   08/01/17 129/75    Weight from Last 3 Encounters:   08/09/18 58.5 kg (128 lb 14.4 oz)   05/14/18 59 kg (130 lb)   09/21/17 59.6 kg (131 lb 8 oz)               Primary Care Provider Office Phone # Fax #    Ana María Rincon [phone] [address] [address]        Equal Access to Services     : Hadii love ibarra hadasho Soomaali, waaxda luqadaha, qaybta kaalmada ademarvelyada, leon matthews . So [facility] [phone].    ATENCIÓN: Si habla español, tiene a tate disposición servicios gratuitos de asistencia lingüística. [facility] [phone].    We comply with applicable federal civil rights laws and [place] laws. We do not discriminate on the basis of race, color, national origin, age, disability, sex, sexual orientation, or gender identity.            Thank you!     Thank you for choosing [facility]  for your care. Our goal is always to provide you with excellent care. Hearing back from our patients is one way we can continue to improve our services. Please take a few minutes to complete the written survey that you may receive in the mail after your visit with us. Thank you!             Your Updated Medication List - Protect others around you: Learn how to safely use, store and throw away your medicines at www.disposemymeds.org.          This list is accurate as of 8/9/18  5:09 PM.  Always use your most recent med list.                   Brand Name Dispense Instructions for use Diagnosis    ADULT NUTRITIONAL SUPPLEMENT OR      Taking \"Estrovera\"        BIOTIN FORTE PO      Take by mouth daily        CALCIUM + D PO      Take 2 tablets by mouth daily.        calcium 600-200 MG-UNIT Tabs     180 tablet    Take 1 tablet by mouth 2 times daily        levothyroxine 50 MCG tablet    SYNTHROID/LEVOTHROID    90 tablet    " Take 1 tablet (50 mcg) by mouth daily    Hypothyroidism       lisinopril 20 MG tablet    PRINIVIL/ZESTRIL    180 tablet    Take 1 tablet (20 mg) by mouth daily    HTN (hypertension)       multivitamin per tablet      Take 1 tablet by mouth daily.        omeprazole 20 MG CR capsule    priLOSEC    90 capsule    Take 1 capsule (20 mg) by mouth daily    Gastroesophageal reflux disease without esophagitis       REFRESH OP      Apply to eye as needed

## 2018-08-09 NOTE — PROGRESS NOTES
"Progress Note    SUBJECTIVE:  Marli Ponce is an 78 year old  , who requests a breast and pelvic exam.    Patient is followed by Dr. Rincon  for primary care.    Concerns today include: None    Denies hot flashes, postmenopausal bleeding, pelvic pain, urinary or bowel problems.    Exercises 5 times weekly by Latin American dancing. Eats a balanced diet of meat, fruit, vegetables.     Menstrual History:  Menstrual History 2013   Reviewed Today Yes   Comments Menopause       Last    Lab Results   Component Value Date    PAP NIL 2012     History of abnormal Pap smear: NO - age 63 - see link Cervical Cytology Screening Guidelines    Mammogram current: no (patient will schedule) and not applicable. Patient thinks it is unlikely she would want treatment if something was found on exam.     HISTORY:  Prescription Medications as of 2018             BIOTIN FORTE PO Take by mouth daily    calcium 600-200 MG-UNIT TABS Take 1 tablet by mouth 2 times daily    Calcium Carbonate-Vitamin D (CALCIUM + D PO) Take 2 tablets by mouth daily.    levothyroxine (SYNTHROID/LEVOTHROID) 50 MCG tablet Take 1 tablet (50 mcg) by mouth daily    lisinopril (PRINIVIL/ZESTRIL) 20 MG tablet Take 1 tablet (20 mg) by mouth daily    Multiple Vitamin (MULTIVITAMIN) per tablet Take 1 tablet by mouth daily.    Nutritional Supplements (ADULT NUTRITIONAL SUPPLEMENT OR) Taking \"Estrovera\"    omeprazole (PRILOSEC) 20 MG CR capsule Take 1 capsule (20 mg) by mouth daily    Polyvinyl Alcohol-Povidone (REFRESH OP) Apply to eye as needed        Allergies   Allergen Reactions     Cosopt [Dorzolamide-Timolol]      No effect       Lumigan      FBS, REDNESS     Xalatan [Latanoprost]      No effect       Immunization History   Administered Date(s) Administered     HEPA 2005     Influenza (IIV3) PF 2008, 10/27/2011, 2012, 10/06/2013, 10/05/2014, 2017     Meningococcal (Menomune ) 2005     Pneumo Conj 13-V (&after) " 2015     Pneumococcal 23 valent 2003, 2014     Poliovirus, inactivated (IPV) 2005     TD (ADULT, 7+) 2004     TDAP Vaccine (Boostrix) 2013     Tdap (Adacel,Boostrix) 2013     Typhoid IM 2005     Yellow Fever 2005     Zoster vaccine, live 2013       Obstetric History       T0      L5     SAB0   TAB0   Ectopic0   Multiple0   Live Births0      Past Medical History:   Diagnosis Date     Cataract      Hyperlipidemia LDL goal < 130      Hypertension     white coat; home blood pressure monitoring 2016  average systolic blood pressure approximately 115.     Hypothyroidism      Low bone density 2017    FRAX tenure probability of fracture, major osteoporotic: 14.6%; hip fracture 4% (increased) Plan:  Alendronate 70 mg weekly; trial basis to assess for reflux esophagitis     Ocular hypertension      Primary open-angle glaucoma     adv     Past Surgical History:   Procedure Laterality Date     CHOLECYSTECTOMY       LAPAROSCOPIC APPENDECTOMY       LASER IRIDOTOMY OD (RIGHT EYE)      RE/LE  pre      LASER IRIDOTOMY OS (LEFT EYE)  2010     SELECTIVE LASER TRABECULOPLASTY (SLT) OS (LEFT EYE)  2010    LE     TRABECULECTOMY, MITOMYCIN FILTER, COMBINED  1/10/2012    LE     Family History   Problem Relation Age of Onset     Cancer Mother 78     stomach and  at 80     Hypertension Mother      Glaucoma Mother      Breast Cancer Sister 38     still living     Hypertension Father      Cerebrovascular Disease Father 52     Glaucoma Maternal Grandfather      Social History     Social History     Marital status:      Spouse name: N/A     Number of children: N/A     Years of education: N/A     Social History Main Topics     Smoking status: Never Smoker     Smokeless tobacco: Never Used     Alcohol use 0.0 oz/week     0 Standard drinks or equivalent per week      Comment: rare tequilla     Drug use: No     Sexual activity: Yes      Partners: Male      Comment: 1962     Other Topics Concern     None     Social History Narrative    Homemaker has 5 children, 7 grand, 3 greatgrandHow much exercise per week? DailyHow much calcium per day? Supplement   How much caffeine per day? NoHow much vitamin D per day? SupplementDo you/your family wear seatbelts?  YesDo you/your family use safety helmets? NoDo you/your family use sunscreen? YesDo you/your family keep firearms in the home? YesDo you/your family have a smoke detector(s)? YesDo you feel safe in your home? YesHas anyone ever touched you in an unwanted manner? No Explain         How much exercise per week? 5     How much calcium per day? daily       How much caffeine per day? none    How much vitamin D per day? Supplement    Do you/your family wear seatbelts?  Yes    Do you/your family use safety helmets? No    Do you/your family use sunscreen? Yes    Do you/your family keep firearms in the home? Yes    Do you/your family have a smoke detector(s)? Yes        Do you feel safe in your home? Yes    Has anyone ever touched you in an unwanted manner? No     Explain     July 16, 2015 Lauren León LPN    Reviewed Gema DOLAN MA 8/1/2017                   ROS    EXAM:  Blood pressure 144/70, pulse 64, height 1.524 m (5'), weight 58.5 kg (128 lb 14.4 oz), not currently breastfeeding. Body mass index is 25.17 kg/(m^2).  General appearance: Pleasant female in no acute distress.     BREAST EXAM:  Breast: Without visible skin changes. No dimpling or lesions seen.   Breasts supple, non-tender with palpation, no dominant mass, nodularity, or nipple discharge noted bilaterally. Axillary nodes negative.      PELVIC EXAM:  EG/BUS: Normal genital architecture without lesions, erythema or abnormal secretions Bartholin's, Urethra, [place]'s normal   Urethral meatus: normal   Vagina: moist, pink,some  rugae with physiologic discharge  secretions  Cervix: Multiparous, and no lesions  Uterus: midposition, and small, smooth,  firm, mobile w/o pain  Adnexa: Within normal limits and No masses, nodularity, tenderness  Rectum:anus normal       ASSESSMENT:  Encounter Diagnoses   Name Primary?     Breast cancer screening      Encounter for gynecological examination without abnormal finding Yes      78 year old Female Pelvic and Breast Exam    PLAN:   Orders Placed This Encounter   Procedures     Mammogram Screening Bilateral Digital [HXI255]   - Ordered mammogram in case patient changes her mind and would like a mammogram this year    Return in one year/PRN for preventive care or problems/concerns.     Verbalized understanding and agreement with visit plan.    Ngoc Canales MD    The patient was seen and examined with Dr. Canales.  I have reviewed and agree with the above note.    Cami Hearn MD, FACOG

## 2018-08-09 NOTE — PATIENT INSTRUCTIONS

## 2018-08-09 NOTE — LETTER
"2018       RE: Marli Ponce  [address]  [facility] [address]     Dear Colleague,    Thank you for referring your patient, Marli Ponce, to the [facility] at [facility]. Please see a copy of my visit note below.    Progress Note    SUBJECTIVE:  Marli Ponce is an 78 year old  , who requests a breast and pelvic exam.    Patient is followed by Dr. Rincon  for primary care.    Concerns today include: None    Denies hot flashes, postmenopausal bleeding, pelvic pain, urinary or bowel problems.    Exercises 5 times weekly by Latin American [employer]. Eats a balanced diet of meat, fruit, vegetables.     Menstrual History:  Menstrual History 2013   Reviewed Today Yes   Comments Menopause       Last    Lab Results   Component Value Date    PAP NIL 2012     History of abnormal Pap smear: NO - age 63 - see link Cervical Cytology Screening Guidelines    Mammogram current: no (patient will schedule) and not applicable. Patient thinks it is unlikely she would want treatment if something was found on exam.     HISTORY:  Prescription Medications as of 2018             BIOTIN FORTE PO Take by mouth daily    calcium 600-200 MG-UNIT TABS Take 1 tablet by mouth 2 times daily    Calcium Carbonate-Vitamin D (CALCIUM + D PO) Take 2 tablets by mouth daily.    levothyroxine (SYNTHROID/LEVOTHROID) 50 MCG tablet Take 1 tablet (50 mcg) by mouth daily    lisinopril (PRINIVIL/ZESTRIL) 20 MG tablet Take 1 tablet (20 mg) by mouth daily    Multiple Vitamin (MULTIVITAMIN) per tablet Take 1 tablet by mouth daily.    Nutritional Supplements (ADULT NUTRITIONAL SUPPLEMENT OR) Taking \"Estrovera\"    omeprazole (PRILOSEC) 20 MG CR capsule Take 1 capsule (20 mg) by mouth daily    Polyvinyl Alcohol-Povidone (REFRESH OP) Apply to eye as needed        Allergies   Allergen Reactions     Cosopt [Dorzolamide-Timolol]      No effect       Lumigan      FBS, REDNESS "     Xalatan [Latanoprost]      No effect       Immunization History   Administered Date(s) Administered     HEPA 2005     Influenza (IIV3) PF 2008, 10/27/2011, 2012, 10/06/2013, 10/05/2014, 2017     Meningococcal (Menomune ) 2005     Pneumo Conj 13-V (2010&after) 2015     Pneumococcal 23 valent 2003, 2014     Poliovirus, inactivated (IPV) 2005     TD (ADULT, 7+) 2004     TDAP Vaccine (Boostrix) 2013     Tdap (Adacel,Boostrix) 2013     Typhoid IM 2005     Yellow Fever 2005     Zoster vaccine, live 2013       Obstetric History       T0      L5     SAB0   TAB0   Ectopic0   Multiple0   Live Births0      Past Medical History:   Diagnosis Date     Cataract      Hyperlipidemia LDL goal < 130      Hypertension     white coat; home blood pressure monitoring 2016  average systolic blood pressure approximately 115.     Hypothyroidism      Low bone density 2017    FRAX tenure probability of fracture, major osteoporotic: 14.6%; hip fracture 4% (increased) Plan:  Alendronate 70 mg weekly; trial basis to assess for reflux esophagitis     Ocular hypertension      Primary open-angle glaucoma     adv     Past Surgical History:   Procedure Laterality Date     CHOLECYSTECTOMY       LAPAROSCOPIC APPENDECTOMY       LASER IRIDOTOMY OD (RIGHT EYE)      RE/LE  pre      LASER IRIDOTOMY OS (LEFT EYE)  2010     SELECTIVE LASER TRABECULOPLASTY (SLT) OS (LEFT EYE)  2010    LE     TRABECULECTOMY, MITOMYCIN FILTER, COMBINED  1/10/2012    LE     Family History   Problem Relation Age of Onset     Cancer Mother 78     stomach and  at 80     Hypertension Mother      Glaucoma Mother      Breast Cancer Sister 38     still living     Hypertension Father      Cerebrovascular Disease Father 52     Glaucoma Maternal Grandfather      Social History     Social History     Marital status:      Spouse name: N/A     Number  of children: N/A     Years of education: N/A     Social History Main Topics     Smoking status: Never Smoker     Smokeless tobacco: Never Used     Alcohol use 0.0 oz/week     0 Standard drinks or equivalent per week      Comment: rare tequilla     Drug use: No     Sexual activity: Yes     Partners: Male      Comment: 1962     Other Topics Concern     None     Social History Narrative    Homemaker has 5 children, 7 grand, 3 greatgrandHow much exercise per week? DailyHow much calcium per day? Supplement   How much caffeine per day? NoHow much vitamin D per day? SupplementDo you/your family wear seatbelts?  YesDo you/your family use safety helmets? NoDo you/your family use sunscreen? YesDo you/your family keep firearms in the home? YesDo you/your family have a smoke detector(s)? YesDo you feel safe in your home? YesHas anyone ever touched you in an unwanted manner? No Explain         How much exercise per week? 5     How much calcium per day? daily       How much caffeine per day? none    How much vitamin D per day? Supplement    Do you/your family wear seatbelts?  Yes    Do you/your family use safety helmets? No    Do you/your family use sunscreen? Yes    Do you/your family keep firearms in the home? Yes    Do you/your family have a smoke detector(s)? Yes        Do you feel safe in your home? Yes    Has anyone ever touched you in an unwanted manner? No     Explain     July 16, 2015 Lauren León LPN    Reviewed Gema DOLAN MA 8/1/2017                   ROS    EXAM:  Blood pressure 144/70, pulse 64, height 1.524 m (5'), weight 58.5 kg (128 lb 14.4 oz), not currently breastfeeding. Body mass index is 25.17 kg/(m^2).  General appearance: Pleasant female in no acute distress.     BREAST EXAM:  Breast: Without visible skin changes. No dimpling or lesions seen.   Breasts supple, non-tender with palpation, no dominant mass, nodularity, or nipple discharge noted bilaterally. Axillary nodes negative.      PELVIC EXAM:  EG/BUS:  Normal genital architecture without lesions, erythema or abnormal secretions Bartholin's, Urethra, [place]'s normal   Urethral meatus: normal   Vagina: moist, pink,some  rugae with physiologic discharge  secretions  Cervix: Multiparous, and no lesions  Uterus: midposition, and small, smooth, firm, mobile w/o pain  Adnexa: Within normal limits and No masses, nodularity, tenderness  Rectum:anus normal       ASSESSMENT:  Encounter Diagnoses   Name Primary?     Breast cancer screening      Encounter for gynecological examination without abnormal finding Yes      78 year old Female Pelvic and Breast Exam    PLAN:   Orders Placed This Encounter   Procedures     Mammogram Screening Bilateral Digital [[id number]]   - Ordered mammogram in case patient changes her mind and would like a mammogram this year    Return in one year/PRN for preventive care or problems/concerns.     Verbalized understanding and agreement with visit plan.    Ngoc Canales MD    The patient was seen and examined with Dr. Canales.  I have reviewed and agree with the above note.    Again, thank you for allowing me to participate in the care of your patient.      Sincerely,    Cami Hearn MD

## 2018-09-14 DIAGNOSIS — K21.9 GASTROESOPHAGEAL REFLUX DISEASE WITHOUT ESOPHAGITIS: ICD-10-CM

## 2018-10-14 ENCOUNTER — HEALTH MAINTENANCE LETTER (OUTPATIENT)
Age: 78
End: 2018-10-14

## 2018-11-09 ENCOUNTER — TELEPHONE (OUTPATIENT)
Dept: INTERNAL MEDICINE | Facility: CLINIC | Age: 78
End: 2018-11-09

## 2018-11-09 DIAGNOSIS — E03.9 HYPOTHYROIDISM: ICD-10-CM

## 2018-11-09 RX ORDER — LEVOTHYROXINE SODIUM 50 UG/1
50 TABLET ORAL DAILY
Qty: 90 TABLET | Refills: 1 | Status: SHIPPED | OUTPATIENT
Start: 2018-11-09 | End: 2019-05-04

## 2018-11-09 NOTE — TELEPHONE ENCOUNTER
[facility] Call Center    Phone Message    May a detailed message be left on voicemail: yes    Reason for Call: Medication Refill Request    Has the patient contacted the pharmacy for the refill? Yes   Name of medication being requested: levothyroxine (SYNTHROID/LEVOTHROID) 50 MCG tablet  Provider who prescribed the medication: AMEYA Rincon  Pharmacy: Request to send RX to [facility] in [place]. Raven Shepard in [facility].   Date medication is needed: 3 days        Action Taken: Message routed to:  [facility] ([facility] NANCY

## 2018-12-17 ENCOUNTER — OFFICE VISIT (OUTPATIENT)
Dept: OPHTHALMOLOGY | Facility: CLINIC | Age: 78
End: 2018-12-17
Attending: OPHTHALMOLOGY
Payer: MEDICARE

## 2018-12-17 DIAGNOSIS — H40.1192 PRIMARY OPEN-ANGLE GLAUCOMA (POAG), MODERATE STAGE: Primary | ICD-10-CM

## 2018-12-17 DIAGNOSIS — I10 ESSENTIAL HYPERTENSION, BENIGN: ICD-10-CM

## 2018-12-17 PROCEDURE — G0463 HOSPITAL OUTPT CLINIC VISIT: HCPCS | Mod: ZF

## 2018-12-17 PROCEDURE — 92133 CPTRZD OPH DX IMG PST SGM ON: CPT | Mod: ZF | Performed by: OPHTHALMOLOGY

## 2018-12-17 ASSESSMENT — TONOMETRY
OD_IOP_MMHG: 13
OS_IOP_MMHG: 10
IOP_METHOD: APPLANATION

## 2018-12-17 ASSESSMENT — REFRACTION_WEARINGRX
OS_AXIS: 010
OS_ADD: +2.75
OD_AXIS: 170
OS_CYLINDER: +1.50
OD_ADD: +2.75
OS_SPHERE: +0.75
OD_SPHERE: -0.75
OD_CYLINDER: +1.75

## 2018-12-17 ASSESSMENT — VISUAL ACUITY
OS_CC: 20/30
METHOD: SNELLEN - LINEAR
OD_CC: 20/20

## 2018-12-17 ASSESSMENT — CONF VISUAL FIELD
OD_NORMAL: 1
OS_NORMAL: 1

## 2018-12-17 ASSESSMENT — SLIT LAMP EXAM - LIDS
COMMENTS: NORMAL
COMMENTS: NORMAL

## 2018-12-17 ASSESSMENT — CUP TO DISC RATIO
OS_RATIO: 0.8
OD_RATIO: 0.7

## 2018-12-17 NOTE — PROGRESS NOTES
CC: follow-up Primary open angle glaucoma (POAG) moderate  Maximum intraocular pressure 24 per patient    HPI: no new issues    Ocular surgeries:   Trabeculectomy mitomycin-C and Cataract extraction with intraocular lens right eye (6/2/2015)   Trabeculectomy mitomycin-C left eye 1-10-12 (1 minute mitomycin-C and 3 flap sutures)    Ocular Rx:  No gtts    Octopus visual field 6/4/18 24-2   Stable both eyes except worse MD left eye (cataract)    OCT retinal nerve fiber layer 12/17/18   OD significant thinning compared to 3/2014 (pt had phaco/trab OD in 6/2015) but stable since   OS stable compared to 3/2014    Assessment :    1) Primary open angle glaucoma (POAG) adv left eye   Trabeculectomy mitomycin-C left eye 1-10-12 (1 minute mitomycin-C and 3 flap sutures)  Trabeculectomy mitomycin-C and Cataract extraction with intraocular lens right eye (6/3/2015)    2) Cataract, left eye-mild  Likely visually significant  Pt prefers to monitor for now     Plan  RV 6 months Octopus visual field 24-2    Attending Physician Attestation:  Complete documentation of historical and exam elements from today's encounter can be found in the full encounter summary report (not reduplicated in this progress note). I personally obtained the chief complaint(s) and history of present illness. I confirmed and edited asnecessary the review of systems, past medical/surgical history, family history, social history, and examination findings as documented by others; and I examined the patient myself. I personally reviewed the relevant tests, images, and reports as documented above. I formulated and edited as necessary the assessment and plan and discussed the findings and management plan with the patient and family.  - Karma Bustillos MD 1:21 PM 12/17/2018

## 2019-02-21 ENCOUNTER — TELEPHONE (OUTPATIENT)
Dept: DERMATOLOGY | Facility: CLINIC | Age: 79
End: 2019-02-21

## 2019-02-21 NOTE — TELEPHONE ENCOUNTER
[facility] Call Center    Phone Message    May a detailed message be left on voicemail: yes    Reason for Call: Other: Dr Ponce called to make an appt for the Pt.  I explained there was a bit of a delay for appts and he still wanted her to see Dr Moralez.  He asked thaat I send a message to Dr Prado to ask if she could see the Pt sooner.  Please call back Dr Ponce regarding scheduling for the Pt.     Action Taken: Message routed to:  [facility] ([facility] dermatology

## 2019-02-26 ENCOUNTER — DOCUMENTATION ONLY (OUTPATIENT)
Dept: CARE COORDINATION | Facility: CLINIC | Age: 79
End: 2019-02-26

## 2019-02-26 ENCOUNTER — OFFICE VISIT (OUTPATIENT)
Dept: DERMATOLOGY | Facility: CLINIC | Age: 79
End: 2019-02-26
Payer: MEDICARE

## 2019-02-26 DIAGNOSIS — L57.8 DIFFUSE PHOTODAMAGE OF SKIN: Primary | ICD-10-CM

## 2019-02-26 DIAGNOSIS — L82.1 SEBORRHEIC KERATOSES: ICD-10-CM

## 2019-02-26 DIAGNOSIS — D22.9 MULTIPLE MELANOCYTIC NEVI: ICD-10-CM

## 2019-02-26 ASSESSMENT — PAIN SCALES - GENERAL: PAINLEVEL: NO PAIN (0)

## 2019-02-26 NOTE — PROGRESS NOTES
[facility] Dermatology Note    Dermatology Problem List:  1. iSKs - cryo    Encounter Date: Feb 26, 2019    CC:   Chief Complaint   Patient presents with     Skin Check     Marli is here today for a mole she would like looked at that is on her butt.      History of Present Illness:  Ms. Marli Ponce is a 78 year old female who presents in self referral for evaluation of a mole on her upper right thigh/hip. Her  brought it to her attn about a week ago and she had never noticed it before that. She denies any assoc symptoms including itching, burning, pain, etc. She has no other skin concerns and declined FBSE. She was last seen in 2009 when she had several pruritic SKs treated with cryotherapy.     She has no other concerns at today's visit. She has been in usual state of health.     Past Medical History:   Patient Active Problem List   Diagnosis     Hyperlipidemia     Hypothyroidism     Disorder of bone and cartilage     Essential hypertension, benign     Hip pain     Chronic gastritis     Transient amnesia     Primary open angle glaucoma, moderate stage [H40.11X2]     Primary osteoarthritis of right hip     Low bone density     ACP (advance care planning)     Past Medical History:   Diagnosis Date     Cataract      Hyperlipidemia LDL goal < 130      Hypertension     white coat; home blood pressure monitoring April 2016  average systolic blood pressure approximately 115.     Hypothyroidism      Low bone density 8/16/2017    FRAX tenure probability of fracture, major osteoporotic: 14.6%; hip fracture 4% (increased) Plan:  Alendronate 70 mg weekly; trial basis to assess for reflux esophagitis     Ocular hypertension      Primary open-angle glaucoma     adv     Past Surgical History:   Procedure Laterality Date     CHOLECYSTECTOMY       LAPAROSCOPIC APPENDECTOMY       LASER IRIDOTOMY OD (RIGHT EYE)      RE/LE  pre 2003     LASER IRIDOTOMY OS (LEFT EYE)  11/20/2010     SELECTIVE LASER  "TRABECULOPLASTY (SLT) OS (LEFT EYE)  2010    LE     TRABECULECTOMY, MITOMYCIN FILTER, COMBINED  1/10/2012    LE       Social History:  Patient reports that  has never smoked. she has never used smokeless tobacco. She reports that she drinks alcohol. She reports that she does not use drugs.    Family History:  Family History   Problem Relation Age of Onset     Cancer Mother 78        stomach and  at 80     Hypertension Mother      Glaucoma Mother      Breast Cancer Sister 38        still living     Hypertension Father      Cerebrovascular Disease Father 52     Glaucoma Maternal Grandfather        Medications:  Current Outpatient Medications   Medication Sig Dispense Refill     BIOTIN FORTE PO Take by mouth daily       calcium 600-200 MG-UNIT TABS Take 1 tablet by mouth 2 times daily 180 tablet 3     Calcium Carbonate-Vitamin D (CALCIUM + D PO) Take 2 tablets by mouth daily.       hydrochlorothiazide (HYDRODIURIL) 25 MG tablet Take 0.5 tablets (12.5 mg) by mouth daily 45 tablet 3     levothyroxine (SYNTHROID/LEVOTHROID) 50 MCG tablet Take 1 tablet (50 mcg) by mouth daily 90 tablet 1     lisinopril (PRINIVIL/ZESTRIL) 20 MG tablet TAKE 1 TABLET BY MOUTH DAILY 90 tablet 2     Multiple Vitamin (MULTIVITAMIN) per tablet Take 1 tablet by mouth daily.       Nutritional Supplements (ADULT NUTRITIONAL SUPPLEMENT OR) Taking \"Estrovera\"       omeprazole (PRILOSEC) 20 MG CR capsule TAKE 1 CAPSULE(20 MG) BY MOUTH DAILY 90 capsule 2     Polyvinyl Alcohol-Povidone (REFRESH OP) Apply to eye as needed          Allergies   Allergen Reactions     Cosopt [Dorzolamide-Timolol]      No effect       Lumigan      FBS, REDNESS     Xalatan [Latanoprost]      No effect       Review of Systems:  -As per HPI  -Constitutional: Otherwise feeling well today, in usual state of health.  -HEENT: Patient denies nonhealing oral sores.  -Skin: As above in HPI. No additional skin concerns.    Physical exam:  GEN: This is a well developed, " well-nourished female in no acute distress, in a pleasant mood.    SKIN: Focused examination of the face and left hip was performed.  -regular dark brown pigmented macules identified on the right hip with even reticular network  -There are waxy stuck on tan to brown papules on the right hip and face.  - several 1-2mm red domed papules on the right hip  -There are fine lines and dyspigmentation on sun exposed areas of the face  -No other lesions of concern on areas examined.                 Impression/Plan:  1. Seborrheic keratosis, non irritated, and cherry angiomata    No further intervention required. Patient to report changes.     2. Clinically benign nevi    ABCDs of melanoma were discussed and self skin checks were advised.     3. Diffuse photoaging    Sun precaution was advised including the use of sun screens of SPF 30 or higher, sun protective clothing, and avoidance of tanning beds.    CC Referred MD Tim  No address on file on close of this encounter.    Follow-up prn for new or changing lesions.    Dr. Prado staffed the patient.    Staff Involved:  Resident (Jessica Grajeda MD) / Staff (as above)    Patient was seen and examined with the dermatology resident. I agree with the history, review of systems, physical examination, assessments and plan.    Adelina Prado MD  Professor and  Chair  Department of Dermatology  [facility]

## 2019-02-26 NOTE — PATIENT INSTRUCTIONS
Learning the ABCDEs or Melanomas / Self Skin checks    Even if you have carefully practiced sun safety all summer, it's important to continue being vigilant about your skin in fall, winter, and beyond. Throughout the year, you should examine your skin head-to-toe once a month, looking for any suspicious lesions. Self-exams can help you identify potential skin cancers early, when they can almost always be completely cured. As a general rule, to spot either melanomas or non-melanoma skin cancers (such as basal cell carcinoma and squamous cell carcinoma), take note of any new moles or growths, and any existing growths that begin to grow or change significantly in any other way.  Lesions that change, itch, bleed, or don't heal are also alarm signals. Physicians have developed two specific strategies for early recognition ofmelanoma, the deadliest form of skin cancer: the ABCDEs and the Ugly Duckling sign.      Moles, brown spots and growths on the skin are usually harmless -- but not always. Anyone who has more than 100 moles is at greater risk for melanoma. The first signs can appear in one or more atypical moles. That's why it's so important to get to know your skin very well and to recognize any changes in the moles on your body. Look for the ABCDE signs of melanoma, and if you see one or more, make an appointment with a physician immediately.    Common, benign moles look the same over time. Be on alert when moles start to evolve or change and see a doctor. Any change -- in size, shape, color, elevation, or another trait, or any new symptom such as bleeding, itching or crusting -- points to danger.    A - Asymmetry  This benign mole is not asymmetrical. If you draw a line through the middle, the two sides will match, meaning it is symmetrical. If you draw a line through this mole, the two halves will not match, meaning it is asymmetrical, a warning sign for melanoma.    B - Border  A benign mole has smooth, even  borders, unlike melanomas. The borders of an early melanoma tend to be uneven. The edges may be scalloped or notched.     C - Color  Most benign moles are all one color -- often a single shade of brown. Having a variety of colors is another warning signal. A number of different shades of brown, tan or black could appear. A melanoma may also become red, white or blue.      D - Diameter  Benign moles usually have a smaller diameter than malignant ones. Melanomas usually are larger in diameter than the eraser on your pencil tip (  inch or 6mm), but they may sometimes be smaller when first detected.      E - Evolution  Common, benign moles look the same over time. Be on the alert when a mole starts to evolve or change in any way. When a mole is evolving, see a doctor. Any change -- in size, shape, color, elevation, or another trait, or any new symptom such as bleeding, itching or crusting -- points to danger.    The Ugly Duckling Rule  This is a simplified method where you look for any moles that do not match the other moles you have. Even if some of your moles look a little funny we are less concerned if they match one another. We are looking for the outlier - the one that is darker, larger, etc. In A, there is one dominant mole pattern with slight variation in size. The outlier lesion is clearly darker and larger than all other moles. In B, the patient has two predominant mole patterns, one with larger nevi and one with small, darker nevi. The outlier lesion is small but lacks pigmentation. In C, the patient shows only one lesion on the back. If this lesion is changing, symptomatic, or deemed atypical, it should be removed.      Seborrheic keratoses - a mimicker of melanoma    Seborrheic keratosis (seb-o-REE-ik care-uh-TOE-sis) is a common skin growth. It may seem worrisome because it can look like a wart, pre-cancerous skin growth (actinic keratosis), or skin cancer. Despite their appearance, seborrheic keratoses are  "harmless.    Most people get these growths when they are middle aged or older. Because they begin at a later age and can have a flat, waxy or wart-like appearance, seborrheic keratoses are often called the  barnacles of aging  or \"wisdom spots\".    It s possible to have just one of these growths, but most people develop several. Seborrheic keratoses range in color from white to black; however, most are tan or brown. You can find these harmless growths anywhere on the skin, except the palms and soles. Most often, you ll see them on the chest, back, head, or neck. Seborrheic keratoses are not contagious.      "

## 2019-02-26 NOTE — NURSING NOTE
Dermatology Rooming Note    Marli Ponce's goals for this visit include:   Chief Complaint   Patient presents with     Skin Check     Marli is here today for a mole she would like looked at that is on her butt.      SHIVANI Maya

## 2019-02-26 NOTE — LETTER
2/26/2019       RE: Marli Ponce  [address] [address]     Dear Colleague,    Thank you for referring your patient, Marli Ponce, to the [facility] DERMATOLOGY at [facility]. Please see a copy of my visit note below.    [facility] Dermatology Note    Dermatology Problem List:  1. iSKs - cryo    Encounter Date: Feb 26, 2019    CC:   Chief Complaint   Patient presents with     Skin Check     Marli is here today for a mole she would like looked at that is on her butt.      History of Present Illness:  Ms. Marli Pnoce is a 78 year old female who presents in self referral for evaluation of a mole on her upper right thigh/hip. Her  brought it to her attn about a week ago and she had never noticed it before that. She denies any assoc symptoms including itching, burning, pain, etc. She has no other skin concerns and declined FBSE. She was last seen in 2009 when she had several pruritic SKs treated with cryotherapy.     She has no other concerns at today's visit. She has been in usual state of health.     Past Medical History:   Patient Active Problem List   Diagnosis     Hyperlipidemia     Hypothyroidism     Disorder of bone and cartilage     Essential hypertension, benign     Hip pain     Chronic gastritis     Transient amnesia     Primary open angle glaucoma, moderate stage [H40.11X2]     Primary osteoarthritis of right hip     Low bone density     ACP (advance care planning)     Past Medical History:   Diagnosis Date     Cataract      Hyperlipidemia LDL goal < 130      Hypertension     white coat; home blood pressure monitoring April 2016  average systolic blood pressure approximately 115.     Hypothyroidism      Low bone density 8/16/2017    FRAX tenure probability of fracture, major osteoporotic: 14.6%; hip fracture 4% (increased) Plan:  Alendronate 70 mg weekly; trial basis to assess for reflux esophagitis     Ocular  "hypertension      Primary open-angle glaucoma     adv     Past Surgical History:   Procedure Laterality Date     CHOLECYSTECTOMY       LAPAROSCOPIC APPENDECTOMY       LASER IRIDOTOMY OD (RIGHT EYE)      RE/LE  pre      LASER IRIDOTOMY OS (LEFT EYE)  2010     SELECTIVE LASER TRABECULOPLASTY (SLT) OS (LEFT EYE)  2010    LE     TRABECULECTOMY, MITOMYCIN FILTER, COMBINED  1/10/2012    LE       Social History:  Patient reports that  has never smoked. she has never used smokeless tobacco. She reports that she drinks alcohol. She reports that she does not use drugs.    Family History:  Family History   Problem Relation Age of Onset     Cancer Mother 78        stomach and  at 80     Hypertension Mother      Glaucoma Mother      Breast Cancer Sister 38        still living     Hypertension Father      Cerebrovascular Disease Father 52     Glaucoma Maternal Grandfather        Medications:  Current Outpatient Medications   Medication Sig Dispense Refill     BIOTIN FORTE PO Take by mouth daily       calcium 600-200 MG-UNIT TABS Take 1 tablet by mouth 2 times daily 180 tablet 3     Calcium Carbonate-Vitamin D (CALCIUM + D PO) Take 2 tablets by mouth daily.       hydrochlorothiazide (HYDRODIURIL) 25 MG tablet Take 0.5 tablets (12.5 mg) by mouth daily 45 tablet 3     levothyroxine (SYNTHROID/LEVOTHROID) 50 MCG tablet Take 1 tablet (50 mcg) by mouth daily 90 tablet 1     lisinopril (PRINIVIL/ZESTRIL) 20 MG tablet TAKE 1 TABLET BY MOUTH DAILY 90 tablet 2     Multiple Vitamin (MULTIVITAMIN) per tablet Take 1 tablet by mouth daily.       Nutritional Supplements (ADULT NUTRITIONAL SUPPLEMENT OR) Taking \"Estrovera\"       omeprazole (PRILOSEC) 20 MG CR capsule TAKE 1 CAPSULE(20 MG) BY MOUTH DAILY 90 capsule 2     Polyvinyl Alcohol-Povidone (REFRESH OP) Apply to eye as needed          Allergies   Allergen Reactions     Cosopt [Dorzolamide-Timolol]      No effect       Lumigan      FBS, REDNESS     Xalatan [Latanoprost]  "     No effect       Review of Systems:  -As per HPI  -Constitutional: Otherwise feeling well today, in usual state of health.  -HEENT: Patient denies nonhealing oral sores.  -Skin: As above in HPI. No additional skin concerns.    Physical exam:  GEN: This is a well developed, well-nourished female in no acute distress, in a pleasant mood.    SKIN: Focused examination of the face and left hip was performed.  -regular dark brown pigmented macules identified on the right hip with even reticular network  -There are waxy stuck on tan to brown papules on the right hip and face.  - several 1-2mm red domed papules on the right hip  -There are fine lines and dyspigmentation on sun exposed areas of the face  -No other lesions of concern on areas examined.                 Impression/Plan:  1. Seborrheic keratosis, non irritated, and cherry angiomata    No further intervention required. Patient to report changes.     2. Clinically benign nevi    ABCDs of melanoma were discussed and self skin checks were advised.     3. Diffuse photoaging    Sun precaution was advised including the use of sun screens of SPF 30 or higher, sun protective clothing, and avoidance of tanning beds.    CC Referred MD Tim  No address on file on close of this encounter.    Follow-up prn for new or changing lesions.    Dr. Prado staffed the patient.    Staff Involved:  Resident (Jessica Grajeda MD) / Staff (as above)    Patient was seen and examined with the dermatology resident. I agree with the history, review of systems, physical examination, assessments and plan.    Adelina Prado MD  Professor and  Chair  Department of Dermatology  [facility]

## 2019-05-04 DIAGNOSIS — E03.9 HYPOTHYROIDISM: ICD-10-CM

## 2019-05-07 RX ORDER — LEVOTHYROXINE SODIUM 50 UG/1
TABLET ORAL
Qty: 90 TABLET | Refills: 0 | Status: SHIPPED | OUTPATIENT
Start: 2019-05-07 | End: 2019-07-02

## 2019-05-07 NOTE — TELEPHONE ENCOUNTER
levothyroxine (SYNTHROID/LEVOTHROID) 50 MCG tablet       Last Written Prescription Date:  11/9/18  Last Fill Quantity: 90,   # refills: 1  Last Office Visit : 5/14/18  Future Office visit: 5/20/19    90 day to pharmacy.

## 2019-06-08 DIAGNOSIS — K21.9 GASTROESOPHAGEAL REFLUX DISEASE WITHOUT ESOPHAGITIS: ICD-10-CM

## 2019-06-10 NOTE — TELEPHONE ENCOUNTER
omeprazole (PRILOSEC) 20 MG CR capsule      Last Written Prescription Date:  9/17/18  Last Fill Quantity: 90,   # refills: 2  Last Office Visit : 5/14/18  Future Office visit:  7/2/19    90 day to pharmacy.

## 2019-06-23 DIAGNOSIS — I10 HTN (HYPERTENSION): ICD-10-CM

## 2019-06-27 DIAGNOSIS — I10 HTN (HYPERTENSION): ICD-10-CM

## 2019-06-27 RX ORDER — LISINOPRIL 20 MG/1
20 TABLET ORAL DAILY
Qty: 90 TABLET | Refills: 0 | Status: SHIPPED | OUTPATIENT
Start: 2019-06-27 | End: 2019-07-02

## 2019-06-27 RX ORDER — LISINOPRIL 20 MG/1
TABLET ORAL
Qty: 90 TABLET | Refills: 0 | OUTPATIENT
Start: 2019-06-27

## 2019-06-27 NOTE — TELEPHONE ENCOUNTER
[facility] Call Center    Phone Message    May a detailed message be left on voicemail: yes    Reason for Call: Medication Refill Request    Has the patient contacted the pharmacy for the refill? Yes   Name of medication being requested:  lisinopril (PRINIVIL/ZESTRIL) 20 MG tablet  Provider who prescribed the medication: Sergey  Pharmacy:  [facility] [address] [place] [address] [address] AT [facility] [address]    Date medication is needed: ASAP - we received request 6/23/19, patient has one pill left.         Action Taken: Message routed to:  Other: Med Refill Team

## 2019-06-27 NOTE — TELEPHONE ENCOUNTER
Last Clinic Visit: 5-14-18  Next appointment 7-2-19  Overdue for Creat and Potassium - clinic notified

## 2019-07-02 ENCOUNTER — OFFICE VISIT (OUTPATIENT)
Dept: INTERNAL MEDICINE | Facility: CLINIC | Age: 79
End: 2019-07-02
Payer: MEDICARE

## 2019-07-02 VITALS
SYSTOLIC BLOOD PRESSURE: 155 MMHG | WEIGHT: 129.1 LBS | HEIGHT: 61 IN | HEART RATE: 61 BPM | BODY MASS INDEX: 24.37 KG/M2 | DIASTOLIC BLOOD PRESSURE: 77 MMHG | TEMPERATURE: 98.4 F | OXYGEN SATURATION: 99 %

## 2019-07-02 DIAGNOSIS — M17.12 OSTEOARTHRITIS OF LEFT KNEE, UNSPECIFIED OSTEOARTHRITIS TYPE: ICD-10-CM

## 2019-07-02 DIAGNOSIS — E03.9 HYPOTHYROIDISM, UNSPECIFIED TYPE: ICD-10-CM

## 2019-07-02 DIAGNOSIS — I10 ESSENTIAL HYPERTENSION, BENIGN: ICD-10-CM

## 2019-07-02 DIAGNOSIS — M81.0 AGE RELATED OSTEOPOROSIS, UNSPECIFIED PATHOLOGICAL FRACTURE PRESENCE: Primary | ICD-10-CM

## 2019-07-02 LAB
ANION GAP SERPL CALCULATED.3IONS-SCNC: 6 MMOL/L (ref 3–14)
BUN SERPL-MCNC: 12 MG/DL (ref 7–30)
CALCIUM SERPL-MCNC: 9.3 MG/DL (ref 8.5–10.1)
CHLORIDE SERPL-SCNC: 101 MMOL/L (ref 94–109)
CO2 SERPL-SCNC: 26 MMOL/L (ref 20–32)
CREAT SERPL-MCNC: 0.87 MG/DL (ref 0.52–1.04)
GFR SERPL CREATININE-BSD FRML MDRD: 62 ML/MIN/{1.73_M2}
GLUCOSE SERPL-MCNC: 89 MG/DL (ref 70–99)
POTASSIUM SERPL-SCNC: 4 MMOL/L (ref 3.4–5.3)
SODIUM SERPL-SCNC: 134 MMOL/L (ref 133–144)
TSH SERPL DL<=0.005 MIU/L-ACNC: 0.82 MU/L (ref 0.4–4)

## 2019-07-02 RX ORDER — LEVOTHYROXINE SODIUM 50 UG/1
50 TABLET ORAL DAILY
Qty: 90 TABLET | Refills: 3 | Status: SHIPPED | OUTPATIENT
Start: 2019-07-02 | End: 2020-07-02

## 2019-07-02 RX ORDER — LISINOPRIL 20 MG/1
20 TABLET ORAL DAILY
Qty: 90 TABLET | Refills: 3 | Status: SHIPPED | OUTPATIENT
Start: 2019-07-02 | End: 2020-07-02

## 2019-07-02 ASSESSMENT — ENCOUNTER SYMPTOMS
NECK PAIN: 0
LIGHT-HEADEDNESS: 0
HYPERTENSION: 0
LEG PAIN: 1
SLEEP DISTURBANCES DUE TO BREATHING: 0
MYALGIAS: 1
STIFFNESS: 0
ORTHOPNEA: 0
ARTHRALGIAS: 1
SYNCOPE: 0
HYPOTENSION: 0
JOINT SWELLING: 0
MUSCLE WEAKNESS: 0
PALPITATIONS: 0
BACK PAIN: 1
MUSCLE CRAMPS: 1
EXERCISE INTOLERANCE: 0

## 2019-07-02 ASSESSMENT — PATIENT HEALTH QUESTIONNAIRE - PHQ9
SUM OF ALL RESPONSES TO PHQ QUESTIONS 1-9: 1
SUM OF ALL RESPONSES TO PHQ QUESTIONS 1-9: 1

## 2019-07-02 ASSESSMENT — PAIN SCALES - GENERAL: PAINLEVEL: NO PAIN (0)

## 2019-07-02 ASSESSMENT — ACTIVITIES OF DAILY LIVING (ADL)
IN_THE_PAST_7_DAYS,_DID_YOU_NEED_HELP_FROM_OTHERS_TO_TAKE_CARE_OF_THINGS_SUCH_AS_LAUNDRY_AND_HOUSEKEEPING,_BANKING,_SHOPPING,_USING_THE_TELEPHONE,_FOOD_PREPARATION,_TRANSPORTATION,_OR_TAKING_YOUR_OWN_MEDICATIONS?: N
IN_THE_PAST_7_DAYS,_DID_YOU_NEED_HELP_FROM_OTHERS_TO_PERFORM_EVERYDAY_ACTIVITIES_SUCH_AS_EATING,_GETTING_DRESSED,_GROOMING,_BATHING,_WALKING,_OR_USING_THE_TOILET: N

## 2019-07-02 ASSESSMENT — MIFFLIN-ST. JEOR: SCORE: 990.22

## 2019-07-02 NOTE — NURSING NOTE
"Chief Complaint   Patient presents with     Physical     Pt is here for annual physical       Vital signs:  Temp: 98.4  F (36.9  C) Temp src: Oral BP: 155/77(2nd attempt) Pulse: 61     SpO2: 99 %     Height: 154.5 cm (5' 0.83\") Weight: 58.6 kg (129 lb 1.6 oz)  Estimated body mass index is 24.53 kg/m  as calculated from the following:    Height as of this encounter: 1.545 m (5' 0.83\").    Weight as of this encounter: 58.6 kg (129 lb 1.6 oz).      SMA Nicko at 4:13 PM on 7/2/2019  "

## 2019-07-02 NOTE — PROGRESS NOTES
[facility]  Primary Care Center   Ana María Rincon MD  07/02/2019      Chief Complaint:   Physical       History of Present Illness:   Marli Ponce is a 78 year old female with a history of hyperlipidemia, hypertension, and hypothyroidism who presents alone for a physical.    Left knee pain: She endorses pain in her left knee which she first experienced while she was walking. She denies swelling. She thinks this could be arthritis. She applied some muscle cream for muscles to her knee which helped in alleviating her pain. She notes she did not walk at all this past week which also helped in alleviating her pain.     Essential hypertension:  She has been checking her blood pressure at home and notes it is typically 130/76. She stopped taking Hydrochlorothiazide and has only been taking Lisinopril. She denies any chest pain or pounding.     Routine healthcare maintenance: She is due for a DEXA scan. Her last one was done 2 years ago. Her immunizations are up to date. She no longer does colonoscopies. Her last mammogram in 2017 was normal. She has not noticed any lumps or abnormalities in her breasts.     Other concerns discussed:  1. Only takes Prilosec as needed for reflux     Review of Systems:   Pertinent items are noted in HPI or as in patient entered ROS below, remainder of complete ROS is negative.  Answers for HPI/ROS submitted by the patient on 7/2/2019   PHQ9 TOTAL SCORE: 1  General Symptoms: No  Skin Symptoms: No  HENT Symptoms: No  EYE SYMPTOMS: No  HEART SYMPTOMS: Yes  LUNG SYMPTOMS: No  INTESTINAL SYMPTOMS: No  URINARY SYMPTOMS: No  GYNECOLOGIC SYMPTOMS: No  BREAST SYMPTOMS: No  SKELETAL SYMPTOMS: Yes  BLOOD SYMPTOMS: No  NERVOUS SYSTEM SYMPTOMS: No  MENTAL HEALTH SYMPTOMS: No  Chest pain or pressure: No  Fast or irregular heartbeat: No  Pain in legs with walking: Yes  Trouble breathing while lying down: No  Fingers or toes appear blue: No  High blood pressure: No  Low blood pressure: No  Fainting:  "No  Murmurs: No  Pacemaker: No  Varicose veins: Yes  Edema or swelling: No  Wake up at night with shortness of breath: No  Light-headedness: No  Exercise intolerance: No  Back pain: Yes  Muscle aches: Yes  Neck pain: No  Swollen joints: No  Joint pain: Yes  Bone pain: No  Muscle cramps: Yes  Muscle weakness: No  Joint stiffness: No  Bone fracture: No      Active Medications:      BIOTIN FORTE PO, Take by mouth daily, Disp: , Rfl:      calcium 600-200 MG-UNIT TABS, Take 1 tablet by mouth 2 times daily, Disp: 180 tablet, Rfl: 3     Calcium Carbonate-Vitamin D (CALCIUM + D PO), Take 2 tablets by mouth daily., Disp: , Rfl:      hydrochlorothiazide (HYDRODIURIL) 25 MG tablet, Take 0.5 tablets (12.5 mg) by mouth daily, Disp: 45 tablet, Rfl: 3     levothyroxine (SYNTHROID/LEVOTHROID) 50 MCG tablet, TAKE 1 TABLET BY MOUTH DAILY, Disp: 90 tablet, Rfl: 0     lisinopril (PRINIVIL/ZESTRIL) 20 MG tablet, Take 1 tablet (20 mg) by mouth daily, Disp: 90 tablet, Rfl: 0     Multiple Vitamin (MULTIVITAMIN) per tablet, Take 1 tablet by mouth daily., Disp: , Rfl:      Nutritional Supplements (ADULT NUTRITIONAL SUPPLEMENT OR), Taking \"Estrovera\", Disp: , Rfl:      omeprazole (PRILOSEC) 20 MG DR capsule, TAKE 1 CAPSULE(20 MG) BY MOUTH DAILY, Disp: 90 capsule, Rfl: 0     Polyvinyl Alcohol-Povidone (REFRESH OP), Apply to eye as needed, Disp: , Rfl:       Allergies:   Cosopt [dorzolamide-timolol]  Lumigan  Xalatan [latanoprost]      Past Medical History:  Hyperlipidemia    Hypertension   Hypothyroidism   Low bone density  Ocular hypertension   Primary open-angle glaucoma  Chronic gastritis    Transient amnesia     Past Surgical History:  Cholecystectomy   Laparoscopic appendectomy   Laser iridotomy right eye   Laser iridotomy left eye - 11/20/10  Selective laser trabeculoplasty left eye - 1/22/10  Trabeculectomy, mitomycin filter, combined left eye - 1/10/12    Family History:   Stomach cancer - mother ( age 80)  Hypertension - mother, " "father  Glaucoma - mother, maternal grandfather   Breast cancer - sister  Cerebrovascular disease - father      Social History:   The patient is , a nonsmoker, and does rarely consume alcohol.      Physical Exam:   /77   Pulse 61   Temp 98.4  F (36.9  C) (Oral)   Ht 1.545 m (5' 0.83\")   Wt 58.6 kg (129 lb 1.6 oz)   SpO2 99%   BMI 24.53 kg/m     Constitutional: Healthy, alert, no distress and cooperative  Head: Normocephalic. No masses, lesions, tenderness or abnormalities  ENT: Bilateral TM normal without fluid or infection, throat normal without erythema or exudate, no cervical lymphadenopathy  Cardiovascular: JVP 7 cm. RRR, S1,S2 no murmurs, clicks, rubs, or gallops. No pretibial edema.  No carotid bruits. Radial and brachial pulses normal and symmetric. Right and left femoral pulses normal.   Respiratory: Clear to auscultation bilaterally.   Gastrointestinal: BS NA. Soft, nontender, no hepatosplenomegaly or mass. Liver is normal in size at the midclavicular line.  There is no CVA or flank tenderness bilaterally.  Extremities:  Left knee: Full range of motion without crepitance.  Tender over the medial joint line, slight lateral joint tenderness, some fluid present, anserine bursa non-tender, ACL intact, medial and lateral collateral ligaments intact.   Psychiatric: Mentation appears normal and affect normal  Hematologic/Lymphatic/Immunologic: Normal cervical, anterior, posterior, submandibular, submental, and supraclavicular lymph nodes, thyroid normal.     Assessment and Plan:  1. Left knee pain  This is almost certainly mild osteoarthritis.  I suggested she try taking Tylenol prior to walking to help prevent the pain. If Tylenol does not help, I suggested she try applying ketoprofen 10% cream 3 times a day as needed cream.      2. Age related osteoporosis, unspecified pathological fracture presence  She is due for a DEXA scan and is agreeable to having one done.   - Dexa " hip/pelvis/spine*    3. Hypothyroidism, unspecified type  Routine labs ordered. Refill provided.   - TSH with free T4 reflex  - levothyroxine (SYNTHROID/LEVOTHROID) 50 MCG tablet  Dispense: 90 tablet; Refill: 3    4. HTN (hypertension)  Blood pressure at home is typically 130/76. Refill provided.   - lisinopril (PRINIVIL/ZESTRIL) 20 MG tablet  Dispense: 90 tablet; Refill: 3  - Basic metabolic panel         Follow-up: Return in about 1 year (around 7/2/2020) for Physical Exam.        Scribe Disclosure:  I, Nubia Prakash, am serving as a scribe to document services personally performed by Ana María Rincon MD at this visit, based upon the provider's statements to me. All documentation has been reviewed by the aforementioned provider prior to being entered into the official medical record.     Portions of this medical record were completed by a scribe. UPON MY REVIEW AND AUTHENTICATION BY ELECTRONIC SIGNATURE, this confirms (a) I performed the applicable clinical services, and (b) the record is accurate.       Ana María Rincon    Answers for HPI/ROS submitted by the patient on 7/2/2019   PHQ9 TOTAL SCORE: 1  General Symptoms: No  Skin Symptoms: No  HENT Symptoms: No  EYE SYMPTOMS: No  HEART SYMPTOMS: Yes  LUNG SYMPTOMS: No  INTESTINAL SYMPTOMS: No  URINARY SYMPTOMS: No  GYNECOLOGIC SYMPTOMS: No  BREAST SYMPTOMS: No  SKELETAL SYMPTOMS: Yes  BLOOD SYMPTOMS: No  NERVOUS SYSTEM SYMPTOMS: No  MENTAL HEALTH SYMPTOMS: No  Chest pain or pressure: No  Fast or irregular heartbeat: No  Pain in legs with walking: Yes  Trouble breathing while lying down: No  Fingers or toes appear blue: No  High blood pressure: No  Low blood pressure: No  Fainting: No  Murmurs: No  Pacemaker: No  Varicose veins: Yes  Edema or swelling: No  Wake up at night with shortness of breath: No  Light-headedness: No  Exercise intolerance: No  Back pain: Yes  Muscle aches: Yes  Neck pain: No  Swollen joints: No  Joint pain: Yes  Bone pain: No  Muscle  cramps: Yes  Muscle weakness: No  Joint stiffness: No  Bone fracture: No

## 2019-07-02 NOTE — PATIENT INSTRUCTIONS
Primary Care Center Medication Refill Request Information:  * Please contact your pharmacy regarding ANY request for medication refills.  ** [facility] Prescription Fax = [phone]  * Please allow 3 business days for routine medication refills.  * Please allow 5 business days for controlled substance medication refills.     Primary Care Center Test Result notification information:  *You will be notified with in 7-10 days of your appointment day regarding the results of your test.  If you are on MyChart you will be notified as soon as the provider has reviewed the results and signed off on them.    [facility]: [phone] [address]    [facility] [phone] ([facility], 4th Floor N)     Imaging [phone] [facility]  [phone] [facility]  [phone] [facility]    DEXA Screening Tool    Dexa Scan  Is the patient taking any calcium supplements? yes, if yes patient must stop calcium supplements 24 hours prior to appointment.    Please go to the lab on the first floor before you leave today.

## 2019-07-09 ENCOUNTER — ANCILLARY PROCEDURE (OUTPATIENT)
Dept: BONE DENSITY | Facility: CLINIC | Age: 79
End: 2019-07-09
Attending: INTERNAL MEDICINE
Payer: MEDICARE

## 2019-07-09 DIAGNOSIS — M81.0 AGE RELATED OSTEOPOROSIS, UNSPECIFIED PATHOLOGICAL FRACTURE PRESENCE: ICD-10-CM

## 2019-07-29 DIAGNOSIS — M25.552 LEFT HIP PAIN: Primary | ICD-10-CM

## 2019-07-30 ENCOUNTER — OFFICE VISIT (OUTPATIENT)
Dept: ORTHOPEDICS | Facility: CLINIC | Age: 79
End: 2019-07-30
Payer: MEDICARE

## 2019-07-30 ENCOUNTER — ANCILLARY PROCEDURE (OUTPATIENT)
Dept: GENERAL RADIOLOGY | Facility: CLINIC | Age: 79
End: 2019-07-30
Attending: ORTHOPAEDIC SURGERY
Payer: MEDICARE

## 2019-07-30 VITALS — OXYGEN SATURATION: 99 % | HEART RATE: 56 BPM | SYSTOLIC BLOOD PRESSURE: 155 MMHG | DIASTOLIC BLOOD PRESSURE: 73 MMHG

## 2019-07-30 DIAGNOSIS — M70.62 GREATER TROCHANTERIC BURSITIS OF LEFT HIP: Primary | ICD-10-CM

## 2019-07-30 DIAGNOSIS — M25.552 LEFT HIP PAIN: ICD-10-CM

## 2019-07-30 PROCEDURE — 73522 X-RAY EXAM HIPS BI 3-4 VIEWS: CPT | Performed by: RADIOLOGY

## 2019-07-30 PROCEDURE — 99213 OFFICE O/P EST LOW 20 MIN: CPT | Performed by: ORTHOPAEDIC SURGERY

## 2019-07-30 NOTE — PROGRESS NOTES
"Chief Complaint: Pain and Follow Up of the Left Hip (Hip pain, Last seen Dr. Williamson at [facility] 09/2017)       HPI: Marli Ponce returns today in follow-up for her hip. We have been treating her lateral hip pain with physical therapy. She reports that her physical therapist did not in fact work on the hip and instead Rx exercises for her low back. Mrs Ponce reports that this was in fact quite beneficial for her but that it did not improve the hip. Hip symptoms remain lateral and worse with stairs and hiking hills. Has not had and does not want injection tx.    Medications and allergies are documented in the EMR and have been reviewed.    Current Outpatient Medications:      BIOTIN FORTE PO, Take by mouth daily, Disp: , Rfl:      calcium 600-200 MG-UNIT TABS, Take 1 tablet by mouth 2 times daily, Disp: 180 tablet, Rfl: 3     Calcium Carbonate-Vitamin D (CALCIUM + D PO), Take 2 tablets by mouth daily., Disp: , Rfl:      Ketoprofen 10 % CREA, 1 Application 3 times daily as needed Use 1 pea sized applicate to the left knee three times daily as needed for pain, Disp: 1 Bottle, Rfl: 1     levothyroxine (SYNTHROID/LEVOTHROID) 50 MCG tablet, Take 1 tablet (50 mcg) by mouth daily, Disp: 90 tablet, Rfl: 3     lisinopril (PRINIVIL/ZESTRIL) 20 MG tablet, Take 1 tablet (20 mg) by mouth daily, Disp: 90 tablet, Rfl: 3     Multiple Vitamin (MULTIVITAMIN) per tablet, Take 1 tablet by mouth daily., Disp: , Rfl:      Nutritional Supplements (ADULT NUTRITIONAL SUPPLEMENT OR), Taking \"Estrovera\", Disp: , Rfl:      omeprazole (PRILOSEC) 20 MG DR capsule, TAKE 1 CAPSULE(20 MG) BY MOUTH DAILY, Disp: 90 capsule, Rfl: 0     Polyvinyl Alcohol-Povidone (REFRESH OP), Apply to eye as needed, Disp: , Rfl:   Allergies: Cosopt [dorzolamide-timolol]; Lumigan; and Xalatan [latanoprost]    Physical Exam:  On physical examination the patient appears the stated age, is in no acute distress, affect is appropriate, and breathing is non-labored.  BP (!) " 155/73 (BP Location: Left arm, Patient Position: Sitting, Cuff Size: Adult Regular)   Pulse 56   SpO2 99%   There is no height or weight on file to calculate BMI.  Gait: normal  ROM:fluid and painless  TTP at the left GT and this reproduces her symptoms.   Distally, the circulatory, motor, and sensation exam is intact with 5/5 EHL, gastroc-soleus, and tibialis anterior.  Sensation to light touch is intact.  Dorsalis pedis and posterior tibialis pulses are palpable.  There are no sores on the feet, no bruising, and no lymphedema.    Assessment: left greater troch pain. We discussed the treatment options including injection and physical therapy. She is not interested injections.   Plan: PT for greater troch program.     No ref. provider found

## 2019-07-30 NOTE — LETTER
"    7/30/2019         RE: Marli Ponce  [address] [address]        Dear Colleague,    Thank you for referring your patient, Marli Ponce, to the [facility]. Please see a copy of my visit note below.    Chief Complaint: Pain and Follow Up of the Left Hip (Hip pain, Last seen Dr. Williamson at [facility] 09/2017)       HPI: Marli Ponce returns today in follow-up for her hip. We have been treating her lateral hip pain with physical therapy. She reports that her physical therapist did not in fact work on the hip and instead Rx exercises for her low back. Mrs Ponce reports that this was in fact quite beneficial for her but that it did not improve the hip. Hip symptoms remain lateral and worse with stairs and hiking hills. Has not had and does not want injection tx.    Medications and allergies are documented in the EMR and have been reviewed.    Current Outpatient Medications:      BIOTIN FORTE PO, Take by mouth daily, Disp: , Rfl:      calcium 600-200 MG-UNIT TABS, Take 1 tablet by mouth 2 times daily, Disp: 180 tablet, Rfl: 3     Calcium Carbonate-Vitamin D (CALCIUM + D PO), Take 2 tablets by mouth daily., Disp: , Rfl:      Ketoprofen 10 % CREA, 1 Application 3 times daily as needed Use 1 pea sized applicate to the left knee three times daily as needed for pain, Disp: 1 Bottle, Rfl: 1     levothyroxine (SYNTHROID/LEVOTHROID) 50 MCG tablet, Take 1 tablet (50 mcg) by mouth daily, Disp: 90 tablet, Rfl: 3     lisinopril (PRINIVIL/ZESTRIL) 20 MG tablet, Take 1 tablet (20 mg) by mouth daily, Disp: 90 tablet, Rfl: 3     Multiple Vitamin (MULTIVITAMIN) per tablet, Take 1 tablet by mouth daily., Disp: , Rfl:      Nutritional Supplements (ADULT NUTRITIONAL SUPPLEMENT OR), Taking \"Estrovera\", Disp: , Rfl:      omeprazole (PRILOSEC) 20 MG DR capsule, TAKE 1 CAPSULE(20 MG) BY MOUTH DAILY, Disp: 90 capsule, Rfl: 0     Polyvinyl Alcohol-Povidone (REFRESH OP), Apply to eye as needed, Disp: , Rfl: "   Allergies: Cosopt [dorzolamide-timolol]; Lumigan; and Xalatan [latanoprost]    Physical Exam:  On physical examination the patient appears the stated age, is in no acute distress, affect is appropriate, and breathing is non-labored.  BP (!) 155/73 (BP Location: Left arm, Patient Position: Sitting, Cuff Size: Adult Regular)   Pulse 56   SpO2 99%   There is no height or weight on file to calculate BMI.  Gait: normal  ROM:fluid and painless  TTP at the left GT and this reproduces her symptoms.   Distally, the circulatory, motor, and sensation exam is intact with 5/5 EHL, gastroc-soleus, and tibialis anterior.  Sensation to light touch is intact.  Dorsalis pedis and posterior tibialis pulses are palpable.  There are no sores on the feet, no bruising, and no lymphedema.    Assessment: left greater troch pain. We discussed the treatment options including injection and physical therapy. She is not interested injections.   Plan: PT for greater troch program.     No ref. provider found      Again, thank you for allowing me to participate in the care of your patient.        Sincerely,        Negro Williamson MD

## 2019-08-08 ENCOUNTER — THERAPY VISIT (OUTPATIENT)
Dept: PHYSICAL THERAPY | Facility: CLINIC | Age: 79
End: 2019-08-08
Attending: ORTHOPAEDIC SURGERY
Payer: MEDICARE

## 2019-08-08 DIAGNOSIS — M70.62 GREATER TROCHANTERIC BURSITIS OF LEFT HIP: ICD-10-CM

## 2019-08-08 PROCEDURE — 97110 THERAPEUTIC EXERCISES: CPT | Mod: GP | Performed by: PHYSICAL THERAPIST

## 2019-08-08 PROCEDURE — 97161 PT EVAL LOW COMPLEX 20 MIN: CPT | Mod: GP | Performed by: PHYSICAL THERAPIST

## 2019-08-08 ASSESSMENT — ACTIVITIES OF DAILY LIVING (ADL)
GETTING_INTO_AND_OUT_OF_A_BATHTUB: NO DIFFICULTY AT ALL
WALKING_15_MINUTES_OR_GREATER: NO DIFFICULTY AT ALL
WALKING_UP_STEEP_HILLS: MODERATE DIFFICULTY
WALKING_APPROXIMATELY_10_MINUTES: NO DIFFICULTY AT ALL
STANDING_FOR_15_MINUTES: SLIGHT DIFFICULTY
GOING_UP_1_FLIGHT_OF_STAIRS: MODERATE DIFFICULTY
HOS_ADL_COUNT: 17
HOW_WOULD_YOU_RATE_YOUR_CURRENT_LEVEL_OF_FUNCTION_DURING_YOUR_USUAL_ACTIVITIES_OF_DAILY_LIVING_FROM_0_TO_100_WITH_100_BEING_YOUR_LEVEL_OF_FUNCTION_PRIOR_TO_YOUR_HIP_PROBLEM_AND_0_BEING_THE_INABILITY_TO_PERFORM_ANY_OF_YOUR_USUAL_DAILY_ACTIVITIES?: 100
TWISTING/PIVOTING_ON_INVOLVED_LEG: EXTREME DIFFICULTY
HOS_ADL_ITEM_SCORE_TOTAL: 49
GETTING_INTO_AND_OUT_OF_AN_AVERAGE_CAR: MODERATE DIFFICULTY
PUTTING_ON_SOCKS_AND_SHOES: EXTREME DIFFICULTY
GOING_DOWN_1_FLIGHT_OF_STAIRS: MODERATE DIFFICULTY
LIGHT_TO_MODERATE_WORK: MODERATE DIFFICULTY
RECREATIONAL_ACTIVITIES: SLIGHT DIFFICULTY
WALKING_INITIALLY: NO DIFFICULTY AT ALL
ROLLING_OVER_IN_BED: MODERATE DIFFICULTY
HOS_ADL_SCORE(%): 72.06
WALKING_DOWN_STEEP_HILLS: NO DIFFICULTY AT ALL
STEPPING_UP_AND_DOWN_CURBS: NO DIFFICULTY AT ALL
HEAVY_WORK: SLIGHT DIFFICULTY
HOS_ADL_HIGHEST_POTENTIAL_SCORE: 68
SITTING_FOR_15_MINUTES: MODERATE DIFFICULTY
DEEP_SQUATTING: SLIGHT DIFFICULTY

## 2019-08-08 NOTE — PROGRESS NOTES
[facility] Initial Evaluation  Subjective:  The history is provided by the patient. No  was used.   Marli Ponce being seen for Left hip pain.   Problem began 7/15/2019 (10/10 sharp pain when arise after sitting 15 min). Where condition occurred: for unknown reasons.Problem occurred: increaese walking 2.5 mile on trails  and reported as 7/10 on pain scale. General health as reported by patient is good. Pertinent medical history includes:  Thyroid problems.  Medical allergies: none.  Surgeries include:  None.  Current medications:  High blood pressure medication and thyroid medication.     Pain is described as sharp and is intermittent. Pain is the same all the time. Since onset symptoms are gradually improving. Special tests:  X-ray. Past treatment: none.    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    Barriers include:  Bathroom/bedroom on second floor.  Red flags:  None as reported by patient.                      Objective:        Flexibility/Screens:   Positive screens:  Lumbar    Lower Extremity:  Decreased left lower extremity flexibility:Hip IR's; Hip Flexors; Quadriceps and Hamstrings    Decreased right lower extremity flexibility:  Hip Flexors; Quadriceps and Hamstrings                                                 Hip Evaluation  HIP AROM:  : flex 110, abd 30, ER 30, IR15+ : flex 115, abd 30, ER 30, IR 20.                    Hip Strength:  : hip flex, abd, add 4/5 others 5-/5.                          Hip Special Testing:   Special tests hip not assessed: trunk ROM, mod loss flex + min loss sdie glides and ext.  Left hip positive for the following special tests:  Froylan; SLR and Femoral Nerve      Hip Palpation:    Left hip tenderness present at:   Greater Trachanter; IT Band; Piriformis; Abductors and Gluteus Medius               General     ROS    Assessment/Plan:    Patient is a 79 year old female with left side hip complaints.    Patient has the  following significant findings with corresponding treatment plan.                Diagnosis 1:  Left hip pain with possible lumbar involvement  Pain -  US, manual therapy, self management, education and home program  Decreased ROM/flexibility - manual therapy and therapeutic exercise  Decreased strength - therapeutic exercise and therapeutic activities  Decreased function - therapeutic activities    Therapy Evaluation Codes:   1) History comprised of:   Personal factors that impact the plan of care:      None.    Comorbidity factors that impact the plan of care are:      None.     Medications impacting care: None.  2) Examination of Body Systems comprised of:   Body structures and functions that impact the plan of care:      Hip.   Activity limitations that impact the plan of care are:      Bending, Dressing, Sitting, Stairs, Standing and Walking.  3) Clinical presentation characteristics are:   Stable/Uncomplicated.  4) Decision-Making    Low complexity using standardized patient assessment instrument and/or measureable assessment of functional outcome.  Cumulative Therapy Evaluation is: Low complexity.    Previous and current functional limitations:  (See Goal Flow Sheet for this information)    Short term and Long term goals: (See Goal Flow Sheet for this information)     Communication ability:  Patient appears to be able to clearly communicate and understand verbal and written communication and follow directions correctly.  Treatment Explanation - The following has been discussed with the patient:   RX ordered/plan of care  Anticipated outcomes  Possible risks and side effects  This patient would benefit from PT intervention to resume normal activities.   Rehab potential is excellent.    Frequency:  1 X week, once daily  Duration:  for 6 weeks  Discharge Plan:  Achieve all LTG.  Independent in home treatment program.  Reach maximal therapeutic benefit.    Please refer to the daily flowsheet for treatment today,  total treatment time and time spent performing 1:1 timed codes.

## 2019-08-08 NOTE — LETTER
DEPARTMENT OF HEALTH AND HUMAN SERVICES  CENTERS FOR MEDICARE & MEDICAID SERVICES    PLAN/UPDATED PLAN OF PROGRESS FOR OUTPATIENT REHABILITATION    PATIENTS NAME:  Marli Ponce     : 1940    PROVIDER NUMBER:    [id number]    HICN:  [id number]    PROVIDER NAME: [facility] PHYSICAL THERAPY    MEDICAL RECORD NUMBER: [id number]     START OF CARE DATE:  SOC Date: 19   TYPE:  PT    PRIMARY/TREATMENT DIAGNOSIS: (Pertinent Medical Diagnosis)  Greater trochanteric bursitis of left hip    VISITS FROM START OF CARE:  Rxs Used: 1     [facility] Initial Evaluation    Subjective:  The history is provided by the patient. No  was used.   Marli Ponce being seen for Left hip pain.     Problem began 7/15/2019 (10/10 sharp pain when arise after sitting 15 min). Where condition occurred: for unknown reasons.Problem occurred: increaese walking 2.5 mile on trails  and reported as 7/10 on pain scale. General health as reported by patient is good.     Pertinent medical history includes:  Thyroid problems.  Medical allergies: none.  Surgeries include:  None.  Current medications:  High blood pressure medication and thyroid medication.         Pain is described as sharp and is intermittent. Pain is the same all the time. Since onset symptoms are gradually improving. Special tests:  X-ray. Past treatment: none.    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    Barriers include:  Bathroom/bedroom on second floor.  Red flags:  None as reported by patient.                Objective:  Flexibility/Screens:   Positive screens:  Lumbar    Lower Extremity:  Decreased left lower extremity flexibility:Hip IR's; Hip Flexors; Quadriceps and Hamstrings    Decreased right lower extremity flexibility:  Hip Flexors; Quadriceps and Hamstrings  Hip Evaluation  HIP AROM:  : flex 110, abd 30, ER 30, IR15+ : flex 115, abd 30, ER 30, IR 20.    Hip Strength:  : hip flex, abd,  add 4/5 others 5-/5.    Hip Special Testing:   Special tests hip not assessed: trunk ROM, mod loss flex + min loss sdie glides and ext.  Left hip positive for the following special tests:  Froylan; SLR and Femoral Nerve      Hip Palpation:    Left hip tenderness present at:   Greater Trachanter; IT Band; Piriformis; Abductors and Gluteus Medius    Assessment/Plan:    Patient is a 79 year old female with left side hip complaints.    Patient has the following significant findings with corresponding treatment plan.                  Diagnosis 1:  Left hip pain with possible lumbar involvement  Pain -  US, manual therapy, self management, education and home program  Decreased ROM/flexibility - manual therapy and therapeutic exercise  Decreased strength - therapeutic exercise and therapeutic activities  Decreased function - therapeutic activities    Therapy Evaluation Codes:   1) History comprised of:   Personal factors that impact the plan of care:      None.    Comorbidity factors that impact the plan of care are:      None.     Medications impacting care: None.  2) Examination of Body Systems comprised of:   Body structures and functions that impact the plan of care:      Hip.   Activity limitations that impact the plan of care are:      Bending, Dressing, Sitting, Stairs, Standing and Walking.  3) Clinical presentation characteristics are:   Stable/Uncomplicated.  4) Decision-Making    Low complexity using standardized patient assessment instrument and/or measureable assessment of functional outcome.  Cumulative Therapy Evaluation is: Low complexity.    Previous and current functional limitations:  (See Goal Flow Sheet for this information)    Short term and Long term goals: (See Goal Flow Sheet for this information)     Communication ability:  Patient appears to be able to clearly communicate and understand verbal and written communication and follow directions correctly.    Treatment Explanation - The following has  "been discussed with the patient:   RX ordered/plan of care  Anticipated outcomes  Possible risks and side effects      This patient would benefit from PT intervention to resume normal activities.   Rehab potential is excellent.  Frequency:  1 X week, once daily  Duration:  for 6 weeks  Discharge Plan:  Achieve all LTG.  Independent in home treatment program.  Reach maximal therapeutic benefit.    Please refer to the daily flowsheet for treatment today, total treatment time and time spent performing 1:1 timed codes.     Caregiver Signature/Credentials _____________________________ Date ________       Treating Provider: Dali Rooney PT     I have reviewed and certified the need for these services and plan of treatment while under my care.        PHYSICIAN'S SIGNATURE:   _____________________________________  Date___________            Negro Williamson MD    Certification period:  Beginning of Cert date period: 08/08/19 to  End of Cert period date: 10/07/19     Functional Level Progress Report: Please see attached \"Goal Flow sheet for Functional level.\"    ____X____ Continue Services or       ________ DC Services                Service dates: From  SOC Date: 08/08/19 date to present                         "

## 2019-08-13 ENCOUNTER — THERAPY VISIT (OUTPATIENT)
Dept: PHYSICAL THERAPY | Facility: CLINIC | Age: 79
End: 2019-08-13
Payer: MEDICARE

## 2019-08-13 DIAGNOSIS — M25.552 HIP PAIN, LEFT: ICD-10-CM

## 2019-08-13 PROCEDURE — 97140 MANUAL THERAPY 1/> REGIONS: CPT | Mod: GP | Performed by: PHYSICAL THERAPIST

## 2019-08-13 PROCEDURE — 97110 THERAPEUTIC EXERCISES: CPT | Mod: GP | Performed by: PHYSICAL THERAPIST

## 2019-08-23 ENCOUNTER — THERAPY VISIT (OUTPATIENT)
Dept: PHYSICAL THERAPY | Facility: CLINIC | Age: 79
End: 2019-08-23
Payer: MEDICARE

## 2019-08-23 DIAGNOSIS — M25.552 HIP PAIN, LEFT: ICD-10-CM

## 2019-08-23 PROCEDURE — 97140 MANUAL THERAPY 1/> REGIONS: CPT | Mod: GP | Performed by: PHYSICAL THERAPIST

## 2019-08-23 PROCEDURE — 97110 THERAPEUTIC EXERCISES: CPT | Mod: GP | Performed by: PHYSICAL THERAPIST

## 2019-08-23 ASSESSMENT — ACTIVITIES OF DAILY LIVING (ADL)
HOS_ADL_SCORE(%): 100
DEEP_SQUATTING: NO DIFFICULTY AT ALL
WALKING_UP_STEEP_HILLS: NO DIFFICULTY AT ALL
ROLLING_OVER_IN_BED: NO DIFFICULTY AT ALL
WALKING_DOWN_STEEP_HILLS: NO DIFFICULTY AT ALL
STANDING_FOR_15_MINUTES: NO DIFFICULTY AT ALL
WALKING_APPROXIMATELY_10_MINUTES: NO DIFFICULTY AT ALL
STEPPING_UP_AND_DOWN_CURBS: NO DIFFICULTY AT ALL
HEAVY_WORK: NO DIFFICULTY AT ALL
GETTING_INTO_AND_OUT_OF_A_BATHTUB: NO DIFFICULTY AT ALL
LIGHT_TO_MODERATE_WORK: NO DIFFICULTY AT ALL
WALKING_15_MINUTES_OR_GREATER: NO DIFFICULTY AT ALL
RECREATIONAL_ACTIVITIES: NO DIFFICULTY AT ALL
SITTING_FOR_15_MINUTES: NO DIFFICULTY AT ALL
HOW_WOULD_YOU_RATE_YOUR_CURRENT_LEVEL_OF_FUNCTION_DURING_YOUR_USUAL_ACTIVITIES_OF_DAILY_LIVING_FROM_0_TO_100_WITH_100_BEING_YOUR_LEVEL_OF_FUNCTION_PRIOR_TO_YOUR_HIP_PROBLEM_AND_0_BEING_THE_INABILITY_TO_PERFORM_ANY_OF_YOUR_USUAL_DAILY_ACTIVITIES?: 100
GOING_DOWN_1_FLIGHT_OF_STAIRS: NO DIFFICULTY AT ALL
WALKING_INITIALLY: NO DIFFICULTY AT ALL
HOS_ADL_COUNT: 17
PUTTING_ON_SOCKS_AND_SHOES: NO DIFFICULTY AT ALL
GETTING_INTO_AND_OUT_OF_AN_AVERAGE_CAR: NO DIFFICULTY AT ALL
HOS_ADL_HIGHEST_POTENTIAL_SCORE: 68
HOS_ADL_ITEM_SCORE_TOTAL: 68
TWISTING/PIVOTING_ON_INVOLVED_LEG: NO DIFFICULTY AT ALL
GOING_UP_1_FLIGHT_OF_STAIRS: NO DIFFICULTY AT ALL

## 2019-08-23 NOTE — PROGRESS NOTES
Subjective:  HPI                    Objective:  System    Physical Exam    General     ROS    Assessment/Plan:    DISCHARGE REPORT    Progress reporting period is from 8/13/19 to 8/23/19.       SUBJECTIVE  Patient reports she feels better and better. Patient reports easier getting her leg in and out of the car.     Current Pain level: 0/10.     Initial Pain level: 10/10(sharp papin when arise from sitting or lying).   Changes in function:  Yes (See Goal flowsheet attached for changes in current functional level)  Adverse reaction to treatment or activity: None    OBJECTIVE   AROM hip flexion 120 deg bilat, pain with palpation ASIS and just inferior, Hip IR 30 deg bilaterally no pain, Hip ER 57 deg bilaerally no pain. hip flexion 5/5 with no pain, Hip IR/ ER 5/5 no pain, lumbar flexion hands to toes no pain pain with repeated flexion either.      ASSESSMENT/PLAN  Updated problem list and treatment plan: Diagnosis 1:  left hip flexor strain   STG/LTGs have been met or progress has been made towards goals:  Yes (See Goal flow sheet completed today.)  Assessment of Progress: The patient has met all of their long term goals.  Self Management Plans:  Patient is independent in a home treatment program.  I have re-evaluated this patient and find that the nature, scope, duration and intensity of the therapy is appropriate for the medical condition of the patient.  Marli continues to require the following intervention to meet STG and LTG's:  PT intervention is no longer required to meet STG/LTG.    Recommendations:  This patient is ready to be discharged from therapy and continue their home treatment program.    Please refer to the daily flowsheet for treatment today, total treatment time and time spent performing 1:1 timed codes.

## 2019-09-19 ENCOUNTER — OFFICE VISIT (OUTPATIENT)
Dept: OBGYN | Facility: CLINIC | Age: 79
End: 2019-09-19
Attending: OBSTETRICS & GYNECOLOGY
Payer: MEDICARE

## 2019-09-19 VITALS
DIASTOLIC BLOOD PRESSURE: 78 MMHG | HEART RATE: 60 BPM | SYSTOLIC BLOOD PRESSURE: 163 MMHG | BODY MASS INDEX: 24.19 KG/M2 | HEIGHT: 61 IN | WEIGHT: 128.1 LBS

## 2019-09-19 DIAGNOSIS — Z01.419 ENCOUNTER FOR GYNECOLOGICAL EXAMINATION WITHOUT ABNORMAL FINDING: ICD-10-CM

## 2019-09-19 ASSESSMENT — MIFFLIN-ST. JEOR: SCORE: 980.74

## 2019-09-19 NOTE — LETTER
2019       RE: Marli Ponce  [address] [facility] [address]     Dear Colleague,    Thank you for referring your patient, Marli Ponce, to the [facility] at [facility]. Please see a copy of my visit note below.    Progress Note    SUBJECTIVE:  Marli Ponce is a pleasant 79 year old  , who requests a breast and pelvic exam. She is doing well. She mentions she has been in PT for left hip pain, which is much improved at this point. Otherwise, she has no active concerns about her health. ROS negative for pelvic pain, vaginal dryness, vaginal bleeding/discharge, urinary changes/infections.     Patient formerly followed by Dr. Ana María Rincon. Primary care is in the process of being transferred due to her intermediate.      Concerns today include: none    Menstrual History:  Menstrual History 2013   Reviewed Today Yes   Comments Menopause     Last    Lab Results   Component Value Date    PAP NIL 2012     History of abnormal Pap smear: no, not indicated     Last No results found for: HPV16  Last No results found for: HPV18  Last No results found for: HRHPV    Mammogram current: not applicable    HISTORY:  Prescription Medications as of 2019       Rx Number Disp Refills Start End Last Dispensed Date Next Fill Date Owning Pharmacy    BIOTIN FORTE PO            Sig: Take by mouth daily    Class: Historical    Route: Oral    calcium 600-200 MG-UNIT TABS  180 tablet 3 2017        Sig: Take 1 tablet by mouth 2 times daily    Class: Historical    Route: Oral    Calcium Carbonate-Vitamin D (CALCIUM + D PO)            Sig: Take 2 tablets by mouth daily.    Class: Historical    Route: Oral    Ketoprofen 10 % CREA  1 Bottle 1 2019    [id number] - [facility] [address] [facility] [address]    Si Application 3 times daily as needed Use 1 pea sized applicate to the left knee three times  "daily as needed for pain    Class: E-Prescribe    Route: Does not apply    levothyroxine (SYNTHROID/LEVOTHROID) 50 MCG tablet  90 tablet 3 2019    [facility] [id number] [place] [address] - [address] AT [place] [address]    Sig: Take 1 tablet (50 mcg) by mouth daily    Class: E-Prescribe    Route: Oral    lisinopril (PRINIVIL/ZESTRIL) 20 MG tablet  90 tablet 3 2019    [facility] #[id number] [place], [place] - [address] AT [place] [address]    Sig: Take 1 tablet (20 mg) by mouth daily    Class: E-Prescribe    Route: Oral    Multiple Vitamin (MULTIVITAMIN) per tablet            Sig: Take 1 tablet by mouth daily.    Class: Historical    Route: Oral    Nutritional Supplements (ADULT NUTRITIONAL SUPPLEMENT OR)            Sig: Taking \"Estrovera\"    Class: Historical    Route: Oral    omeprazole (PRILOSEC) 20 MG DR capsule  90 capsule 0 6/10/2019    [facility] #[id number] [place], [place] - [address] AT [place] [address]    Sig: TAKE 1 CAPSULE(20 MG) BY MOUTH DAILY    Class: E-Prescribe    Polyvinyl Alcohol-Povidone (REFRESH OP)            Sig: Apply to eye as needed    Class: Historical    Route: Ophthalmic        Allergies   Allergen Reactions     Cosopt [Dorzolamide-Timolol]      No effect       Lumigan      FBS, REDNESS     Xalatan [Latanoprost]      No effect       Immunization History   Administered Date(s) Administered     HEPA 2005     Influenza (IIV3) PF 2008, 10/27/2011, 2012, 10/06/2013, 10/05/2014, 2017     Meningococcal (Menomune ) 2005     Pneumo Conj 13-V (2010&after) 2015     Pneumococcal 23 valent 2003, 2014     Poliovirus, inactivated (IPV) 2005     TD (ADULT, 7+) 2004     TDAP Vaccine (Boostrix) 2013     Tdap (Adacel,Boostrix) 2013     Typhoid IM 2005     Yellow Fever 2005     Zoster vaccine, live 2013     OB History    Para Term  AB " Living   7 0 0 0 0 5   SAB TAB Ectopic Multiple Live Births   0 0 0 0 0     Past Medical History:   Diagnosis Date     Cataract      Hyperlipidemia LDL goal < 130      Hypertension     white coat; home blood pressure monitoring 2016  average systolic blood pressure approximately 115.     Hypothyroidism      Low bone density 2017    FRAX tenure probability of fracture, major osteoporotic: 14.6%; hip fracture 4% (increased) Plan:  Alendronate 70 mg weekly; trial basis to assess for reflux esophagitis     Ocular hypertension      Primary open-angle glaucoma     adv     Past Surgical History:   Procedure Laterality Date     CHOLECYSTECTOMY       LAPAROSCOPIC APPENDECTOMY       LASER IRIDOTOMY OD (RIGHT EYE)      RE/LE  pre      LASER IRIDOTOMY OS (LEFT EYE)  2010     SELECTIVE LASER TRABECULOPLASTY (SLT) OS (LEFT EYE)  2010    LE     TRABECULECTOMY, MITOMYCIN FILTER, COMBINED  1/10/2012    LE     Family History   Problem Relation Age of Onset     Cancer Mother 78        stomach and  at 80     Hypertension Mother      Glaucoma Mother      Breast Cancer Sister 38        still living     Hypertension Father      Cerebrovascular Disease Father 52     Glaucoma Maternal Grandfather      Social History     Socioeconomic History     Marital status:      Spouse name: None     Number of children: None     Years of education: None     Highest education level: None   Occupational History     None   Social Needs     Financial resource strain: None     Food insecurity:     Worry: None     Inability: None     Transportation needs:     Medical: None     Non-medical: None   Tobacco Use     Smoking status: Never Smoker     Smokeless tobacco: Never Used   Substance and Sexual Activity     Alcohol use: Yes     Alcohol/week: 0.0 oz     Comment: rare tequilla     Drug use: No     Sexual activity: Yes     Partners: Male     Comment: 1962   Lifestyle     Physical activity:     Days per week: None      "Minutes per session: None     Stress: None   Relationships     Social connections:     Talks on phone: None     Gets together: None     Attends Worship service: None     Active member of club or organization: None     Attends meetings of clubs or organizations: None     Relationship status: None     Intimate partner violence:     Fear of current or ex partner: None     Emotionally abused: None     Physically abused: None     Forced sexual activity: None   Other Topics Concern     None   Social History Narrative    Homemaker has 5 children, 7 grand, 3 greatgrandHow much exercise per week? DailyHow much calcium per day? Supplement   How much caffeine per day? NoHow much vitamin D per day? SupplementDo you/your family wear seatbelts?  YesDo you/your family use safety helmets? NoDo you/your family use sunscreen? YesDo you/your family keep firearms in the home? YesDo you/your family have a smoke detector(s)? YesDo you feel safe in your home? YesHas anyone ever touched you in an unwanted manner? No Explain         How much exercise per week? 5     How much calcium per day? daily       How much caffeine per day? none    How much vitamin D per day? Supplement    Do you/your family wear seatbelts?  Yes    Do you/your family use safety helmets? No    Do you/your family use sunscreen? Yes    Do you/your family keep firearms in the home? Yes    Do you/your family have a smoke detector(s)? Yes        Do you feel safe in your home? Yes    Has anyone ever touched you in an unwanted manner? No     Explain     July 16, 2015 Lauren León LPN    Reviewed Gema DOLAN MA 8/1/2017             ROS: 10 point ROS neg other than the symptoms noted above.     EXAM:  Blood pressure (!) 163/78, pulse 60, height 1.545 m (5' 0.83\"), weight 58.1 kg (128 lb 1.6 oz), not currently breastfeeding. Body mass index is 24.34 kg/m .  General appearance: Pleasant female in no acute distress.     BREAST EXAM:  Breast: Without visible skin changes. No dimpling " or lesions seen. Breasts supple, non-tender with palpation, no dominant mass, nodularity, or nipple discharge noted bilaterally. Axillary nodes negative.      PELVIC EXAM:  EG/BUS: Normal genital architecture without lesions, erythema or abnormal secretions Bartholin's, Urethra, [place]'s normal   Urethral meatus: normal   Urethra: no masses, tenderness, or scarring   Bladder: no masses or tenderness   Vagina: atrophic with scant physiologic appearing discharge  Cervix: Multiparous, normal, no lesions  Uterus: small, smooth, firm, mobile w/o pain  Adnexa: Within normal limits and No masses, nodularity, tenderness  Rectum:anus normal     ASSESSMENT:  Encounter Diagnosis   Name Primary?     Encounter for gynecological examination without abnormal finding       Marli Ponce is a pleasant 79 year old  , who requests a breast and pelvic exam. No concerns at this time.     PLAN:   Orders Placed This Encounter   Procedures     Pelvic and Breast Exam Procedure []     Return in one year/PRN for preventive care or problems/concerns.  Verbalized understanding and agreement with visit plan.    Fouzia Kyle, MS3    Patient was seen with student who acted as my scribe and was present for learning. I personally assessed, examined and made medical decisions reflected in the documentation. Any necessary edits to the note have been made by myself.     Cami Hearn MD

## 2019-09-19 NOTE — PROGRESS NOTES
Progress Note    SUBJECTIVE:  Marli Ponce is a pleasant 79 year old  , who requests a breast and pelvic exam. She is doing well. She mentions she has been in PT for left hip pain, which is much improved at this point. Otherwise, she has no active concerns about her health. ROS negative for pelvic pain, vaginal dryness, vaginal bleeding/discharge, urinary changes/infections.     Patient formerly followed by Dr. Ana María Rincon. Primary care is in the process of being transferred due to her nursing home.      Concerns today include: none    Menstrual History:  Menstrual History 2013   Reviewed Today Yes   Comments Menopause       Last    Lab Results   Component Value Date    PAP NIL 2012     History of abnormal Pap smear: no, not indicated     Last No results found for: HPV16  Last No results found for: HPV18  Last No results found for: HRHPV    Mammogram current: not applicable    HISTORY:  Prescription Medications as of 2019       Rx Number Disp Refills Start End Last Dispensed Date Next Fill Date Owning Pharmacy    BIOTIN FORTE PO            Sig: Take by mouth daily    Class: Historical    Route: Oral    calcium 600-200 MG-UNIT TABS  180 tablet 3 2017        Sig: Take 1 tablet by mouth 2 times daily    Class: Historical    Route: Oral    Calcium Carbonate-Vitamin D (CALCIUM + D PO)            Sig: Take 2 tablets by mouth daily.    Class: Historical    Route: Oral    Ketoprofen 10 % CREA  1 Bottle 1 2019    [facility] STORE #[id number] [place], [place] - [address] AT [facility] [address]    Si Application 3 times daily as needed Use 1 pea sized applicate to the left knee three times daily as needed for pain    Class: E-Prescribe    Route: Does not apply    levothyroxine (SYNTHROID/LEVOTHROID) 50 MCG tablet  90 tablet 3 2019    [facility] STORE #[id number] [place], [place] - [address] AT [facility] [address]    Sig: Take 1 tablet (50 mcg) by mouth daily  "   Class: E-Prescribe    Route: Oral    lisinopril (PRINIVIL/ZESTRIL) 20 MG tablet  90 tablet 3 2019    [facility] STORE #[id number] [place], [place] - [address] AT [place] [address]    Sig: Take 1 tablet (20 mg) by mouth daily    Class: E-Prescribe    Route: Oral    Multiple Vitamin (MULTIVITAMIN) per tablet            Sig: Take 1 tablet by mouth daily.    Class: Historical    Route: Oral    Nutritional Supplements (ADULT NUTRITIONAL SUPPLEMENT OR)            Sig: Taking \"Estrovera\"    Class: Historical    Route: Oral    omeprazole (PRILOSEC) 20 MG DR capsule  90 capsule 0 6/10/2019    [facility] STORE #[id number] - [place], [place] - [address] AT [place] [address]    Sig: TAKE 1 CAPSULE(20 MG) BY MOUTH DAILY    Class: E-Prescribe    Polyvinyl Alcohol-Povidone (REFRESH OP)            Sig: Apply to eye as needed    Class: Historical    Route: Ophthalmic        Allergies   Allergen Reactions     Cosopt [Dorzolamide-Timolol]      No effect       Lumigan      FBS, REDNESS     Xalatan [Latanoprost]      No effect       Immunization History   Administered Date(s) Administered     HEPA 2005     Influenza (IIV3) PF 2008, 10/27/2011, 2012, 10/06/2013, 10/05/2014, 2017     Meningococcal (Menomune ) 2005     Pneumo Conj 13-V (2010&after) 2015     Pneumococcal 23 valent 2003, 2014     Poliovirus, inactivated (IPV) 2005     TD (ADULT, 7+) 2004     TDAP Vaccine (Boostrix) 2013     Tdap (Adacel,Boostrix) 2013     Typhoid IM 2005     Yellow Fever 2005     Zoster vaccine, live 2013       OB History    Para Term  AB Living   7 0 0 0 0 5   SAB TAB Ectopic Multiple Live Births   0 0 0 0 0     Past Medical History:   Diagnosis Date     Cataract      Hyperlipidemia LDL goal < 130      Hypertension     white coat; home blood pressure monitoring 2016  average systolic blood pressure approximately " 115.     Hypothyroidism      Low bone density 2017    FRAX tenure probability of fracture, major osteoporotic: 14.6%; hip fracture 4% (increased) Plan:  Alendronate 70 mg weekly; trial basis to assess for reflux esophagitis     Ocular hypertension      Primary open-angle glaucoma     adv     Past Surgical History:   Procedure Laterality Date     CHOLECYSTECTOMY       LAPAROSCOPIC APPENDECTOMY       LASER IRIDOTOMY OD (RIGHT EYE)      RE/LE  pre      LASER IRIDOTOMY OS (LEFT EYE)  2010     SELECTIVE LASER TRABECULOPLASTY (SLT) OS (LEFT EYE)  2010    LE     TRABECULECTOMY, MITOMYCIN FILTER, COMBINED  1/10/2012    LE     Family History   Problem Relation Age of Onset     Cancer Mother 78        stomach and  at 80     Hypertension Mother      Glaucoma Mother      Breast Cancer Sister 38        still living     Hypertension Father      Cerebrovascular Disease Father 52     Glaucoma Maternal Grandfather      Social History     Socioeconomic History     Marital status:      Spouse name: None     Number of children: None     Years of education: None     Highest education level: None   Occupational History     None   Social Needs     Financial resource strain: None     Food insecurity:     Worry: None     Inability: None     Transportation needs:     Medical: None     Non-medical: None   Tobacco Use     Smoking status: Never Smoker     Smokeless tobacco: Never Used   Substance and Sexual Activity     Alcohol use: Yes     Alcohol/week: 0.0 oz     Comment: rare tequilla     Drug use: No     Sexual activity: Yes     Partners: Male     Comment: 1962   Lifestyle     Physical activity:     Days per week: None     Minutes per session: None     Stress: None   Relationships     Social connections:     Talks on phone: None     Gets together: None     Attends [employer] service: None     Active member of club or organization: None     Attends meetings of clubs or organizations: None     Relationship  "status: None     Intimate partner violence:     Fear of current or ex partner: None     Emotionally abused: None     Physically abused: None     Forced sexual activity: None   Other Topics Concern     None   Social History Narrative    Homemaker has 5 children, 7 grand, 3 greatgrandHow much exercise per week? DailyHow much calcium per day? Supplement   How much caffeine per day? NoHow much vitamin D per day? SupplementDo you/your family wear seatbelts?  YesDo you/your family use safety helmets? NoDo you/your family use sunscreen? YesDo you/your family keep firearms in the home? YesDo you/your family have a smoke detector(s)? YesDo you feel safe in your home? YesHas anyone ever touched you in an unwanted manner? No Explain         How much exercise per week? 5     How much calcium per day? daily       How much caffeine per day? none    How much vitamin D per day? Supplement    Do you/your family wear seatbelts?  Yes    Do you/your family use safety helmets? No    Do you/your family use sunscreen? Yes    Do you/your family keep firearms in the home? Yes    Do you/your family have a smoke detector(s)? Yes        Do you feel safe in your home? Yes    Has anyone ever touched you in an unwanted manner? No     Explain     July 16, 2015 Lauren León LPN    Reviewed Gema DOLAN MA 8/1/2017               ROS: 10 point ROS neg other than the symptoms noted above.       EXAM:  Blood pressure (!) 163/78, pulse 60, height 1.545 m (5' 0.83\"), weight 58.1 kg (128 lb 1.6 oz), not currently breastfeeding. Body mass index is 24.34 kg/m .  General appearance: Pleasant female in no acute distress.     BREAST EXAM:  Breast: Without visible skin changes. No dimpling or lesions seen. Breasts supple, non-tender with palpation, no dominant mass, nodularity, or nipple discharge noted bilaterally. Axillary nodes negative.      PELVIC EXAM:  EG/BUS: Normal genital architecture without lesions, erythema or abnormal secretions Bartholin's, Urethra, " [place]'s normal   Urethral meatus: normal   Urethra: no masses, tenderness, or scarring   Bladder: no masses or tenderness   Vagina: atrophic with scant physiologic appearing discharge  Cervix: Multiparous, normal, no lesions  Uterus: small, smooth, firm, mobile w/o pain  Adnexa: Within normal limits and No masses, nodularity, tenderness  Rectum:anus normal       ASSESSMENT:  Encounter Diagnosis   Name Primary?     Encounter for gynecological examination without abnormal finding       Marli Ponce is a pleasant 79 year old  , who requests a breast and pelvic exam. No concerns at this time.     PLAN:   Orders Placed This Encounter   Procedures     Pelvic and Breast Exam Procedure []     Return in one year/PRN for preventive care or problems/concerns.  Verbalized understanding and agreement with visit plan.    Fouzia Kyle, MS3    Patient was seen with student who acted as my scribe and was present for learning. I personally assessed, examined and made medical decisions reflected in the documentation. Any necessary edits to the note have been made by myself.     Cami Hearn MD

## 2019-09-26 DIAGNOSIS — I10 HTN (HYPERTENSION): ICD-10-CM

## 2019-09-30 RX ORDER — LISINOPRIL 20 MG/1
TABLET ORAL
Qty: 90 TABLET | Refills: 0 | OUTPATIENT
Start: 2019-09-30

## 2019-09-30 NOTE — TELEPHONE ENCOUNTER
LISINOPRIL 20MG TABLETS      Last Written Prescription Date:  7/2/2019  Last Fill Quantity: 90,   # refills: 3  Last Office Visit : 7/2/2019  Future Office visit:  6/29/2020    Medication filled on 7/2/2019.     No new refills to be sent to the pharmacy for Patient care.     Krissy Sheppard RN  Central Triage Red Flags/Med Refills

## 2019-10-21 ENCOUNTER — OFFICE VISIT (OUTPATIENT)
Dept: OPHTHALMOLOGY | Facility: CLINIC | Age: 79
End: 2019-10-21
Attending: OPHTHALMOLOGY
Payer: MEDICARE

## 2019-10-21 DIAGNOSIS — H40.1122 PRIMARY OPEN ANGLE GLAUCOMA OF LEFT EYE, MODERATE STAGE: ICD-10-CM

## 2019-10-21 DIAGNOSIS — H40.1192 PRIMARY OPEN-ANGLE GLAUCOMA (POAG), MODERATE STAGE: Primary | ICD-10-CM

## 2019-10-21 DIAGNOSIS — H40.1192 PRIMARY OPEN-ANGLE GLAUCOMA (POAG), MODERATE STAGE: ICD-10-CM

## 2019-10-21 PROCEDURE — 92083 EXTENDED VISUAL FIELD XM: CPT | Mod: ZF | Performed by: OPHTHALMOLOGY

## 2019-10-21 PROCEDURE — G0463 HOSPITAL OUTPT CLINIC VISIT: HCPCS | Mod: ZF

## 2019-10-21 RX ORDER — TRAVOPROST OPHTHALMIC SOLUTION 0.04 MG/ML
1 SOLUTION OPHTHALMIC AT BEDTIME
Qty: 1 BOTTLE | Refills: 11 | Status: SHIPPED | OUTPATIENT
Start: 2019-10-21 | End: 2019-10-21

## 2019-10-21 RX ORDER — TRAVOPROST OPHTHALMIC SOLUTION 0.04 MG/ML
1 SOLUTION OPHTHALMIC AT BEDTIME
Qty: 1 BOTTLE | Refills: 11 | Status: SHIPPED | OUTPATIENT
Start: 2019-10-21 | End: 2020-12-03

## 2019-10-21 ASSESSMENT — REFRACTION_WEARINGRX
OS_ADD: +2.75
OS_CYLINDER: +1.50
OD_ADD: +2.75
OD_SPHERE: -0.75
OS_AXIS: 010
OS_SPHERE: +0.75
OD_AXIS: 170
OD_CYLINDER: +1.75

## 2019-10-21 ASSESSMENT — VISUAL ACUITY
METHOD: SNELLEN - LINEAR
OD_CC: 20/20
OS_CC+: -2
OS_CC: 20/30

## 2019-10-21 ASSESSMENT — SLIT LAMP EXAM - LIDS
COMMENTS: NORMAL
COMMENTS: NORMAL

## 2019-10-21 ASSESSMENT — CONF VISUAL FIELD
METHOD: COUNTING FINGERS
OD_NORMAL: 1
OS_NORMAL: 1

## 2019-10-21 ASSESSMENT — CUP TO DISC RATIO
OS_RATIO: 0.8
OD_RATIO: 0.7

## 2019-10-21 ASSESSMENT — TONOMETRY
OS_IOP_MMHG: 10
OD_IOP_MMHG: 20
IOP_METHOD: APPLANATION

## 2019-10-21 NOTE — PROGRESS NOTES
CC: follow-up Primary open angle glaucoma (POAG) moderate  Maximum intraocular pressure 24 per patient    HPI: no new issues    Ocular surgeries:   Trabeculectomy mitomycin-C and Cataract extraction with intraocular lens right eye (6/2/2015)   Trabeculectomy mitomycin-C left eye 1-10-12 (1 minute mitomycin-C and 3 flap sutures)    Ocular Rx:  Travatan Z right eye at bedtime (10/21/19)    Lumigan and latanoprost caused redness  Cosopt no effect  Tolerated travatan z and timolol    Octopus visual field 10/21/19 24-2   Stable both eyes except worse MD left eye (cataract)    OCT retinal nerve fiber layer 12/17/18   OD significant thinning compared to 3/2014 (pt had phaco/trab OD in 6/2015) but stable since   OS stable compared to 3/2014    Assessment :    1) Primary open angle glaucoma (POAG) adv left eye   Trabeculectomy mitomycin-C left eye 1-10-12 (1 minute mitomycin-C and 3 flap sutures)  Trabeculectomy mitomycin-C and Cataract extraction with intraocular lens right eye (6/3/2015)    2) Cataract, left eye-mild  Likely visually significant  Pt prefers to monitor for now     Plan  Add Travatan Z at bedtime to right eye  Intraocular pressure check in about 6 weeks    Attending Physician Attestation:  Complete documentation of historical and exam elements from today's encounter can be found in the full encounter summary report (not reduplicated in this progress note). I personally obtained the chief complaint(s) and history of present illness. I confirmed and edited asnecessary the review of systems, past medical/surgical history, family history, social history, and examination findings as documented by others; and I examined the patient myself. I personally reviewed the relevant tests, images, and reports as documented above. I formulated and edited as necessary the assessment and plan and discussed the findings and management plan with the patient and family.  - Karma Bustillos MD 1:52 PM 10/21/2019

## 2019-10-21 NOTE — NURSING NOTE
Chief Complaints and History of Present Illnesses   Patient presents with     Glaucoma Follow-Up     Chief Complaint(s) and History of Present Illness(es)     Glaucoma Follow-Up     Associated symptoms: Negative for floaters, flashes, dryness and eye pain    Pain scale: 0/10              Comments     Primary open-angle glaucoma (POAG), moderate stage. She states no vision change since last visit.      Luan Young COT 12:36 PM October 21, 2019

## 2019-11-02 ENCOUNTER — HEALTH MAINTENANCE LETTER (OUTPATIENT)
Age: 79
End: 2019-11-02

## 2019-12-04 ENCOUNTER — OFFICE VISIT (OUTPATIENT)
Dept: OPHTHALMOLOGY | Facility: CLINIC | Age: 79
End: 2019-12-04
Attending: OPHTHALMOLOGY
Payer: MEDICARE

## 2019-12-04 DIAGNOSIS — H40.1132 PRIMARY OPEN ANGLE GLAUCOMA (POAG) OF BOTH EYES, MODERATE STAGE: Primary | ICD-10-CM

## 2019-12-04 PROBLEM — G44.209 TENSION-TYPE HEADACHE: Status: ACTIVE | Noted: 2017-11-07

## 2019-12-04 PROBLEM — M79.18 MYOFASCIAL PAIN: Status: ACTIVE | Noted: 2017-11-07

## 2019-12-04 PROCEDURE — G0463 HOSPITAL OUTPT CLINIC VISIT: HCPCS | Mod: ZF

## 2019-12-04 ASSESSMENT — CONF VISUAL FIELD
OD_NORMAL: 1
METHOD: COUNTING FINGERS
OS_NORMAL: 1

## 2019-12-04 ASSESSMENT — SLIT LAMP EXAM - LIDS
COMMENTS: NORMAL
COMMENTS: NORMAL

## 2019-12-04 ASSESSMENT — TONOMETRY
IOP_METHOD: APPLANATION
OD_IOP_MMHG: 16
IOP_METHOD: APPLANATION
OS_IOP_MMHG: 10
OS_IOP_MMHG: 9
OD_IOP_MMHG: 14

## 2019-12-04 ASSESSMENT — CUP TO DISC RATIO
OS_RATIO: 0.8
OD_RATIO: 0.7

## 2019-12-04 ASSESSMENT — VISUAL ACUITY
OS_PH_CC: 20/25
METHOD: SNELLEN - LINEAR
OS_CC: 20/30
CORRECTION_TYPE: GLASSES
OD_CC: 20/20
OD_CC+: -1
OS_PH_CC+: -1

## 2019-12-04 NOTE — LETTER
12/4/19    Dear Dr. Naidu:    I had the pleasure of seeing Marli Ponce on December 4, 2019 at the [facility].  She would like to transfer her care to you.  My exam findings are as follows:      Visual Acuity (Snellen - Linear)       Right Left    Dist cc 20/20 -1 20/30    Dist ph cc  20/25 -1    Correction:  Glasses         Tonometry (Applanation, 1:35 PM)       Right Left    Pressure 16 10      Tonometry #2 (Applanation, 2:00 PM)       Right Left    Pressure 14 9         Main Ophthalmology Exam     Slit Lamp Exam       Right Left    Lids/Lashes Normal Normal    Conjunctiva/Sclera  moderate, thin walled bleb mod thin walled bleb    Cornea Clear Clear    Anterior Chamber Deep and quiet Deep and quiet    Iris Patent peripheral iridectomy Round and reactive    Lens PCIOL with mild PCO  2+ Nuclear sclerosis cataract with central vacuoles, 1+ Cortical cataract          Fundus Exam       Right Left    Disc superior rim undermined superior rim undermined, tough view    C/D Ratio 0.7 0.8                  My assessment and plan is:  CC: follow-up Primary open angle glaucoma (POAG) moderate  Maximum intraocular pressure 24 per patient    HPI: no new issues. Started Travatan right eye, she used it last night. Reports good compliance. She is tolerating the drop without any problems. No concerns.     Ocular surgeries:   Trabeculectomy mitomycin-C and Cataract extraction with intraocular lens right eye (6/2/2015)   Trabeculectomy mitomycin-C left eye 1-10-12 (1 minute mitomycin-C and 3 flap sutures)    Ocular Rx:  Travatan Z right eye at bedtime (10/21/19)    Lumigan and latanoprost caused redness  Cosopt no effect  Tolerated travatan z and timolol    Octopus visual field 10/21/19 24-2   Stable both eyes except worse MD left eye (cataract)    OCT retinal nerve fiber layer 12/17/18   OD significant thinning compared to 3/2014 (pt had phaco/trab OD in 6/2015) but stable since   OS stable compared to  3/2014    Assessment :    1) Primary open angle glaucoma (POAG) adv left eye   Trabeculectomy mitomycin-C left eye 1-10-12 (1 minute mitomycin-C and 3 flap sutures)  Trabeculectomy mitomycin-C and Cataract extraction with intraocular lens right eye (6/3/2015)    2) Cataract, left eye-mild  Likely visually significant  Pt prefers to monitor for now     Plan  Good IOP response with Travatan Z  Continue Travatan Z at bedtime to right eye  RTC 4-6 months for IOP check, repeat OCT RNFL   Would like to transfer care to MD Loly Wiggins MD  Ophthalmology Resident, PGY-3    Attending Physician Attestation:  Complete documentation of historical and exam elements from today's encounter can be found in the full encounter summary report (not reduplicated in this progress note). I personally obtained the chief complaint(s) and history of present illness. I confirmed and edited asnecessary the review of systems, past medical/surgical history, family history, social history, and examination findings as documented by others; and I examined the patient myself. I personally reviewed the relevant tests, images, and reports as documented above. I formulated and edited as necessary the assessment and plan and discussed the findings and management plan with the patient and family.  - Karma Bustillos MD 2:29 PM 12/4/2019              Sincerely,       Karma Bustillos MD  Glaucoma   [employer]

## 2019-12-04 NOTE — PROGRESS NOTES
CC: follow-up Primary open angle glaucoma (POAG) moderate  Maximum intraocular pressure 24 per patient    HPI: no new issues. Started Travatan right eye, she used it last night. Reports good compliance. She is tolerating the drop without any problems. No concerns.     Ocular surgeries:   Trabeculectomy mitomycin-C and Cataract extraction with intraocular lens right eye (6/2/2015)   Trabeculectomy mitomycin-C left eye 1-10-12 (1 minute mitomycin-C and 3 flap sutures)    Ocular Rx:  Travatan Z right eye at bedtime (10/21/19)    Lumigan and latanoprost caused redness  Cosopt no effect  Tolerated travatan z and timolol    Octopus visual field 10/21/19 24-2   Stable both eyes except worse MD left eye (cataract)    OCT retinal nerve fiber layer 12/17/18   OD significant thinning compared to 3/2014 (pt had phaco/trab OD in 6/2015) but stable since   OS stable compared to 3/2014    Assessment :    1) Primary open angle glaucoma (POAG) adv left eye   Trabeculectomy mitomycin-C left eye 1-10-12 (1 minute mitomycin-C and 3 flap sutures)  Trabeculectomy mitomycin-C and Cataract extraction with intraocular lens right eye (6/3/2015)    2) Cataract, left eye-mild  Likely visually significant  Pt prefers to monitor for now     Plan  Good IOP response with Travatan Z  Continue Travatan Z at bedtime to right eye  RTC 4-6 months for IOP check, repeat OCT RNFL   Would like to transfer care to MD Loly Wiggins MD  Ophthalmology Resident, PGY-3    Attending Physician Attestation:  Complete documentation of historical and exam elements from today's encounter can be found in the full encounter summary report (not reduplicated in this progress note). I personally obtained the chief complaint(s) and history of present illness. I confirmed and edited asnecessary the review of systems, past medical/surgical history, family history, social history, and examination findings as documented by others; and I examined the patient  myself. I personally reviewed the relevant tests, images, and reports as documented above. I formulated and edited as necessary the assessment and plan and discussed the findings and management plan with the patient and family.  - Karma Bustillos MD 2:29 PM 12/4/2019

## 2019-12-04 NOTE — NURSING NOTE
Chief Complaints and History of Present Illnesses   Patient presents with     Glaucoma Follow-Up     Chief Complaint(s) and History of Present Illness(es)     Glaucoma Follow-Up     Laterality: right eye    Associated symptoms: Negative for eye pain, dryness, redness and tearing    Treatment side effects: none    Compliance with Treatment: always    Pain scale: 0/10              Comments     Travatan Z at bedtime to RE / LD @ 10 pm last ezra / states very compliant with dosing.  Tolerating Travatan Z with no side effects per pt.  Lara Schuler, CANDACE COT 1:37 PM 12/04/2019

## 2020-06-29 ENCOUNTER — OFFICE VISIT (OUTPATIENT)
Dept: INTERNAL MEDICINE | Facility: CLINIC | Age: 80
End: 2020-06-29
Payer: MEDICARE

## 2020-06-29 VITALS
SYSTOLIC BLOOD PRESSURE: 177 MMHG | WEIGHT: 130 LBS | OXYGEN SATURATION: 99 % | BODY MASS INDEX: 24.7 KG/M2 | DIASTOLIC BLOOD PRESSURE: 78 MMHG | HEART RATE: 60 BPM

## 2020-06-29 DIAGNOSIS — Z12.31 ENCOUNTER FOR SCREENING MAMMOGRAM FOR BREAST CANCER: ICD-10-CM

## 2020-06-29 DIAGNOSIS — E03.9 HYPOTHYROIDISM, UNSPECIFIED TYPE: ICD-10-CM

## 2020-06-29 DIAGNOSIS — E78.49 OTHER HYPERLIPIDEMIA: ICD-10-CM

## 2020-06-29 DIAGNOSIS — I10 ESSENTIAL HYPERTENSION: ICD-10-CM

## 2020-06-29 DIAGNOSIS — M85.859 OSTEOPENIA OF HIP, UNSPECIFIED LATERALITY: ICD-10-CM

## 2020-06-29 DIAGNOSIS — Z00.00 ENCOUNTER FOR MEDICARE ANNUAL WELLNESS EXAM: Primary | ICD-10-CM

## 2020-06-29 LAB
ALBUMIN SERPL-MCNC: 3.7 G/DL (ref 3.4–5)
ALP SERPL-CCNC: 90 U/L (ref 40–150)
ALT SERPL W P-5'-P-CCNC: 22 U/L (ref 0–50)
ANION GAP SERPL CALCULATED.3IONS-SCNC: 4 MMOL/L (ref 3–14)
AST SERPL W P-5'-P-CCNC: 18 U/L (ref 0–45)
BILIRUB SERPL-MCNC: 0.8 MG/DL (ref 0.2–1.3)
BUN SERPL-MCNC: 18 MG/DL (ref 7–30)
CALCIUM SERPL-MCNC: 9.1 MG/DL (ref 8.5–10.1)
CHLORIDE SERPL-SCNC: 106 MMOL/L (ref 94–109)
CHOLEST SERPL-MCNC: 298 MG/DL
CO2 SERPL-SCNC: 29 MMOL/L (ref 20–32)
CREAT SERPL-MCNC: 0.86 MG/DL (ref 0.52–1.04)
GFR SERPL CREATININE-BSD FRML MDRD: 63 ML/MIN/{1.73_M2}
GLUCOSE SERPL-MCNC: 89 MG/DL (ref 70–99)
HDLC SERPL-MCNC: 118 MG/DL
LDLC SERPL CALC-MCNC: 157 MG/DL
NONHDLC SERPL-MCNC: 180 MG/DL
POTASSIUM SERPL-SCNC: 4.1 MMOL/L (ref 3.4–5.3)
PROT SERPL-MCNC: 7 G/DL (ref 6.8–8.8)
SODIUM SERPL-SCNC: 139 MMOL/L (ref 133–144)
TRIGL SERPL-MCNC: 118 MG/DL
TSH SERPL DL<=0.005 MIU/L-ACNC: 0.43 MU/L (ref 0.4–4)

## 2020-06-29 PROCEDURE — 82306 VITAMIN D 25 HYDROXY: CPT | Performed by: INTERNAL MEDICINE

## 2020-06-29 ASSESSMENT — PAIN SCALES - GENERAL
PAINLEVEL: NO PAIN (0)
PAINLEVEL: NO PAIN (0)

## 2020-06-29 NOTE — PROGRESS NOTES
Medicare Annual Wellness Visit Previsit note    Marli is a pleasant 80 yo female with PMH notable for HTN, HLD, osteopenia and hypothyroidism who is here for an Annual Wellness Visit.    Generally well without complaints today. Checks BP at home and ranges closer to   120-130/70s at home.    Osteopenia of hips on dexa but elected not for therapy with her past physician.  She takes Ca/D.    Walking for exercise, morning does aerobics. Mentally good, no depression.  Eats a lot of fruits and vegetables.  Good support system. Drinks very little EtOH, 2-3 glasses per week.               Medical Care     Have you been to an ER or a hospital in the last year? No  What other specialists or organizations are involved in your medical care?    Current providers sharing in care for this patient include:  Patient Care Team       Relationship Specialty Notifications Start End    Ana María Rincon MD PCP - General Internal Medicine  12/23/11     Karma Bustillos MD MD Ophthalmology  3/11/14     Phone: [phone] Fax: [phone]         [address] [facility] [address] [facility] [address]    Karina Barros MD MD Internal Medicine  4/29/15     primary care clinic    Phone: [phone] Pager: [phone] Fax: [phone]        [address] [facility] [address] [facility] [address]    Cami Hearn MD MD OB/Gyn  5/18/15     Phone: [phone] Fax: [phone]         [address]  [facility] [address]    Adelina Prado MD MD Dermatology  6/26/15     Phone: [phone] Fax: [phone]         [address] [facility] [address] [facility] [address]    Negro Williamson MD MD Orthopedics  4/18/16     Phone: [phone] Fax: [phone]         [address] [facility] [address]    Ana María Rincon MD Assigned PCP   6/21/20                  Social History     Marital Status:  Who lives in your household?  and daughter  Does your home have any of the following safety  concerns? Loose rugs in the hallway, no grab bars in the bathroom, no handrails on the stairs or have poorly lit areas?  No  Do you feel threatened or controlled by a partner, ex-partner or anyone in your life? No  Has anyone hurt you physically, for example by pushing, hitting, slapping or kicking you   or forcing you to have sex? No  Do you need help with the phone, transportation, shopping, preparing meals, housework, laundry, medications or managing money? No   Have you noticed any hearing difficulties? No      Risk Behaviors and Healthy Habits     How many servings of fruits and vegetables do you eat a day? 1 serving  How often do you exercise and what do you do? 60 minutes 5 times a week  Do you frequently ride without a seatbelt? No  Do you use tobacco?  No  Do you use any other drugs? No       Do you use alcohol?Yes  Number of drinking days a week 2      Sexual Health     Are you sexually active? Yes with    If yes, with men, women, or both? Male  If yes, do you more than one current partner?No  If yes, do you use condoms? No  Have you had any sexually transmitted infections? No  Any sexual concerns? No       FOR WOMEN ONLY  What year did you stop having periods? unusre  Any vaginal bleeding in the last year? No  Have you ever had an abnormal Pap smear? No      A full 10-pt Review of Systems was performed, verified and is negative except as documented in the HPI.  All health questionnaires were reviewed, verified and relevant information documented above.    Past Medical History:  Past Medical History:   Diagnosis Date     Cataract      Hyperlipidemia LDL goal < 130      Hypertension     white coat; home blood pressure monitoring April 2016  average systolic blood pressure approximately 115.     Hypothyroidism      Low bone density 8/16/2017    FRAX tenure probability of fracture, major osteoporotic: 14.6%; hip fracture 4% (increased) Plan:  Alendronate 70 mg weekly; trial basis to assess for reflux  esophagitis     Ocular hypertension      Primary open-angle glaucoma     adv       Past Surgical History:  Past Surgical History:   Procedure Laterality Date     CHOLECYSTECTOMY       LAPAROSCOPIC APPENDECTOMY       LASER IRIDOTOMY OD (RIGHT EYE)      RE/LE  pre 2003     LASER IRIDOTOMY OS (LEFT EYE)  11/20/2010     SELECTIVE LASER TRABECULOPLASTY (SLT) OS (LEFT EYE)  1/22/2010    LE     TRABECULECTOMY, MITOMYCIN FILTER, COMBINED  1/10/2012    LE       Active Meds:  Current Outpatient Medications   Medication     ASPIRIN 81 PO     BIOTIN PO     levothyroxine (SYNTHROID/LEVOTHROID) 50 MCG tablet     lisinopril (PRINIVIL/ZESTRIL) 20 MG tablet     Multiple Vitamin (MULTIVITAMIN) per tablet     omeprazole (PRILOSEC) 20 MG DR capsule     travoprost CARMELA FREE (TRAVATAN Z) 0.004 % ophthalmic solution     Calcium Carbonate-Vitamin D (CALCIUM + D PO)     Ketoprofen 10 % CREA     Polyvinyl Alcohol-Povidone (REFRESH OP)     No current facility-administered medications for this visit.         Allergies:  Patient has no known allergies.    Family History:  family history includes Breast Cancer (age of onset: 38) in her sister; Cancer (age of onset: 78) in her mother; Cerebrovascular Disease (age of onset: 52) in her father; Glaucoma in her maternal grandfather and mother; Hypertension in her father and mother.    Social History:  Social History     Tobacco Use     Smoking status: Never Smoker     Smokeless tobacco: Never Used   Substance Use Topics     Alcohol use: Yes     Alcohol/week: 0.0 standard drinks     Comment: rare tequilla     Drug use: No       Physical Exam:  Vitals: BP (!) 177/78   Pulse 60   Wt 59 kg (130 lb)   SpO2 99%   BMI 24.70 kg/m    Constitutional: Alert, oriented, pleasant, no acute distress  Head: Normocephalic, atraumatic  Eyes: Extra-ocular movements intact, pupils equally round and reactive bilaterally, no scleral icterus  ENT: Oropharynx clear, moist mucus membranes, fair dentition  Neck: Supple, no  lymphadenopathy, no thyromegaly  Cardiovascular: Regular rate and rhythm, no murmurs, rubs or gallops, peripheral pulses full/symmetric  Respiratory: Good air movement bilaterally, lungs clear, no wheezes/rales/rhonchi  GI: Abdomen soft, bowel sounds present, nondistended, nontender, no organomegaly or masses, no rebound/guarding  Musculoskeletal:   No edema, normal muscle tone, normal gait  Neurologic: Alert and oriented, cranial nerves 2-12 intact, strength 5/5 throughout, sensation to light touch intact, reflexes 2+ bilaterally  Skin: No rashes/lesions  Psychiatric: normal mentation, affect and mood          Assessment and Plan:    Marli was seen today for medicare visit.    Diagnoses and all orders for this visit:    Encounter for Medicare annual wellness exam    Essential hypertension  -     Comprehensive metabolic panel; Future    Hypothyroidism, unspecified type  -     TSH with free T4 reflex; Future    Other hyperlipidemia  -     Lipid Profile FASTING; Future    Osteopenia of hip, unspecified laterality  -     Vitamin D Deficiency; Future    Encounter for screening mammogram for breast cancer  Discussed risks/benefits of screening.  -     Mammogram, routine screening; Future    #Routine Health Maintenence:  Immunizations (zoster, pneumovax, flu, Tdap, Hep A/B):  Advised shingrix vaccination  Most Recent Immunizations   Administered Date(s) Administered     HEPA 07/26/2005     Influenza (IIV3) PF 09/14/2017     Meningococcal (Menomune ) 07/26/2005     Pneumo Conj 13-V (2010&after) 04/22/2015     Pneumococcal 23 valent 03/17/2014     Poliovirus, inactivated (IPV) 07/26/2005     TD (ADULT, 7+) 06/02/2004     TDAP Vaccine (Boostrix) 03/13/2013     Tdap (Adacel,Boostrix) 03/13/2013     Typhoid IM 07/26/2005     Yellow Fever 07/26/2005     Zoster vaccine, live 03/13/2013     Lipids:   Recent Labs   Lab Test 05/10/17  1201 05/20/16  1127  04/22/15  1048 03/17/14  1709   CHOL 294* 218*   < > 227* 217*    130    < > 117 103   * 78   < > 94 96   TRIG 64 50   < > 79 94   CHOLHDLRATIO  --   --   --  1.9 2.1    < > = values in this interval not displayed.     Lung Ca Screening (>30 pk age 55-79 or >20 py age 50-79 + RF): n/a  Colonoscopy (50-75 yrs): 10/09 divertic only, no polyp  Dexa (>65W or 70M yrs): 7/19 Conclusions:  The most negative and valid T-score of -1.7 at the level of the left femoral neck and right femoral neck corresponds with low bone density according to WHO criteria for postmenopausal females and men age 50 and over. The risk of osteoporotic fracture increases approximately 2-fold for each 1.0 SD decrease in T-score.  Mammogram (40-75 yrs): last 8/17  Pap (21-65 yrs):   Lab Results   Component Value Date    PAP NIL 06/29/2012     Safety/Lifestyle: reviewed  Tob/EtOH: reviewed  Depression: screen neg  Advanced Directive: not discussed    Return to clinic: 1 year or prn    Noemi Collins MD  Internal Medicine

## 2020-06-29 NOTE — PATIENT INSTRUCTIONS
[facility] ([facility]) [phone] (2nd Floor [facility])     [facility] ([facility]) [phone] ([address])       Consider getting shingles vaccine at your pharmacy.    Check blood pressure: goal is less than 130/90.    Get mammogram.    Patient Education   Personalized Prevention Plan  You are due for the preventive services outlined below.  Your care team is available to assist you in scheduling these services.  If you have already completed any of these items, please share that information with your care team to update in your medical record.  Health Maintenance Due   Topic Date Due     Zoster (Shingles) Vaccine (2 of 3) 05/08/2013     Mammogram  08/11/2018     PHQ-2  01/01/2020     FALL RISK ASSESSMENT  02/26/2020     Annual Wellness Visit  07/02/2020     Preventive Health Recommendations    See your health care provider every year to    Review health changes.     Discuss preventive care.      Review your medicines if your doctor has prescribed any.    You no longer need a yearly Pap test unless you've had an abnormal Pap test in the past 10 years. If you have vaginal symptoms, such as bleeding or discharge, be sure to talk with your provider about a Pap test.    Every 1 to 2 years, have a mammogram.  If you are over 69, talk with your health care provider about whether or not you want to continue having screening mammograms.    Every 10 years, have a colonoscopy. Or, have a yearly FIT test (stool test). These exams will check for colon cancer.     Have a cholesterol test every 5 years, or more often if your doctor advises it.     Have a diabetes test (fasting glucose) every three years. If you are at risk for diabetes, you should have this test more often.     At age 65, have a bone density scan (DEXA) to check for osteoporosis (brittle bone disease).    Shots:    Get a flu shot each year.    Get a tetanus shot every 10 years.    Talk to your doctor about your  pneumonia vaccines. There are now two you should receive - Pneumovax (PPSV 23) and Prevnar (PCV 13).    Talk to your pharmacist about the shingles vaccine.    Talk to your doctor about the hepatitis B vaccine.    Nutrition:     Eat at least 5 servings of fruits and vegetables each day.    Eat whole-grain bread, whole-wheat pasta and brown rice instead of white grains and rice.    Get adequate Calcium and Vitamin D.     Lifestyle    Exercise at least 150 minutes a week (30 minutes a day, 5 days a week). This will help you control your weight and prevent disease.    Limit alcohol to one drink per day.    No smoking.     Wear sunscreen to prevent skin cancer.     See your dentist twice a year for an exam and cleaning.    See your eye doctor every 1 to 2 years to screen for conditions such as glaucoma, macular degeneration and cataracts.    Personalized Prevention Plan  You are due for the preventive services outlined below.  Your care team is available to assist you in scheduling these services.  If you have already completed any of these items, please share that information with your care team to update in your medical record.  Health Maintenance Due   Topic Date Due     Zoster (Shingles) Vaccine (2 of 3) 05/08/2013     Mammogram  08/11/2018     PHQ-2  01/01/2020     FALL RISK ASSESSMENT  02/26/2020     Annual Wellness Visit  07/02/2020

## 2020-06-29 NOTE — NURSING NOTE
Chief Complaint   Patient presents with     Medicare Visit     previous pt of Dr. Rincon. Pt here for medicare wellness physical     Kimberly Nissen, EMT at 11:14 AM on 6/29/2020

## 2020-06-30 LAB — DEPRECATED CALCIDIOL+CALCIFEROL SERPL-MC: 43 UG/L (ref 20–75)

## 2020-07-02 RX ORDER — LEVOTHYROXINE SODIUM 50 UG/1
50 TABLET ORAL DAILY
Qty: 90 TABLET | Refills: 3 | Status: SHIPPED | OUTPATIENT
Start: 2020-07-02 | End: 2021-06-14

## 2020-07-02 RX ORDER — LISINOPRIL 20 MG/1
20 TABLET ORAL DAILY
Qty: 90 TABLET | Refills: 3 | Status: SHIPPED | OUTPATIENT
Start: 2020-07-02 | End: 2021-06-14

## 2020-08-14 DIAGNOSIS — K21.9 GASTROESOPHAGEAL REFLUX DISEASE WITHOUT ESOPHAGITIS: ICD-10-CM

## 2020-11-11 DIAGNOSIS — K21.9 GASTROESOPHAGEAL REFLUX DISEASE WITHOUT ESOPHAGITIS: ICD-10-CM

## 2020-11-14 ENCOUNTER — HEALTH MAINTENANCE LETTER (OUTPATIENT)
Age: 80
End: 2020-11-14

## 2020-11-15 NOTE — TELEPHONE ENCOUNTER
6/29/2020  [facility]   Noemi Collins MD  Internal Medicine     omeprazole (PRILOSEC) 20 MG DR capsule

## 2020-12-02 DIAGNOSIS — H40.1122 PRIMARY OPEN ANGLE GLAUCOMA OF LEFT EYE, MODERATE STAGE: ICD-10-CM

## 2020-12-03 RX ORDER — TRAVOPROST OPHTHALMIC SOLUTION 0.04 MG/ML
1 SOLUTION OPHTHALMIC AT BEDTIME
Qty: 2.5 ML | Refills: 1 | Status: SHIPPED | OUTPATIENT
Start: 2020-12-03

## 2020-12-03 RX ORDER — TRAVOPROST 0.04 MG/ML
1 SOLUTION/ DROPS OPHTHALMIC AT BEDTIME
Qty: 1 BOTTLE | Refills: 11 | OUTPATIENT
Start: 2020-12-03

## 2020-12-03 NOTE — TELEPHONE ENCOUNTER
travoprost CARMELA FREE (TRAVATAN Z) 0.004 % ophthalmic solution  Last Written Prescription Date:  10/21/19   Last Fill Quantity: 1 bottle,   # refills: 11  Last Office Visit : 12/4/19  Future Office visit: none    RTC 4-6 months     Travatan Z right eye at bedtime

## 2021-02-09 ENCOUNTER — IMMUNIZATION (OUTPATIENT)
Dept: PEDIATRICS | Facility: CLINIC | Age: 81
End: 2021-02-09
Payer: MEDICARE

## 2021-02-09 PROCEDURE — 0001A PR COVID VAC PFIZER DIL RECON 30 MCG/0.3 ML IM: CPT

## 2021-02-09 PROCEDURE — 91300 PR COVID VAC PFIZER DIL RECON 30 MCG/0.3 ML IM: CPT

## 2021-03-02 ENCOUNTER — IMMUNIZATION (OUTPATIENT)
Dept: PEDIATRICS | Facility: CLINIC | Age: 81
End: 2021-03-02
Attending: INTERNAL MEDICINE
Payer: MEDICARE

## 2021-03-02 PROCEDURE — 0002A PR COVID VAC PFIZER DIL RECON 30 MCG/0.3 ML IM: CPT

## 2021-03-02 PROCEDURE — 91300 PR COVID VAC PFIZER DIL RECON 30 MCG/0.3 ML IM: CPT

## 2021-06-09 DIAGNOSIS — I10 ESSENTIAL HYPERTENSION: ICD-10-CM

## 2021-06-09 DIAGNOSIS — E03.9 HYPOTHYROIDISM, UNSPECIFIED TYPE: ICD-10-CM

## 2021-06-14 RX ORDER — LEVOTHYROXINE SODIUM 50 UG/1
50 TABLET ORAL DAILY
Qty: 31 TABLET | Refills: 0 | Status: SHIPPED | OUTPATIENT
Start: 2021-06-14 | End: 2021-07-15

## 2021-06-14 RX ORDER — LISINOPRIL 20 MG/1
20 TABLET ORAL DAILY
Qty: 31 TABLET | Refills: 0 | Status: SHIPPED | OUTPATIENT
Start: 2021-06-14 | End: 2021-07-15

## 2021-06-14 NOTE — TELEPHONE ENCOUNTER
Last Clinic Visit: 6/29/2020  [facility]  Future Visit: 7/15/2021    Last BP's above parameters.  Future visit scheduled for 7/15/2021.    Refills sent for 31 day supply and FYI to provider.  * TSH lab due by 6/29/2021- message sent to [facility] clinic.      BP Readings from Last 3 Encounters:   06/29/20 (!) 177/78   09/19/19 (!) 163/78   07/30/19 (!) 155/73     TSH   Date Value Ref Range Status   06/29/2020 0.43 0.40 - 4.00 mU/L Final

## 2021-07-15 ENCOUNTER — LAB (OUTPATIENT)
Dept: LAB | Facility: CLINIC | Age: 81
End: 2021-07-15

## 2021-07-15 ENCOUNTER — ANCILLARY PROCEDURE (OUTPATIENT)
Dept: MAMMOGRAPHY | Facility: CLINIC | Age: 81
End: 2021-07-15
Attending: INTERNAL MEDICINE
Payer: MEDICARE

## 2021-07-15 ENCOUNTER — OFFICE VISIT (OUTPATIENT)
Dept: INTERNAL MEDICINE | Facility: CLINIC | Age: 81
End: 2021-07-15
Payer: MEDICARE

## 2021-07-15 VITALS
HEIGHT: 60 IN | RESPIRATION RATE: 16 BRPM | HEART RATE: 62 BPM | SYSTOLIC BLOOD PRESSURE: 147 MMHG | OXYGEN SATURATION: 97 % | DIASTOLIC BLOOD PRESSURE: 71 MMHG | BODY MASS INDEX: 25.23 KG/M2 | WEIGHT: 128.5 LBS

## 2021-07-15 DIAGNOSIS — Z12.31 ENCOUNTER FOR SCREENING MAMMOGRAM FOR BREAST CANCER: ICD-10-CM

## 2021-07-15 DIAGNOSIS — R79.9 ABNORMAL FINDING OF BLOOD CHEMISTRY, UNSPECIFIED: ICD-10-CM

## 2021-07-15 DIAGNOSIS — K21.9 GASTROESOPHAGEAL REFLUX DISEASE WITHOUT ESOPHAGITIS: ICD-10-CM

## 2021-07-15 DIAGNOSIS — E78.49 OTHER HYPERLIPIDEMIA: ICD-10-CM

## 2021-07-15 DIAGNOSIS — E78.49 OTHER HYPERLIPIDEMIA: Primary | ICD-10-CM

## 2021-07-15 DIAGNOSIS — E03.9 HYPOTHYROIDISM, UNSPECIFIED TYPE: ICD-10-CM

## 2021-07-15 DIAGNOSIS — M85.859 OSTEOPENIA OF HIP, UNSPECIFIED LATERALITY: ICD-10-CM

## 2021-07-15 DIAGNOSIS — I10 ESSENTIAL HYPERTENSION: ICD-10-CM

## 2021-07-15 LAB
ALBUMIN SERPL-MCNC: 3.8 G/DL (ref 3.4–5)
ALP SERPL-CCNC: 80 U/L (ref 40–150)
ALT SERPL W P-5'-P-CCNC: 23 U/L (ref 0–50)
ANION GAP SERPL CALCULATED.3IONS-SCNC: 5 MMOL/L (ref 3–14)
AST SERPL W P-5'-P-CCNC: 19 U/L (ref 0–45)
BILIRUB SERPL-MCNC: 1 MG/DL (ref 0.2–1.3)
BUN SERPL-MCNC: 19 MG/DL (ref 7–30)
CALCIUM SERPL-MCNC: 9.5 MG/DL (ref 8.5–10.1)
CHLORIDE BLD-SCNC: 106 MMOL/L (ref 94–109)
CHOLEST SERPL-MCNC: 315 MG/DL
CO2 SERPL-SCNC: 29 MMOL/L (ref 20–32)
CREAT SERPL-MCNC: 1.02 MG/DL (ref 0.52–1.04)
FASTING STATUS PATIENT QL REPORTED: YES
GFR SERPL CREATININE-BSD FRML MDRD: 51 ML/MIN/1.73M2
GLUCOSE BLD-MCNC: 90 MG/DL (ref 70–99)
HBA1C MFR BLD: 5.5 % (ref 0–5.6)
HDLC SERPL-MCNC: 134 MG/DL
LDLC SERPL CALC-MCNC: 166 MG/DL
NONHDLC SERPL-MCNC: 181 MG/DL
POTASSIUM BLD-SCNC: 4.3 MMOL/L (ref 3.4–5.3)
PROT SERPL-MCNC: 7.3 G/DL (ref 6.8–8.8)
SODIUM SERPL-SCNC: 140 MMOL/L (ref 133–144)
TRIGL SERPL-MCNC: 76 MG/DL
TSH SERPL DL<=0.005 MIU/L-ACNC: 0.72 MU/L (ref 0.4–4)

## 2021-07-15 PROCEDURE — 83036 HEMOGLOBIN GLYCOSYLATED A1C: CPT | Performed by: PATHOLOGY

## 2021-07-15 PROCEDURE — 36415 COLL VENOUS BLD VENIPUNCTURE: CPT | Performed by: PATHOLOGY

## 2021-07-15 PROCEDURE — 80061 LIPID PANEL: CPT | Performed by: PATHOLOGY

## 2021-07-15 PROCEDURE — G0439 PPPS, SUBSEQ VISIT: HCPCS | Performed by: INTERNAL MEDICINE

## 2021-07-15 PROCEDURE — 80053 COMPREHEN METABOLIC PANEL: CPT | Performed by: PATHOLOGY

## 2021-07-15 PROCEDURE — 77067 SCR MAMMO BI INCL CAD: CPT | Mod: GC | Performed by: STUDENT IN AN ORGANIZED HEALTH CARE EDUCATION/TRAINING PROGRAM

## 2021-07-15 PROCEDURE — 84443 ASSAY THYROID STIM HORMONE: CPT | Performed by: PATHOLOGY

## 2021-07-15 RX ORDER — LISINOPRIL 20 MG/1
20 TABLET ORAL DAILY
Qty: 90 TABLET | Refills: 3 | Status: SHIPPED | OUTPATIENT
Start: 2021-07-15 | End: 2022-07-13

## 2021-07-15 RX ORDER — MULTIVITAMIN
1 TABLET ORAL
COMMUNITY
End: 2021-07-15

## 2021-07-15 RX ORDER — LEVOTHYROXINE SODIUM 50 UG/1
50 TABLET ORAL DAILY
Qty: 90 TABLET | Refills: 3 | Status: SHIPPED | OUTPATIENT
Start: 2021-07-15 | End: 2022-07-18

## 2021-07-15 ASSESSMENT — PAIN SCALES - GENERAL: PAINLEVEL: NO PAIN (0)

## 2021-07-15 ASSESSMENT — MIFFLIN-ST. JEOR: SCORE: 968.12

## 2021-07-15 NOTE — NURSING NOTE
The patient was greeted in the 4th floor lobby and escorted back to the clinic. The patient's weight was recorded without incident. The patient was escorted to the exam room without incident. The reason for today's visit was discussed with the patient. The following are the primary complaints of the patient:     Chief Complaint   Patient presents with     Physical     The patient presents for an annual physical today.       The patient's allergies and medications were reviewed as noted. A set of vitals were recorded as noted without incident. The patient does not have any other questions for the provider.    Abilio Baldwin, EMT at 10:28 AM on 7/15/2021

## 2021-07-15 NOTE — PROGRESS NOTES
History of Present Illness:  Ms. Ponce is a 80 year old female who presents for  Chief Complaint   Patient presents with     Physical     The patient presents for an annual physical today.         Medicare Annual Wellness Visit Previsit note    Marli is a pleasant 81 yo female with PMH notable for HTN, HLD, osteopenia and hypothyroidism who is here for an Annual Wellness Visit.    Reports generally good health.  Got COVID vaccine.    Notes nocturnal leg cramps, frequently.  Drinks a lot.  Uses theraworks cramps.  Taking collagen peptides bovine for knees.    Generally well without complaints today. Checks BP at home and ranges closer to   120-130/70-80s at home.    Does aerobics, resistance bands.    Osteopenia of hips on dexa but elected not for therapy with her past physician.  No fractures.  She takes Ca/D.    Walking for exercise, morning does aerobics. Mentally good, no depression.  Eats a lot of fruits and vegetables.  Good support system. Drinks very little EtOH, 2-3 glasses per week.    Glaucoma/cataracts, sees eye doctor q 3 months.  No concerns about hearing.  No concerns about memory.  Has adv directive.    The ASCVD Risk score ([place] MEI Jr., et al., 2013) failed to calculate for the following reasons:    The 2013 ASCVD risk score is only valid for ages 40 to 79      Routine Health Maintenence:  Immunizations (zoster, pneumovax, flu, Tdap, Hep A/B):  Advised shingrix vaccination  Most Recent Immunizations   Administered Date(s) Administered     COVID-19,PF,Pfizer 03/02/2021     HEPA 07/26/2005     Influenza (IIV3) PF 09/14/2017     Meningococcal (Menomune ) 07/26/2005     Pneumo Conj 13-V (2010&after) 04/22/2015     Pneumococcal 23 valent 03/17/2014     Poliovirus, inactivated (IPV) 07/26/2005     TD (ADULT, 7+) 06/02/2004     TDAP Vaccine (Boostrix) 03/13/2013     Tdap (Adacel,Boostrix) 03/13/2013     Typhoid IM 07/26/2005     Yellow Fever 07/26/2005     Zoster vaccine, live 03/13/2013     Lipids:    Recent Labs   Lab Test 06/29/20  1219 05/10/17  1201 04/22/15  1048 03/17/14  1709   CHOL 298* 294* 227* 217*    146 117 103   * 136* 94 96   TRIG 118 64 79 94   CHOLHDLRATIO  --   --  1.9 2.1       Lung Ca Screening (>30 pk age 55-79 or >20 py age 50-79 + RF): n/a  Colonoscopy (50-75 yrs): 10/09 divertic only, no polyp  Dexa (>65W or 70M yrs): 7/19 Conclusions:  The most negative and valid T-score of -1.7 at the level of the left femoral neck and right femoral neck corresponds with low bone density according to WHO criteria for postmenopausal females and men age 50 and over. The risk of osteoporotic fracture increases approximately 2-fold for each 1.0 SD decrease in T-score.  Mammogram (40-75 yrs): last 8/17  Pap (21-65 yrs):   Lab Results   Component Value Date    PAP NIL 06/29/2012     Safety/Lifestyle: reviewed  Tob/EtOH: reviewed  Depression: screen neg  Advanced Directive: not discussed      Review of external notes as documented above                   A detailed Review of Systems was performed, verified and is negative except as documented in the HPI.  All health questionnaires were reviewed, verified and relevant information documented above.    Medicare Annual Wellness Visit Previsit note    This 80 year old year old female presents for a  Medicare Wellness Exam.         Medical Care     Have you been to an ER or a hospital in the last year? The patient did not answer this question.   What other specialists or organizations are involved in your medical care?  Ophtamologist  Current providers sharing in care for this patient include:  Patient Care Team       Relationship Specialty Notifications Start End    Ana María Rincon MD PCP - General Internal Medicine  12/23/11     Karma Bustillos MD MD Ophthalmology  3/11/14     Phone: [phone] Fax: [phone]         [facility] [address]    Karina Barros MD MD Internal Medicine  4/29/15      primary care clinic    Phone: [phone] Pager: [phone] Fax: [phone] [address] [facility] [address] [facility] [address]    Cami Hearn MD MD OB/Gyn  5/18/15     Phone: [phone] Fax: [phone]         [address]  [facility] [address]    Adelina Prado MD MD Dermatology  6/26/15     Phone: [phone] Fax: [phone]         [address] [facility] [address] [facility] [address]    Negro Williamson MD MD Orthopedics  4/18/16     Phone: [phone] Fax: [phone]         [address] [facility] [address]    Noemi Collins MD Assigned PCP   5/27/21     Phone: [phone] Fax: [phone]         [address] [facility] [address] [facility] [address]                 Social History     Marital Status:  Who lives in your household? Patient did not answer this question.  Does your home have any of the following safety concerns? Loose rugs in the hallway, no grab bars in the bathroom, no handrails on the stairs or have poorly lit areas?  No  Do you feel threatened or controlled by a partner, ex-partner or anyone in your life? No  Has anyone hurt you physically, for example by pushing, hitting, slapping or kicking you   or forcing you to have sex? No  Do you need help with the phone, transportation, shopping, preparing meals, housework, laundry, medications or managing money? No   Have you noticed any hearing difficulties? No      Risk Behaviors and Healthy Habits     How many servings of fruits and vegetables do you eat a day? 2  How often do you exercise and what do you do? 50 minutes 5 a week  Do you frequently ride without a seatbelt? No  Do you use tobacco?  No  Do you use any other drugs? No       Do you use alcohol? Yes  Number of drinking days a week 2       Sexual Health     Are you sexually active? No   Have you had any sexually transmitted infections? No  Any sexual concerns? No       FOR WOMEN ONLY  What year did you stop having periods? Patient  did not answer this question.  Any vaginal bleeding in the last year? No  Have you ever had an abnormal Pap smear? No      Past Medical History:  Past Medical History:   Diagnosis Date     Cataract      Hyperlipidemia LDL goal < 130      Hypertension     white coat; home blood pressure monitoring April 2016  average systolic blood pressure approximately 115.     Hypothyroidism      Low bone density 8/16/2017    FRAX tenure probability of fracture, major osteoporotic: 14.6%; hip fracture 4% (increased) Plan:  Alendronate 70 mg weekly; trial basis to assess for reflux esophagitis     Ocular hypertension      Primary open-angle glaucoma     adv       Past Surgical History:  Past Surgical History:   Procedure Laterality Date     CHOLECYSTECTOMY       LAPAROSCOPIC APPENDECTOMY       LASER IRIDOTOMY OD (RIGHT EYE)      RE/LE  pre 2003     LASER IRIDOTOMY OS (LEFT EYE)  11/20/2010     SELECTIVE LASER TRABECULOPLASTY (SLT) OS (LEFT EYE)  1/22/2010    LE     TRABECULECTOMY, MITOMYCIN FILTER, COMBINED  1/10/2012    LE       Active Meds:  Current Outpatient Medications   Medication     ASPIRIN 81 PO     levothyroxine (SYNTHROID/LEVOTHROID) 50 MCG tablet     lisinopril (ZESTRIL) 20 MG tablet     Multiple Vitamin (MULTIVITAMIN) per tablet     omeprazole (PRILOSEC) 20 MG DR capsule     travoprost CARMELA FREE (TRAVATAN Z) 0.004 % ophthalmic solution     Polyvinyl Alcohol-Povidone (REFRESH OP)     No current facility-administered medications for this visit.        Allergies:  Patient has no known allergies.    Family History:  family history includes Breast Cancer (age of onset: 38) in her sister; Cancer (age of onset: 78) in her mother; Cerebrovascular Disease (age of onset: 52) in her father; Glaucoma in her maternal grandfather and mother; Hypertension in her father and mother.    Social History:  Social History     Tobacco Use     Smoking status: Never Smoker     Smokeless tobacco: Never Used   Substance Use Topics     Alcohol  "use: Yes     Alcohol/week: 0.0 standard drinks     Comment: rare tequilla     Drug use: No       Physical Exam:  Vitals: BP (!) 147/71 (BP Location: Left arm, Patient Position: Sitting, Cuff Size: Adult Regular)   Pulse 62   Resp 16   Ht 1.53 m (5' 0.24\")   Wt 58.3 kg (128 lb 8 oz)   SpO2 97%   BMI 24.90 kg/m    Constitutional: Alert, oriented, pleasant, no acute distress  Head: Normocephalic, atraumatic  Eyes: Extra-ocular movements intact, pupils equally round and reactive bilaterally, no scleral icterus  ENT: Oropharynx clear, moist mucus membranes  Neck: Supple, no lymphadenopathy  Cardiovascular: Regular rate and rhythm, no murmurs, rubs or gallops, peripheral pulses full/symmetric  Respiratory: Good air movement bilaterally, lungs clear, no wheezes/rales/rhonchi  GI: Abdomen soft, bowel sounds present, nondistended, nontender, no organomegaly or masses, no rebound/guarding  Musculoskeletal: No edema, normal muscle tone, normal gait  Neurologic: Alert and oriented, cranial nerves 2-12 intact, grossly non-focal, cognition normal  Skin: No rashes/lesions, SK on forehead  Psychiatric: normal mentation, affect and mood      Diagnostics:  Labs reviewed in Epic          Assessment and Plan:  Marli was seen today for physical.    Diagnoses and all orders for this visit:    Other hyperlipidemia  Unclear why she stopped statin, may benefit from continuing given relative good health.  The ASCVD Risk score ([place] MEI Jr., et al., 2013) failed to calculate for the following reasons:    The 2013 ASCVD risk score is only valid for ages 40 to 79    -     Hemoglobin A1c; Future  -     TSH with free T4 reflex; Future  -     Lipid Profile FASTING; Future    Hypothyroidism, unspecified type  -     levothyroxine (SYNTHROID/LEVOTHROID) 50 MCG tablet; Take 1 tablet (50 mcg) by mouth daily    Gastroesophageal reflux disease without esophagitis  -     omeprazole (PRILOSEC) 20 MG DR capsule; Take 1 capsule (20 mg) by mouth " daily    Essential hypertension  -     lisinopril (ZESTRIL) 20 MG tablet; Take 1 tablet (20 mg) by mouth daily  -     Comprehensive metabolic panel; Future    Osteopenia of hip, unspecified laterality  She is not interested in further dexa testing or treatment.    Abnormal finding of blood chemistry, unspecified   -     Hemoglobin A1c; Future        Noemi Collins MD  Internal Medicine

## 2021-07-15 NOTE — PROGRESS NOTES
Medicare Annual Wellness Visit Previsit note    This 80 year old year old female presents for a  Medicare Wellness Exam.         Medical Care     Have you been to an ER or a hospital in the last year? The patient did not answer this question.   What other specialists or organizations are involved in your medical care?  Ophtamologist  Current providers sharing in care for this patient include:  Patient Care Team       Relationship Specialty Notifications Start End    Ana María Rincon MD PCP - General Internal Medicine  12/23/11     Karma Bustillos MD MD Ophthalmology  3/11/14     Phone: [phone] Fax: [phone]         [facility] [address] [facility] [address]    Karina Barros MD MD Internal Medicine  4/29/15     primary care clinic    Phone: [phone] Pager: [phone] Fax: [phone] [address] [facility] [address] [facility] [address]    Cami Hearn MD MD OB/Gyn  5/18/15     Phone: [phone] Fax: [phone]         [address]  [facility] [address]    Adelina Prado MD MD Dermatology  6/26/15     Phone: [phone] Fax: [phone]         [address] [facility] [address] [facility] [address]    Negro Williamson MD MD Orthopedics  4/18/16     Phone: [phone] Fax: [phone]         [address] [facility] [address]    Noemi Collins MD Assigned PCP   5/27/21     Phone: [phone] Fax: [phone]         [address] [facility] [address] [place] [facility] [address]                 Social History     Marital Status:  Who lives in your household? Patient did not answer this question.  Does your home have any of the following safety concerns? Loose rugs in the hallway, no grab bars in the bathroom, no handrails on the stairs or have poorly lit areas?  No  Do you feel threatened or controlled by a partner, ex-partner or anyone in your life? No  Has anyone hurt you physically, for example by pushing, hitting, slapping or  kicking you   or forcing you to have sex? No  Do you need help with the phone, transportation, shopping, preparing meals, housework, laundry, medications or managing money? No   Have you noticed any hearing difficulties? No      Risk Behaviors and Healthy Habits     How many servings of fruits and vegetables do you eat a day? 2  How often do you exercise and what do you do? 50 minutes 5 a week  Do you frequently ride without a seatbelt? No  Do you use tobacco?  No  Do you use any other drugs? No       Do you use alcohol? Yes  Number of drinking days a week 2       Sexual Health     Are you sexually active? No   Have you had any sexually transmitted infections? No  Any sexual concerns? No       FOR WOMEN ONLY  What year did you stop having periods? Patient did not answer this question.  Any vaginal bleeding in the last year? No  Have you ever had an abnormal Pap smear? No

## 2021-07-15 NOTE — PATIENT INSTRUCTIONS
Can try Magnesium oxide 400-800 mg at night for cramps.    Your bone density test showed that you have osteopenia, which means the bone strength is slightly reduced, but it is not in the category of osteoporosis.  This is good news.  The best things to keep the bones strong are avoiding tobacco and alcohol, getting regular weight-bearing exercise, and adequate intake of calcium and vitamin D.     Recommended total daily intake of calcium is 1200 mg daily, and 1000 IU of vitamin D.   If you do not eat 3-4 servings of calcium daily, it would be beneficial to take a calcium and vitamin D supplement to maintain bone strength. Examples of calcium rich foods include dairy (milk, yogurt, cheese), greens (kale, bok nessa), broccoli, oranges, seafood (sardines, shrimp, salmon) or fortified foods (almond milk, cereals, tofu).  Aim for 250-500 mg calcium and 9919-4866 vitamin D per day in a vitamin, more or less depending on your dietary intake.

## 2021-07-16 RX ORDER — SIMVASTATIN 40 MG
40 TABLET ORAL AT BEDTIME
Qty: 90 TABLET | Refills: 3 | Status: SHIPPED | OUTPATIENT
Start: 2021-07-16 | End: 2021-11-17

## 2021-07-19 ENCOUNTER — TELEPHONE (OUTPATIENT)
Dept: INTERNAL MEDICINE | Facility: CLINIC | Age: 81
End: 2021-07-19

## 2021-07-19 NOTE — TELEPHONE ENCOUNTER
[facility] Call Center    Phone Message    May a detailed message be left on voicemail: yes     Reason for Call: Medication Question or concern regarding medication   Prescription Clarification  Name of Medication:medication for cholesterol as discussed with Dr. Collins previously Prescribing Provider:Dennis    Pharmacy: [id number] [place] [id number] [facility] [address]   What on the order needs clarification? Patient initially declined this medication but patient states after seeing lab results she would like to begin taking the medication suggested for her cholesterol by Dr. Collins.           Action Taken: Message routed to:  [facility] pcc    Travel Screening: Not Applicable

## 2021-07-19 NOTE — TELEPHONE ENCOUNTER
Called patient.  Left message Rx Simvastatin was sent to pharmacy.        John Emerson CMA ([facility]) at 1:56 PM on 7/19/2021

## 2021-09-12 ENCOUNTER — HEALTH MAINTENANCE LETTER (OUTPATIENT)
Age: 81
End: 2021-09-12

## 2021-10-05 ENCOUNTER — OFFICE VISIT (OUTPATIENT)
Dept: INTERNAL MEDICINE | Facility: CLINIC | Age: 81
End: 2021-10-05
Payer: MEDICARE

## 2021-10-05 VITALS
HEIGHT: 61 IN | DIASTOLIC BLOOD PRESSURE: 77 MMHG | OXYGEN SATURATION: 99 % | SYSTOLIC BLOOD PRESSURE: 171 MMHG | BODY MASS INDEX: 24.2 KG/M2 | WEIGHT: 128.2 LBS | HEART RATE: 55 BPM | RESPIRATION RATE: 16 BRPM

## 2021-10-05 DIAGNOSIS — E03.8 OTHER SPECIFIED HYPOTHYROIDISM: ICD-10-CM

## 2021-10-05 DIAGNOSIS — I10 ESSENTIAL HYPERTENSION, BENIGN: ICD-10-CM

## 2021-10-05 DIAGNOSIS — E78.49 OTHER HYPERLIPIDEMIA: ICD-10-CM

## 2021-10-05 DIAGNOSIS — H26.9 CATARACT OF LEFT EYE, UNSPECIFIED CATARACT TYPE: Primary | ICD-10-CM

## 2021-10-05 PROCEDURE — 99214 OFFICE O/P EST MOD 30 MIN: CPT | Mod: GC | Performed by: STUDENT IN AN ORGANIZED HEALTH CARE EDUCATION/TRAINING PROGRAM

## 2021-10-05 ASSESSMENT — PAIN SCALES - GENERAL: PAINLEVEL: NO PAIN (0)

## 2021-10-05 ASSESSMENT — MIFFLIN-ST. JEOR: SCORE: 973.89

## 2021-10-05 NOTE — NURSING NOTE
Marli Ponce is a 81 year old female patient that presents today in clinic for the following:    Chief Complaint   Patient presents with     Pre-Op Exam     Patient comes for pre op exam for left eye cataract surgery.      The patient's allergies and medications were reviewed as noted. A set of vitals were recorded as noted without incident. The patient does not have any other questions for the provider.    Luan Juarez, EMT at 10:03 AM on 10/5/2021

## 2021-10-05 NOTE — PROGRESS NOTES
Marli Ponce presents on 10/5/2021 for a pre-operative exam.    Patient Name: Marli Ponce  Location of Surgery: [facility]  Surgeon: Dr. Sae Naidu  Type of Surgery or Procedure: left eye cataract surgery  Date and Time of Surgery or Procedure: 1171 0/12/12  Where are you planning to recover after surgery:     (X) at home with family   ( ) at home alone   ( ) at home with home care   ( ) at a nursing home   ( ) at a TCU (Transitional Care Unit)   ( ) at a rehab center   ( ) other:     Pre-Operative Exam Questions  1. Have you ever had a heart attack or stroke? No  2. Have you ever had surgery on your heart or blood vessels, such as a stent, coronary bypass, or surgery on an artery in the head, neck, heart, or legs? No  3. Do you have chest pain when you're physically active? No  4. Do you have a history of heart failure? No  5. Do you currently have a cold, bronchitis, or symptoms of other respiratory (head and chest) infections? No  6. Do you have a cough, shortness of breath, or wheezing? No  7. Do you or anyone in your family have a history of blood clots/ No  8. Do you or anyone in your family have a serious bleeding problem, such as long-lasting bleeding after surgeries or cuts? No  9. Have you ever had anemia or been told to take iron pills? No  10. Have you had an abnormal blood loss such as black, tarry or bloody stools, or abnormal vaginal bleeding? No  11. Have you ever had a blood infusion No  12. Are you willing to have a blood transfusion if it is medically needed before, during, or after your surgery? Yes  13. Have you or anyone in your family ever had problems with anesthesia or sedation? No  14. Do you have sleep apnea, excessive snoring, or daytime drowsiness? No  15. Do you have any artificial heart values or other implanted medical devices, such as a pacemaker, defibrillator, or continuous glucose monitor? No  16. Do you have any artificial joints? No  17. Are you allergic to  latex? No  18. Is there any chance that you may be pregnant? No    Luan Juarez, EMT at 9:54 AM on 10/5/2021

## 2021-10-05 NOTE — PROGRESS NOTES
PRE-OP EVALUATION:  [facility]  Internal Medicine     Patient Name: Marli Ponce  Location of Surgery: [facility]  Surgeon: Dr. Sae Naidu  Type of Surgery or Procedure: left eye cataract surgery  Date and Time of Surgery or Procedure: 1171 0/12/12    HPI:      Marli Ponce 81 year old female with PMHx significant for HTN, HLD, hypothyroidism, and GERD who presents today at the request of Marlidarci Ponce for preoperative cardiopulmonary risk assessment for the following intended procedure: left cataract surgery.     Type of anesthesia anticipated: MAC    Pertinent history: Patient is scheduled for left cataract surgery.     Cardiac risks: No history of cardiac disease. No recent chest pain, shortness of breath, or palpitations.   Pulmonary risks: No history of lung disease. No recent shortness of breath, cough, fever, or cold symptoms.   Exercise tolerance: 1hr of aerobic per day for 5 days a week without symptoms. Can climb >2 flights of steps without stopping. MET >4.   Personal history of bleeding tendencies/disorders: No  Family history of bleeding tendencies/disorders: No   Personal history of adverse anesthesia reactions: No   Family history of adverse anesthesia reactions: No   Personal history of unanticipated surgical complications: No                                                                                                                                                        .  MEDICAL HISTORY:     Patient Active Problem List   Diagnosis     Hyperlipidemia     Hypothyroidism     Disorder of bone and cartilage     Essential hypertension, benign     Hip pain     Chronic gastritis     Transient amnesia     Primary open angle glaucoma (POAG) of both eyes, moderate stage     Primary osteoarthritis of right hip     Low bone density     ACP (advance care planning)     Myofascial pain     Tension-type headache     Past Surgical History:   Procedure Laterality Date      "CHOLECYSTECTOMY       LAPAROSCOPIC APPENDECTOMY       LASER IRIDOTOMY OD (RIGHT EYE)      RE/LE  pre      LASER IRIDOTOMY OS (LEFT EYE)  2010     SELECTIVE LASER TRABECULOPLASTY (SLT) OS (LEFT EYE)  2010    LE     TRABECULECTOMY, MITOMYCIN FILTER, COMBINED  1/10/2012    LE     Family History   Problem Relation Age of Onset     Cancer Mother 78        stomach and  at 80     Hypertension Mother      Glaucoma Mother      Breast Cancer Sister 38        still living     Hypertension Father      Cerebrovascular Disease Father 52     Glaucoma Maternal Grandfather      Social History     Tobacco Use     Smoking status: Never Smoker     Smokeless tobacco: Never Used   Substance Use Topics     Alcohol use: Yes     Alcohol/week: 0.0 standard drinks     Comment: rare tequilla     Drug use: No       Current Outpatient Medications   Medication Sig Dispense Refill     levothyroxine (SYNTHROID/LEVOTHROID) 50 MCG tablet Take 1 tablet (50 mcg) by mouth daily 90 tablet 3     lisinopril (ZESTRIL) 20 MG tablet Take 1 tablet (20 mg) by mouth daily 90 tablet 3     Multiple Vitamin (MULTIVITAMIN) per tablet Take 1 tablet by mouth daily.       omeprazole (PRILOSEC) 20 MG DR capsule Take 1 capsule (20 mg) by mouth daily 90 capsule 3     travoprost BAK FREE (TRAVATAN Z) 0.004 % ophthalmic solution Place 1 drop into the right eye At Bedtime Call clinic to schedule follow up appointment. For additional refills, please schedule a follow-up appointment at [phone].  Past due for appt. 2.5 mL 1     ASPIRIN 81 PO Take 81 mg by mouth daily (Patient not taking: Reported on 10/5/2021)       Polyvinyl Alcohol-Povidone (REFRESH OP) Apply to eye as needed       simvastatin (ZOCOR) 40 MG tablet Take 1 tablet (40 mg) by mouth At Bedtime (Patient not taking: Reported on 10/5/2021) 90 tablet 3     No Known Allergies    Latex Allergy: NO    REVIEW OF SYSTEMS:   Pertinent questions are noted below.  Answers are \"NO\" unless otherwise " "noted:    1.  - Do you have a history of heart attack, stroke, stent, bypass or surgery on an artery in the head, neck, heart or legs?  2.  - Do you ever have any pain or discomfort in your chest?   3. - Do you have a history of  Heart Failure?  4. - Are you troubled by shortness of breath when: walking on the level, up a slight hill or at night?  5. - Do you currently have a cold, bronchitis or other respiratory infection?  6. - Do you have a cough, shortness of breath or wheezing?  7. - Do you sometimes get pains in the calves of your legs when you walk?  8. - Do you or anyone in your family have previous history of blood clots?  9. - Do you or does anyone in your family have a serious bleeding problem such as prolonged bleeding following surgeries or cuts?  10. - Have you ever had problems with anemia or been told to take iron pills?  11. - Have you had any abnormal blood loss such as black, tarry or bloody stools, or abnormal vaginal bleeding?  12. - Have you ever had a blood transfusion?  13. - Have you or any of your relatives ever had problems with anesthesia?  14. - Do you have sleep apnea, excessive snoring or daytime drowsiness?  15. - Do you have any prosthetic heart valves?  16. - Do you have prosthetic joints?  17. - Is there any chance that you may be pregnant?     EXAM:   BP (!) 171/77 (BP Location: Right arm, Patient Position: Sitting, Cuff Size: Adult Regular)   Pulse 55   Resp 16   Ht 1.549 m (5' 1\")   Wt 58.2 kg (128 lb 3.2 oz)   SpO2 99%   BMI 24.22 kg/m      General:  A&Ox3, NAD  Head: atraumatic  Eyes:  Pupils 2-3 mm, sclera white, EOM's full, conjunctiva moist, no periorbital swelling    Ears:  TM's normal, EAC's patent, clear of cerumen  Nose:  Nasal passages clear, turbinates not swollen  Throat/Mouth:  No pharyngeal erythema, exudate, ulcers, oral mucosa and tongue moist, normal hard and soft palate  Neck:  Trachea midline, Full AROM, supple, thyroid smooth, symmetric, not enlarged, " no nodules, no neck lymphadenopathy  Lungs:  Clear to auscultation throughout, no wheezes, rhonchi or rales. Normal respiratory effort and no intercostal retractions.  C/V:  Regular rate and rhythm, no murmurs, rubs or gallops.  No JVD, no carotid bruits. Radial and DP pulses 2+, equal and regular.  Abdomen:  Not distended.  Bowel sounds active.  No tenderness, no hepatosplenomegaly or masses.  No CVA tenderness or masses.  Lymph:  No cervical lymph nodes.  Neuro: Alert and oriented, face symmetric. Able to get on/off exam table without assistance.  Strength grossly intact. No tremor.  Gait steady.   M/S:   No joint deformities noted.  No joint swelling.  Skin:   Normal temperature., turgor and texture. No rashes, suspicious lesions, or jaundice on exposed skin surfaces.   Extremities:  No peripheral edema, no digital cyanosis  Psych:  Alert and oriented. Appropriate affect.  Not psychomotor slowed.  No signs of anxiety or agitation.    DIAGNOSTICS:   The following, pertinent test results were reviewed: CMP, Hemoglobin A1c, and TSH from 715 are all normal.     IMPRESSION:     The proposed surgical procedure is considered to be low risk.  Patient has a low cardiovascular risk and a low pulmonary risk profile      The patient has the following additional risks for perioperative complications:  none.    Diagnoses for the visit today:  1. Preoperative assessment for cataract surgery  2. HTN  3. HLD  4. Hypothyroidism      RECOMMENDATIONS:     Marli Ponce may proceed with proposed procedure, with the following diagnositic tests and/or specialist consultation(s): None     Instructions for home medications abbe-operatively discussed with patient verbally and in writing: continue all medications day of surgery     Routine follow up with the patient's primary care provider is advised    Musa Acuña MD  Internal Medicine, PGY3

## 2021-10-11 ENCOUNTER — TELEPHONE (OUTPATIENT)
Dept: INTERNAL MEDICINE | Facility: CLINIC | Age: 81
End: 2021-10-11

## 2021-10-11 NOTE — TELEPHONE ENCOUNTER
[facility] Call Center    Phone Message    May a detailed message be left on voicemail: no     Reason for Call: Other: Bessy calling from [facility] regarding patients upcoming surgery on 10/12/21. Patients pre op information is not signed in epic and has not been sent to the surgery center, Please have provider sign and fax to surgery center as soon as possible as patients surgery is on 10/12/21 and pre op was done on 10/5/21. Please fax to [phone] and call with questions thank you.      Action Taken: Message routed to:  [facility] ([facility] pcc    Travel Screening: Not Applicable

## 2021-10-12 NOTE — TELEPHONE ENCOUNTER
Provider was contacted, per-op signed and faxed to number listed below. Masha Ye LPN 10/12/2021 9:18 AM

## 2021-11-02 ENCOUNTER — PRE VISIT (OUTPATIENT)
Dept: NEUROSURGERY | Facility: CLINIC | Age: 81
End: 2021-11-02

## 2021-11-02 NOTE — TELEPHONE ENCOUNTER
SPINE PATIENTS - NEW PROTOCOL PREVISIT    RECORDS RECEIVED FROM: Self (pt  prev. Pt of Dr. Hernandez's)   REASON FOR VISIT: Back pain, Lumbar Radiculopathy    Date of Appt: TBD   NOTES (FOR ALL VISITS) STATUS DETAILS   OFFICE NOTE from referring provider N/A    OFFICE NOTE from other specialist N/A    DISCHARGE SUMMARY from hospital N/A    DISCHARGE REPORT from ER N/A    EMG REPORT N/A    MEDICATION LIST Internal    IMAGING  (FOR ALL VISITS)     MRI (HEAD, NECK, SPINE) Internal [facility]:  MRI Lumbar Spine 11/7/21   XRAY (SPINE) *NEUROSURGERY* Internal [facility]:  XR EOS Total Body 11/9/21   CT (HEAD, NECK, SPINE) N/A       Action 11/2/21 MV 10.50am   Action Taken Called and LVM to see if pt has outside imaging and recs. Left my call back number [phone].    --11/3/21 MV 6.43am--  Received VM from Dr. Ponce (pt's ) stating there are no records and no current imaging. Patient saw Dr. Negro Williamson at  [facility] 2 yrs ago and had xrays of her hip (no spine).

## 2021-11-03 ENCOUNTER — TELEPHONE (OUTPATIENT)
Dept: NEUROSURGERY | Facility: CLINIC | Age: 81
End: 2021-11-03

## 2021-11-03 DIAGNOSIS — M54.9 BACK PAIN: Primary | ICD-10-CM

## 2021-11-03 DIAGNOSIS — M54.10 RADICULOPATHY: ICD-10-CM

## 2021-11-07 ENCOUNTER — ANCILLARY PROCEDURE (OUTPATIENT)
Dept: MRI IMAGING | Facility: CLINIC | Age: 81
End: 2021-11-07
Attending: NEUROLOGICAL SURGERY
Payer: MEDICARE

## 2021-11-07 DIAGNOSIS — M54.10 RADICULOPATHY: ICD-10-CM

## 2021-11-07 DIAGNOSIS — M54.9 BACK PAIN: ICD-10-CM

## 2021-11-07 PROCEDURE — G1004 CDSM NDSC: HCPCS | Mod: GC | Performed by: RADIOLOGY

## 2021-11-07 PROCEDURE — 72148 MRI LUMBAR SPINE W/O DYE: CPT | Mod: MG | Performed by: RADIOLOGY

## 2021-11-09 ENCOUNTER — OFFICE VISIT (OUTPATIENT)
Dept: NEUROSURGERY | Facility: CLINIC | Age: 81
End: 2021-11-09

## 2021-11-09 ENCOUNTER — ANCILLARY PROCEDURE (OUTPATIENT)
Dept: GENERAL RADIOLOGY | Facility: CLINIC | Age: 81
End: 2021-11-09
Attending: NEUROLOGICAL SURGERY
Payer: MEDICARE

## 2021-11-09 ENCOUNTER — OFFICE VISIT (OUTPATIENT)
Dept: NEUROSURGERY | Facility: CLINIC | Age: 81
End: 2021-11-09
Payer: MEDICARE

## 2021-11-09 VITALS
HEIGHT: 61 IN | HEART RATE: 65 BPM | BODY MASS INDEX: 24.35 KG/M2 | WEIGHT: 129 LBS | RESPIRATION RATE: 16 BRPM | DIASTOLIC BLOOD PRESSURE: 74 MMHG | OXYGEN SATURATION: 98 % | SYSTOLIC BLOOD PRESSURE: 173 MMHG

## 2021-11-09 DIAGNOSIS — M54.16 LUMBAR RADICULOPATHY: Primary | ICD-10-CM

## 2021-11-09 DIAGNOSIS — M54.9 BACK PAIN: ICD-10-CM

## 2021-11-09 DIAGNOSIS — M54.10 RADICULOPATHY: Primary | ICD-10-CM

## 2021-11-09 PROCEDURE — 77073 BONE LENGTH STUDIES: CPT | Performed by: STUDENT IN AN ORGANIZED HEALTH CARE EDUCATION/TRAINING PROGRAM

## 2021-11-09 PROCEDURE — 99207 PR NO DOCUMENTATION ON VISIT: CPT | Performed by: NEUROLOGICAL SURGERY

## 2021-11-09 PROCEDURE — 72082 X-RAY EXAM ENTIRE SPI 2/3 VW: CPT | Performed by: STUDENT IN AN ORGANIZED HEALTH CARE EDUCATION/TRAINING PROGRAM

## 2021-11-09 PROCEDURE — 99203 OFFICE O/P NEW LOW 30 MIN: CPT | Performed by: NEUROLOGICAL SURGERY

## 2021-11-09 ASSESSMENT — MIFFLIN-ST. JEOR: SCORE: 977.52

## 2021-11-09 ASSESSMENT — PAIN SCALES - GENERAL: PAINLEVEL: MILD PAIN (2)

## 2021-11-09 NOTE — LETTER
11/9/2021       RE: Marli Ponce  [address] [address]     Dear Colleague,    Thank you for referring your patient, Marli Ponce, to the [facility] NEUROSURGERY [facility]INIC [facility]. Please see a copy of my visit note below.    Additional notes in a separate note. AMP      Again, thank you for allowing me to participate in the care of your patient.      Sincerely,    Elza Hernandez MD

## 2021-11-09 NOTE — PROGRESS NOTES
"  Neurosurgery Clinic      Name: Marli Ponce  : 1940  2021      Reason for visit: LOW BACK PAIN  History of Present Illness:   Marli Ponce is a 81 year old female who presents today for evaluation of low back pain radiating to her left leg. She complained of chronic low back pain but was exacerbated 2 weeks back after some heavy weight lifting. Pain is described as cramping pain starting from her low back radiating to left leg posterior aspect till her ankles.she has no complaints suggestive of neurogenic claudication, no sensory complaints and no bowel bladder involvement. Back pain has no aggravating or relieving factor.    She was last seen for back and hip pain by Dr. Williamson on 2019 with plan to pursue physical therapy.         Review of Systems:   Pertinent items are noted in HPI, remainder of complete ROS is negative.      Physical Exam:   BP (!) 173/74   Pulse 65   Resp 16   Ht 1.549 m (5' 1\")   Wt 58.5 kg (129 lb)   SpO2 98%   BMI 24.37 kg/m    General: Awake and alert and in no acute distress.  Pulm: Breathing comfortably on room air  CN: Symmetric browlift, smile, tongue protrusion, palate elevation, and sternocleidomastoids. No dysarthria. Extraocular muscles are all intact. Pupils react bilaterally and equally  Coordination: Intact finger-nose-finger bilaterally. Symmetric rapid alternating movements in bilateral upper extremities   Motor: No pronator drift. Good muscle bulk throughout. 5 out of 5 strength in bilateral upper and lower extremities except for left Extensor Hallucis Longus weakness__L5 LEVEL.  Sensation: Sensation grossly intact to light touch in all extremities.  Gait: Intact tandem gait.  Reflexes: 2+ reflexes bilateral biceps, brachioradialis, and patellar tendons and ankle reflex        Review of studies:  We reviewed her recent MRI Lumbar Spine without gadolinium (2021). This demonstrates:  1.  Progression of multilevel lumbar spondylosis, with " degenerative  changes with degenerative scoliosis and multi level disc bulges most prominent at L4-5 left sided disc bulge where there is moderate spinal canal and left lateral foraminal narrowing.  2.  Moderate to advanced bulky facet hypertrophy L4-S1.    Reading per radiology. I independently reviewed the imaging studies and results were discussed with the patient.       Impression and Plan:  Marli Ponce is a 81 year old female with possible left L5 radiculopathy. But plan for conservative management with physiotherapy and pain  Medications. Radiological imaging and clinically correlating did not suggest radiculopathy thus did not warrant surgical intervention. Trial of conservative management will be tried first.      Daphnie Latif  PGY-1 intern  Surgery    Plan discussed with Dr Hernandez, Neurosurgery     Elza Hernandez MD  Department of Neurosurgery  [facility]      Scribe Disclosure:  Scribe Preparation Attestation:  I, Noemi Turcios, a scribe, prepared the chart for today's encounter.

## 2021-11-09 NOTE — PATIENT INSTRUCTIONS
Dr Hernandez has referred you to Dr [facility] for medical spine management.  If your symptoms do not improve within 6-8 weeks, please call [phone] and make an appointment with Dr Hernandez to discuss further treatment options.

## 2021-11-09 NOTE — LETTER
"2021       RE: Marli Ponce  [address] [address]     Dear Colleague,    Thank you for referring your patient, Marli Ponce, to the [facility] NEUROSURGERY CLINIC [place] [facility]. Please see a copy of my visit note below.      Neurosurgery Clinic      Name: Marli Ponce  : 1940  2021      Reason for visit: LOW BACK PAIN  History of Present Illness:   Marli Ponce is a 81 year old female who presents today for evaluation of low back pain radiating to her left leg. She complained of chronic low back pain but was exacerbated 2 weeks back after some heavy weight lifting. Pain is described as cramping pain starting from her low back radiating to left leg posterior aspect till her ankles.she has no complaints suggestive of neurogenic claudication, no sensory complaints and no bowel bladder involvement. Back pain has no aggravating or relieving factor.    She was last seen for back and hip pain by Dr. Williamson on 2019 with plan to pursue physical therapy.         Review of Systems:   Pertinent items are noted in HPI, remainder of complete ROS is negative.      Physical Exam:   BP (!) 173/74   Pulse 65   Resp 16   Ht 1.549 m (5' 1\")   Wt 58.5 kg (129 lb)   SpO2 98%   BMI 24.37 kg/m    General: Awake and alert and in no acute distress.  Pulm: Breathing comfortably on room air  CN: Symmetric browlift, smile, tongue protrusion, palate elevation, and sternocleidomastoids. No dysarthria. Extraocular muscles are all intact. Pupils react bilaterally and equally  Coordination: Intact finger-nose-finger bilaterally. Symmetric rapid alternating movements in bilateral upper extremities   Motor: No pronator drift. Good muscle bulk throughout. 5 out of 5 strength in bilateral upper and lower extremities except for left Extensor Hallucis Longus weakness__L5 LEVEL.  Sensation: Sensation grossly intact to light touch " in all extremities.  Gait: Intact tandem gait.  Reflexes: 2+ reflexes bilateral biceps, brachioradialis, and patellar tendons and ankle reflex        Review of studies:  We reviewed her recent MRI Lumbar Spine without gadolinium (11/17/2021). This demonstrates:  1.  Progression of multilevel lumbar spondylosis, with degenerative  changes with degenerative scoliosis and multi level disc bulges most prominent at L4-5 left sided disc bulge where there is moderate spinal canal  and left lateral foraminal narrowing.  2.  Moderate to advanced bulky facet hypertrophy L4-S1.  Reading per radiology. I independently reviewed the imaging studies and results were discussed with the patient.       Impression and Plan:  Marli Ponce is a 81 year old female with possible left L5 radiculopathy       Elza Hernandez MD  Department of Neurosurgery  [facility]      Scribe Disclosure:  Scribe Preparation Attestation:  I, Noemi Turcios, a scribe, prepared the chart for today's encounter.        Again, thank you for allowing me to participate in the care of your patient.      Sincerely,    Elza Hernandez MD

## 2021-11-17 ENCOUNTER — OFFICE VISIT (OUTPATIENT)
Dept: NEUROSURGERY | Facility: CLINIC | Age: 81
End: 2021-11-17
Payer: MEDICARE

## 2021-11-17 VITALS
DIASTOLIC BLOOD PRESSURE: 76 MMHG | HEART RATE: 68 BPM | WEIGHT: 129 LBS | HEIGHT: 61 IN | SYSTOLIC BLOOD PRESSURE: 154 MMHG | BODY MASS INDEX: 24.35 KG/M2

## 2021-11-17 DIAGNOSIS — M54.16 LUMBAR RADICULOPATHY: ICD-10-CM

## 2021-11-17 DIAGNOSIS — M79.18 MYOFASCIAL PAIN: Primary | ICD-10-CM

## 2021-11-17 PROCEDURE — 99204 OFFICE O/P NEW MOD 45 MIN: CPT | Performed by: PREVENTIVE MEDICINE

## 2021-11-17 ASSESSMENT — PAIN SCALES - GENERAL: PAINLEVEL: MILD PAIN (3)

## 2021-11-17 ASSESSMENT — MIFFLIN-ST. JEOR: SCORE: 977.52

## 2021-11-17 NOTE — NURSING NOTE
"Reason For Visit:   Chief Complaint   Patient presents with     Consult     Low back pain         Occupation: NA  Currently working? No.  Work status?  NA.    Sports: NA  Activities: exercise 1 hr             BP (!) 154/76   Pulse 68   Ht 1.549 m (5' 1\")   Wt 58.5 kg (129 lb)   BMI 24.37 kg/m        No Known Allergies    Current Outpatient Medications   Medication     ASPIRIN 81 PO     levothyroxine (SYNTHROID/LEVOTHROID) 50 MCG tablet     lisinopril (ZESTRIL) 20 MG tablet     Multiple Vitamin (MULTIVITAMIN) per tablet     omeprazole (PRILOSEC) 20 MG DR capsule     travoprost CARMELA FREE (TRAVATAN Z) 0.004 % ophthalmic solution     Polyvinyl Alcohol-Povidone (REFRESH OP)     simvastatin (ZOCOR) 40 MG tablet     No current facility-administered medications for this visit.         Darla Severin-Brown, ANGELIQUEN  "

## 2021-11-17 NOTE — PATIENT INSTRUCTIONS
It was a pleasure meeting you today Marli.  See the assessment and plan below for my thoughts and I am looking forward to seeing you in 1 month.  If you start having more leg pain, bowel or bladder problems, or weakness in your leg come back sooner.    ASSESSMENT: Marli Ponce is a 81 year old female who presents  today for new patient evaluation of: Low back pain with left leg symptoms.  Examination does not show any neurologic deficits at this time, and apparently her left EHL weakness has resolved spontaneously.  He does however have an active gluteal myofascial trigger point in the left gluteus medius      PLAN:  She is not interested in any injections, and at this point I will think any are indicated.  She is doing better just with time, and I think having her  massage her gluteal muscles may be all she needs.  I think a tincture of time is probably the best solution as well as home remedies of topical camphor and salicylate cream plus massage.  Check in 1 month

## 2021-11-17 NOTE — PROGRESS NOTES
"    SUBJECTIVE:  HPI:  Marli Ponce  Is a 81 year old female who presents today for new patient evaluation of low back pain and possible left L5 radiculopathy, upon referral from Dr. Hernandez.  Left EHL weakness was noted on her examination of 11/9/2021.  There is a history of chronic low back pain exacerbated 3 weeks ago with some heavy lifting radiating down her left leg to her ankle..    Marli clarifies that the onset of her left leg heavy sensation was after lifting a 25 pound box, but she has had \"hip\" pain for a couple of years, and when she saw Dr. Williamson in 2019 he ordered some physical therapy for that and it improved.  She is not interested in injections and at this point, Dr. Hernandez has indicated that surgery is not necessary, according to Marli.  She is however feeling quite a bit better since 2 weeks ago with no specific treatment.      SYMPTOMS WORSENED WITH housework    SYMPTOMS IMPROVED WITH heat and rest    Pain score, disability index score and diagram reviewed.  See questionnaire.      ROS:  Specifically negative for bowel/bladder dysfunction, balance changes, headache, leg pain/numbness/weakness, fevers, chills, night sweats, unexplained weight loss;  otherwise unremarkable.  She is taking calcium for her osteoporosis but no other medications.  She denies history of cancer or long-term steroid use.  She is adverse to the idea of injections.  Please see the patient's intake questionnaire from today for details.    Treatment to Date: None so far    MEDICATIONS:  Reviewed.    ALLERGIES:  Reviewed.     Reviewed past medical, surgical, and family history.    Pertinent for chronic gastritis, hypothyroidism, hypertension, right hip osteoarthritis, osteoporosis, open angle glaucoma.    SOCIAL HX: She and her  are originally from [place] but has been in the [place] for over [place] years.  Her  is a retired thoracic and heart surgeon from the [employer].  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  They have been  for 61 years, have 5 children, and multiple grandchildren and great-grandchildren.      OBJECTIVE:    --CONSTITUTIONAL:   No acute distress.  The patient is well nourished and well groomed.  --PSYCHIATRIC:  Appropriate mood and affect. The patient is awake, alert, oriented to person, place, time and answering questions appropriately with clear speech.    --SKIN:  Skin over the face, bilateral lower extremities, and posterior torso is clean, dry, intact without rashes.  Small scar on the lateral right knee from a laceration at age 1  --RESPIRATORY: Normal respiratory excursion and effort, and no dyspnea.   --GAIT:  is non-antalgic. Flat foot, heel and toe walking:  normal   .  Squat and rise   normal    .  --STANDING EXAMINATION:    Symmetry of spine/pelvis   unremarkable   except for scoliosis.      She showed me how she likes to do some standing torso rotation exercises.  If her left foot is internally rotated it causes her chief complaint to flare but if I externally rotate her left foot she can rotate to both sides without pain, implicating her gluteal muscles rather than her spine.  Range of motion full and painless in flexion, full with minor low back pain in extension, but negative Kemps test..   Standing flexion   negative   .    Jorje's sign   negative    .     Stork test   negative    .   --NEUROLOGICAL:     SENSATION to light touch is intact in bilateral thighs, lower legs and feet.   REFLEXES:  patellar 2+, and achilles 2+ left absent right.  Babinski is negative. No clonus.  MANUAL MOTOR TESTING:  L3- S1 Myotomes, Femoral, Obturator, Peroneal and Tibial nerves 5/5.  EHL is strong today on both sides.  DURAL STRETCH TESTS:  SLR negative but when I add ankle dorsiflexion plus slump test she gets some left lateral thigh pain..  Femoral Stretch Test deferred.   --PELVIC/HIP JOINTS:                Long Sitting   negative   .    Hip scour   negative   .    Hip Impingement    negative   .   LARRY   negative   right and slightly positive for Noncon cord and pain on the left..     Piriformis   negative   .   Spring testing negative SI joints.      PELVIC ALIGNMENT neutral.   --LUMBAR/GLUTEAL MUSCLES: She has a trigger point in her left gluteus medius and it felt good to have that massage and I taught her  how to do that.  Not however radiates symptoms into her leg.    --ABDOMINAL:  Non-distended.  Nontender  --VASCULAR: Femoral pulses palpable. Lower extremity capillary refill, temperature and color normal.         IMAGING: Images and reports reviewed.  MRI Lumbar Spine without gadolinium (11/17/2021).   Findings: Regarding numbering convention, there are 5 lumbar-type vertebrae assumed for the purposes of this dictation.  The tip of the conus medullaris is at  L1.  Regarding alignment, there is trace stepwise retrolisthesis L1-L5. Moderate progression of degenerative changes since comparison, mild disc space loss L5-S1 and moderate disc space loss and degenerative endplate changes L1-L5. Scattered Schmorl nodes, most prominent along the inferior endplate of L1. Thinning of the L5 pars interarticularis regions without definite spondylolysis. Regarding bone marrow signal intensity, no abnormality is visualized on STIR images.    1.  Progression of multilevel lumbar spondylosis, with degenerative changes with degenerative scoliosis and multi level disc bulges most prominent at L4-5 left sided disc bulge where there is moderate spinal canal and moderate left lateral foraminal narrowing.  2.  Moderate to advanced bulky facet hypertrophy L4-S1.    Exam: Full body radiographs using EOS 11/9/2021    Impression:  1. Mild convexed left curvature of the thoracolumbar/lumbar spine with apex at L3.  2. Multilevel spondylosis throughout the spine, most prominent in the lower thoracic through lumbar spine.  3. No substantial coronal or sagittal imbalance.  4. Weight bearing axis as detailed  above.          ASSESSMENT: Marli Ponce is a 81 year old female who presents  today for new patient evaluation of: Low back pain with left leg symptoms.  Examination does not show any neurologic deficits at this time, and apparently her left EHL weakness has resolved spontaneously.  He does however have an active gluteal myofascial trigger point in the left gluteus medius.  Maximum dural stretch test does reproduce some slight left leg symptoms suggesting that she does have some symptomatic left neural foraminal stenosis, but it is so minimal that I do not think we need to treat it.      PLAN:  She is not interested in any injections, and at this point I will think any are indicated.  She is doing better just with time, and I think having her  massage her gluteal muscles may be all she needs.  I think a tincture of time is probably the best solution as well as home remedies of topical camphor and salicylate cream plus massage.  Check in 1 month    I like to thank Dr. Elza Hernandez for the opportunity to participate in the care of this very pleasant patient.    Advised patient to call the [facility] if symptoms worsen or you have problems controlling bladder and bowel function or worsening leg weakness.     Please note: Voice recognition software was used in this dictation.  It may therefore contain typographical errors.    Woody Hogue MD

## 2021-11-17 NOTE — LETTER
"    11/17/2021         RE: Marli Ponce  [address] [facility] [address]        Dear Colleague,    Thank you for referring your patient, Marli Ponce, to the [facility] [place]. Please see a copy of my visit note below.        SUBJECTIVE:  HPI:  Marli Ponce  Is a 81 year old female who presents today for new patient evaluation of low back pain and possible left L5 radiculopathy, upon referral from Dr. Hernandez.  Left EHL weakness was noted on her examination of 11/9/2021.  There is a history of chronic low back pain exacerbated 3 weeks ago with some heavy lifting radiating down her left leg to her ankle..    Marli clarifies that the onset of her left leg heavy sensation was after lifting a 25 pound box, but she has had \"hip\" pain for a couple of years, and when she saw Dr. Williamson in 2019 he ordered some physical therapy for that and it improved.  She is not interested in injections and at this point, Dr. Hernandez has indicated that surgery is not necessary, according to Marli.  She is however feeling quite a bit better since 2 weeks ago with no specific treatment.      SYMPTOMS WORSENED WITH housework    SYMPTOMS IMPROVED WITH heat and rest    Pain score, disability index score and diagram reviewed.  See questionnaire.      ROS:  Specifically negative for bowel/bladder dysfunction, balance changes, headache, leg pain/numbness/weakness, fevers, chills, night sweats, unexplained weight loss;  otherwise unremarkable.  She is taking calcium for her osteoporosis but no other medications.  She denies history of cancer or long-term steroid use.  She is adverse to the idea of injections.  Please see the patient's intake questionnaire from today for details.    Treatment to Date: None so far    MEDICATIONS:  Reviewed.    ALLERGIES:  Reviewed.     Reviewed past medical, surgical, and family history.    Pertinent for chronic gastritis, hypothyroidism, hypertension, right hip " osteoarthritis, osteoporosis, open angle glaucoma.    SOCIAL HX: She and her  are originally from [facility] [facility] but has been in the [place] for over [place] years.  Her  is a retired thoracic and heart surgeon from the [employer].  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  They have been  for 61 years, have 5 children, and multiple grandchildren and great-grandchildren.      OBJECTIVE:    --CONSTITUTIONAL:   No acute distress.  The patient is well nourished and well groomed.  --PSYCHIATRIC:  Appropriate mood and affect. The patient is awake, alert, oriented to person, place, time and answering questions appropriately with clear speech.    --SKIN:  Skin over the face, bilateral lower extremities, and posterior torso is clean, dry, intact without rashes.  Small scar on the lateral right knee from a laceration at age 1  --RESPIRATORY: Normal respiratory excursion and effort, and no dyspnea.   --GAIT:  is non-antalgic. Flat foot, heel and toe walking:  normal   .  Squat and rise   normal    .  --STANDING EXAMINATION:    Symmetry of spine/pelvis   unremarkable   except for scoliosis.      She showed me how she likes to do some standing torso rotation exercises.  If her left foot is internally rotated it causes her chief complaint to flare but if I externally rotate her left foot she can rotate to both sides without pain, implicating her gluteal muscles rather than her spine.  Range of motion full and painless in flexion, full with minor low back pain in extension, but negative Kemps test..   Standing flexion   negative   .    Jorje's sign   negative    .     Stork test   negative    .   --NEUROLOGICAL:     SENSATION to light touch is intact in bilateral thighs, lower legs and feet.   REFLEXES:  patellar 2+, and achilles 2+ left absent right.  Babinski is negative. No clonus.  MANUAL MOTOR TESTING:  L3- S1 Myotomes, Femoral, Obturator, Peroneal and Tibial nerves 5/5.  EHL is strong today on both  sides.  DURAL STRETCH TESTS:  SLR negative but when I add ankle dorsiflexion plus slump test she gets some left lateral thigh pain..  Femoral Stretch Test deferred.   --PELVIC/HIP JOINTS:                Long Sitting   negative   .    Hip scour   negative   .    Hip Impingement   negative   .   LARRY   negative   right and slightly positive for Noncon cord and pain on the left..     Piriformis   negative   .   Spring testing negative SI joints.      PELVIC ALIGNMENT neutral.   --LUMBAR/GLUTEAL MUSCLES: She has a trigger point in her left gluteus medius and it felt good to have that massage and I taught her  how to do that.  Not however radiates symptoms into her leg.    --ABDOMINAL:  Non-distended.  Nontender  --VASCULAR: Femoral pulses palpable. Lower extremity capillary refill, temperature and color normal.         IMAGING: Images and reports reviewed.  MRI Lumbar Spine without gadolinium (11/17/2021).   Findings: Regarding numbering convention, there are 5 lumbar-type vertebrae assumed for the purposes of this dictation.  The tip of the conus medullaris is at  L1.  Regarding alignment, there is trace stepwise retrolisthesis L1-L5. Moderate progression of degenerative changes since comparison, mild disc space loss L5-S1 and moderate disc space loss and degenerative endplate changes L1-L5. Scattered Schmorl nodes, most prominent along the inferior endplate of L1. Thinning of the L5 pars interarticularis regions without definite spondylolysis. Regarding bone marrow signal intensity, no abnormality is visualized on STIR images.    1.  Progression of multilevel lumbar spondylosis, with degenerative changes with degenerative scoliosis and multi level disc bulges most prominent at L4-5 left sided disc bulge where there is moderate spinal canal and moderate left lateral foraminal narrowing.  2.  Moderate to advanced bulky facet hypertrophy L4-S1.    Exam: Full body radiographs using EOS 11/9/2021    Impression:  1.  Mild convexed left curvature of the thoracolumbar/lumbar spine with apex at L3.  2. Multilevel spondylosis throughout the spine, most prominent in the lower thoracic through lumbar spine.  3. No substantial coronal or sagittal imbalance.  4. Weight bearing axis as detailed above.          ASSESSMENT: Marli Ponce is a 81 year old female who presents  today for new patient evaluation of: Low back pain with left leg symptoms.  Examination does not show any neurologic deficits at this time, and apparently her left EHL weakness has resolved spontaneously.  He does however have an active gluteal myofascial trigger point in the left gluteus medius.  Maximum dural stretch test does reproduce some slight left leg symptoms suggesting that she does have some symptomatic left neural foraminal stenosis, but it is so minimal that I do not think we need to treat it.      PLAN:  She is not interested in any injections, and at this point I will think any are indicated.  She is doing better just with time, and I think having her  massage her gluteal muscles may be all she needs.  I think a tincture of time is probably the best solution as well as home remedies of topical camphor and salicylate cream plus massage.  Check in 1 month    I like to thank Dr. Elza Hernandez for the opportunity to participate in the care of this very pleasant patient.    Advised patient to call the [facility] if symptoms worsen or you have problems controlling bladder and bowel function or worsening leg weakness.     Please note: Voice recognition software was used in this dictation.  It may therefore contain typographical errors.    Woody Hogue MD             Again, thank you for allowing me to participate in the care of your patient.        Sincerely,        Woody Hogue MD

## 2022-07-09 DIAGNOSIS — I10 ESSENTIAL HYPERTENSION: ICD-10-CM

## 2022-07-13 RX ORDER — LISINOPRIL 20 MG/1
20 TABLET ORAL DAILY
Qty: 90 TABLET | Refills: 0 | Status: SHIPPED | OUTPATIENT
Start: 2022-07-13 | End: 2022-07-18

## 2022-07-13 NOTE — TELEPHONE ENCOUNTER
lisinopril (ZESTRIL) 20 MG tablet   Take 1 tablet (20 mg) by mouth daily      Last Written Prescription Date:  7/15/21  Last Fill Quantity: 90,   # refills: 3  Last Office Visit : 10/5/21  Future Office visit:  7/18/22      BP > 140/90 on 11/17/21 = 154/76. Per protocol, 90 day to pharmacy. Future appointment 7/18/22.     11/17/21 (!) 154/76

## 2022-07-18 ENCOUNTER — ANCILLARY PROCEDURE (OUTPATIENT)
Dept: MAMMOGRAPHY | Facility: CLINIC | Age: 82
End: 2022-07-18
Attending: INTERNAL MEDICINE
Payer: MEDICARE

## 2022-07-18 ENCOUNTER — LAB (OUTPATIENT)
Dept: LAB | Facility: CLINIC | Age: 82
End: 2022-07-18
Payer: MEDICARE

## 2022-07-18 ENCOUNTER — OFFICE VISIT (OUTPATIENT)
Dept: INTERNAL MEDICINE | Facility: CLINIC | Age: 82
End: 2022-07-18
Payer: MEDICARE

## 2022-07-18 VITALS
OXYGEN SATURATION: 98 % | WEIGHT: 125 LBS | RESPIRATION RATE: 16 BRPM | SYSTOLIC BLOOD PRESSURE: 148 MMHG | DIASTOLIC BLOOD PRESSURE: 71 MMHG | HEART RATE: 85 BPM | HEIGHT: 61 IN | BODY MASS INDEX: 23.6 KG/M2

## 2022-07-18 DIAGNOSIS — E78.49 OTHER HYPERLIPIDEMIA: ICD-10-CM

## 2022-07-18 DIAGNOSIS — Z00.00 LABORATORY EXAMINATION ORDERED AS PART OF A ROUTINE GENERAL MEDICAL EXAMINATION: Primary | ICD-10-CM

## 2022-07-18 DIAGNOSIS — E03.9 HYPOTHYROIDISM, UNSPECIFIED TYPE: ICD-10-CM

## 2022-07-18 DIAGNOSIS — Z00.00 LABORATORY EXAMINATION ORDERED AS PART OF A ROUTINE GENERAL MEDICAL EXAMINATION: ICD-10-CM

## 2022-07-18 DIAGNOSIS — I10 ESSENTIAL HYPERTENSION, BENIGN: ICD-10-CM

## 2022-07-18 DIAGNOSIS — K21.9 GASTROESOPHAGEAL REFLUX DISEASE WITHOUT ESOPHAGITIS: ICD-10-CM

## 2022-07-18 DIAGNOSIS — Z00.00 ENCOUNTER FOR MEDICARE ANNUAL WELLNESS EXAM: ICD-10-CM

## 2022-07-18 DIAGNOSIS — I10 ESSENTIAL HYPERTENSION: ICD-10-CM

## 2022-07-18 DIAGNOSIS — Z12.31 VISIT FOR SCREENING MAMMOGRAM: ICD-10-CM

## 2022-07-18 LAB
ALBUMIN SERPL-MCNC: 3.4 G/DL (ref 3.4–5)
ALP SERPL-CCNC: 73 U/L (ref 40–150)
ALT SERPL W P-5'-P-CCNC: 22 U/L (ref 0–50)
ANION GAP SERPL CALCULATED.3IONS-SCNC: <1 MMOL/L (ref 3–14)
AST SERPL W P-5'-P-CCNC: 20 U/L (ref 0–45)
BILIRUB SERPL-MCNC: 0.8 MG/DL (ref 0.2–1.3)
BUN SERPL-MCNC: 12 MG/DL (ref 7–30)
CALCIUM SERPL-MCNC: 9 MG/DL (ref 8.5–10.1)
CHLORIDE BLD-SCNC: 108 MMOL/L (ref 94–109)
CHOLEST SERPL-MCNC: 238 MG/DL
CO2 SERPL-SCNC: 27 MMOL/L (ref 20–32)
CREAT SERPL-MCNC: 0.88 MG/DL (ref 0.52–1.04)
ERYTHROCYTE [DISTWIDTH] IN BLOOD BY AUTOMATED COUNT: 12 % (ref 10–15)
FASTING STATUS PATIENT QL REPORTED: YES
GFR SERPL CREATININE-BSD FRML MDRD: 65 ML/MIN/1.73M2
GLUCOSE BLD-MCNC: 96 MG/DL (ref 70–99)
HCT VFR BLD AUTO: 37.6 % (ref 35–47)
HDLC SERPL-MCNC: 122 MG/DL
HGB BLD-MCNC: 12.5 G/DL (ref 11.7–15.7)
LDLC SERPL CALC-MCNC: 97 MG/DL
MCH RBC QN AUTO: 30.9 PG (ref 26.5–33)
MCHC RBC AUTO-ENTMCNC: 33.2 G/DL (ref 31.5–36.5)
MCV RBC AUTO: 93 FL (ref 78–100)
NONHDLC SERPL-MCNC: 116 MG/DL
PLATELET # BLD AUTO: 211 10E3/UL (ref 150–450)
POTASSIUM BLD-SCNC: 3.6 MMOL/L (ref 3.4–5.3)
PROT SERPL-MCNC: 6.6 G/DL (ref 6.8–8.8)
RBC # BLD AUTO: 4.04 10E6/UL (ref 3.8–5.2)
SODIUM SERPL-SCNC: 135 MMOL/L (ref 133–144)
TRIGL SERPL-MCNC: 97 MG/DL
TSH SERPL DL<=0.005 MIU/L-ACNC: 0.74 MU/L (ref 0.4–4)
WBC # BLD AUTO: 3.4 10E3/UL (ref 4–11)

## 2022-07-18 PROCEDURE — 77067 SCR MAMMO BI INCL CAD: CPT | Mod: GC | Performed by: RADIOLOGY

## 2022-07-18 PROCEDURE — 85027 COMPLETE CBC AUTOMATED: CPT | Performed by: PATHOLOGY

## 2022-07-18 PROCEDURE — 84443 ASSAY THYROID STIM HORMONE: CPT | Performed by: PATHOLOGY

## 2022-07-18 PROCEDURE — 36415 COLL VENOUS BLD VENIPUNCTURE: CPT | Performed by: PATHOLOGY

## 2022-07-18 PROCEDURE — 80053 COMPREHEN METABOLIC PANEL: CPT | Performed by: PATHOLOGY

## 2022-07-18 PROCEDURE — 77063 BREAST TOMOSYNTHESIS BI: CPT | Mod: GC | Performed by: RADIOLOGY

## 2022-07-18 PROCEDURE — 80061 LIPID PANEL: CPT | Performed by: PATHOLOGY

## 2022-07-18 PROCEDURE — G0439 PPPS, SUBSEQ VISIT: HCPCS | Performed by: INTERNAL MEDICINE

## 2022-07-18 RX ORDER — LEVOTHYROXINE SODIUM 50 UG/1
50 TABLET ORAL DAILY
Qty: 90 TABLET | Refills: 3 | Status: SHIPPED | OUTPATIENT
Start: 2022-07-18 | End: 2023-07-27

## 2022-07-18 RX ORDER — LISINOPRIL 20 MG/1
20 TABLET ORAL DAILY
Qty: 90 TABLET | Refills: 3 | Status: SHIPPED | OUTPATIENT
Start: 2022-07-18 | End: 2023-07-26

## 2022-07-18 ASSESSMENT — PAIN SCALES - GENERAL: PAINLEVEL: NO PAIN (0)

## 2022-07-18 NOTE — PATIENT INSTRUCTIONS
Patient Education   Personalized Prevention Plan  You are due for the preventive services outlined below.  Your care team is available to assist you in scheduling these services.  If you have already completed any of these items, please share that information with your care team to update in your medical record.  Health Maintenance Due   Topic Date Due     PHQ-2 (once per calendar year)  01/01/2022     FALL RISK ASSESSMENT  07/15/2022     Mammogram  07/15/2022     Preventive Health Recommendations    See your health care provider every year to    Review health changes.     Discuss preventive care.      Review your medicines if your doctor has prescribed any.    You no longer need a yearly Pap test unless you've had an abnormal Pap test in the past 10 years. If you have vaginal symptoms, such as bleeding or discharge, be sure to talk with your provider about a Pap test.    Every 1 to 2 years, have a mammogram.  If you are over 69, talk with your health care provider about whether or not you want to continue having screening mammograms.    Every 10 years, have a colonoscopy. Or, have a yearly FIT test (stool test). These exams will check for colon cancer.     Have a cholesterol test every 5 years, or more often if your doctor advises it.     Have a diabetes test (fasting glucose) every three years. If you are at risk for diabetes, you should have this test more often.     At age 65, have a bone density scan (DEXA) to check for osteoporosis (brittle bone disease).    Shots:    Get a flu shot each year.    Get a tetanus shot every 10 years.    Talk to your doctor about your pneumonia vaccines. There are now two you should receive - Pneumovax (PPSV 23) and Prevnar (PCV 13).    Talk to your pharmacist about the shingles vaccine.    Talk to your doctor about the hepatitis B vaccine.    Nutrition:     Eat at least 5 servings of fruits and vegetables each day.    Eat whole-grain bread, whole-wheat pasta and brown rice  instead of white grains and rice.    Get adequate Calcium and Vitamin D.     Lifestyle    Exercise at least 150 minutes a week (30 minutes a day, 5 days a week). This will help you control your weight and prevent disease.    Limit alcohol to one drink per day.    No smoking.     Wear sunscreen to prevent skin cancer.     See your dentist twice a year for an exam and cleaning.    See your eye doctor every 1 to 2 years to screen for conditions such as glaucoma, macular degeneration and cataracts.    Personalized Prevention Plan  You are due for the preventive services outlined below.  Your care team is available to assist you in scheduling these services.  If you have already completed any of these items, please share that information with your care team to update in your medical record.  Health Maintenance   Topic Date Due     PHQ-2 (once per calendar year)  01/01/2022     FALL RISK ASSESSMENT  07/15/2022     MAMMO SCREENING  07/15/2022     INFLUENZA VACCINE (1) 09/01/2022     ADVANCE CARE PLANNING  09/06/2022     DTAP/TDAP/TD IMMUNIZATION (3 - Td or Tdap) 03/13/2023     MEDICARE ANNUAL WELLNESS VISIT  07/18/2023     DEXA  07/09/2034     Pneumococcal Vaccine: 65+ Years  Completed     ZOSTER IMMUNIZATION  Completed     COVID-19 Vaccine  Completed     IPV IMMUNIZATION  Aged Out     MENINGITIS IMMUNIZATION  Aged Out     HEPATITIS B IMMUNIZATION  Aged Out

## 2022-07-18 NOTE — PROGRESS NOTES
Medicare Annual Wellness Questionnaire:  This 81 year old year old female presents for a Medicare Wellness Exam.    Providers/clinics involved in care:  Patient Care Team       Relationship Specialty Notifications Start End    Ana María Rincon MD PCP - General Internal Medicine  12/23/11     Karma Bustillos MD MD Ophthalmology  3/11/14     Phone: [phone] Fax: [phone]         [address] [facility] [address] [facility] [address]    Karina Barros MD MD Internal Medicine  4/29/15     primary care clinic    Phone: [phone] Pager: [phone] Fax: [phone]        [address] [facility] [address] [facility] [address]    Cami Hearn MD MD OB/Gyn  5/18/15     Phone: [phone] Fax: [phone]         [address]  [facility] [address]    Adelina Prado MD MD Dermatology  6/26/15     Phone: [phone] Fax: [phone]         [address] [facility] [address] [facility] [address]    Negro Williamson MD MD Orthopedics  4/18/16     Phone: [phone] Fax: [phone]         [address] [facility] [address]    Elza Hernandez MD Assigned Neuroscience Provider   11/21/21     Phone: [phone] Fax: [phone]         [address] [facility] [facility] [address]    Noemi Collins MD Assigned PCP   1/9/22     Phone: [phone] Fax: [phone]         [address] [facility] [address] [facility] [address]        Fall Risk Assessment:  Have you fallen 2 or more times in the last year?  N/A    How many times were you injured due to a fall in the last year?  N/A    PHQ-2:  Over the last 2 weeks, how often have you been bothered by feeling down, depressed, or hopeless?  Not at all (0)     Over the last 2 weeks, how often have you had little interest or pleasure in doing things?  Not at all (0)     Social History:  What is your marital status?      Who lives in your household?  N/A    Does your home have loose rugs in the hallway:     No    Does your home  have grab bars in the bathroom:    Yes     Does your home have handrails on the stairs?  Yes     Does your home have poorly lit areas?    No    Do you feel threatened or controlled by a partner, ex-partner or anyone in your life?   No    Has anyone hurt you physically, for example by pushing, hitting, slapping or kicking you or forcing you to have sex?   No    Do you need help with the phone, transportation, shopping, preparing meals, housework, laundry, medications or managing money?   No    Sexual Health:  Are you sexually active?    No    If yes, with men, women, or both?   N/A    If yes, how many partners?  N/A    If yes, are you using condoms?    No    Have you had any sexually transmitted infections in the last year?   No    Do you have any sexual concerns?    No    Women Only:  Women: What year did you stop having periods (approximate age)?  38 years ago    Women: Any vaginal bleeding in the last year?    No    Women: Have you ever had an abnormal Pap smear?    No    General Health Assessment:  Have you noticed any hearing difficulties?   No    Do you wear hearing aids?   No    Have you seen a hearing professional such as an audiologist in the last 1 year?   No    Do you have vision difficulty?    No    Do you wear glasses or contacts?   No    Have you seen an eye doctor in the last 1 year?   No    How many servings of fruits and vegetables do you eat a day?  Fruits: 2 cups  Vegetables: 2 cups    How often do you exercise in a week?  5 days    How long and what kind of exercise do you do?  Dance 1 hour    Tobacco and Alcohol History:  Do you use tobacco/nicotine products?    No    If yes, please list the method of use and average weekly consumption?  N/A    Do you use any other drugs?   No         Do you drink alcohol?   Yes     If you drink alcohol, how many drinks per week?  2 glasses of wine    Advanced Directive:  Have you completed an Advance Directives document?  Yes     If yes, have you given a copy to  the clinic?   No    Do you need information on Advance Directives?   No      Alexus Peng, EMT at 12:52 PM on 7/18/2022

## 2022-07-18 NOTE — NURSING NOTE
Marli Ponce is a 81 year old female patient that presents today in clinic for the following:    Chief Complaint   Patient presents with     Physical     Pt comes into clinic for annual wellness visit     Medication Refill     The patient's allergies and medications were reviewed as noted. A set of vitals were recorded as noted without incident. The patient does not have any other questions for the provider.    KYLE Corbett at 9:53 AM on 7/18/2022

## 2022-07-18 NOTE — PROGRESS NOTES
History of Present Illness:  Ms. Ponce is a 81 year old female who presents for  Chief Complaint   Patient presents with     Physical     Pt comes into clinic for annual wellness visit     Medication Refill     Marli is a pleasant 80 yo female with PMH notable for HTN, HLD, osteopenia and hypothyroidism who is here for an Annual Wellness Visit.    Reports generally good health.  Got COVID vaccine.    Saw NSGY last year for L5 sciatica last year, rec non-operative treatment.  Reports remote accident and R rotator cuff injury. She had an injection remotely, had LOC.    Osteopenia of hips on dexa but elected not for therapy with her past physician.  No fractures.  She takes Ca/D.  Hyperlipidemia, not on medications.    Walking for exercise, morning does aerobics. Mentally good, no depression.  Eats a lot of fruits and vegetables.  Good support system. Drinks very little EtOH, 2-3 glasses per week.    Glaucoma/cataracts, sees eye doctor q 3 months.  No concerns about hearing.  No concerns about memory.  Has adv directive.    The ASCVD Risk score (Nelida DC Jr., et al., 2013) failed to calculate for the following reasons:    The 2013 ASCVD risk score is only valid for ages 40 to 79        Medicare Annual Wellness Questionnaire:  This 81 year old year old female presents for a Medicare Wellness Exam.    Providers/clinics involved in care:  Patient Care Team       Relationship Specialty Notifications Start End    Ana María Rincon MD PCP - General Internal Medicine  12/23/11     Karma Bustillos MD MD Ophthalmology  3/11/14     Phone: [phone] Fax: [phone]         [address] [facility] [address] [facility] [address]    Karina Barros MD MD Internal Medicine  4/29/15     primary care clinic    Phone: [phone] Pager: [phone] Fax: [phone] [address] [facility] [address] [facility] [address]    Cami Hearn MD MD OB/Gyn  5/18/15     Phone: [phone] Fax: [phone] [address]  [facility] [address]    Adelina Prado MD MD Dermatology  6/26/15     Phone: [phone] Fax: [phone]         [address] [facility] [address] [facility] [address]    Negro Williamson MD MD Orthopedics  4/18/16     Phone: [phone] Fax: [phone] [address] [facility] [address]    Elza Hernandez MD Assigned Neuroscience Provider   11/21/21     Phone: [phone] Fax: [phone]         [address] [facility] [id number] [facility] [address]    Noemi Collins MD Assigned PCP   1/9/22     Phone: [phone] Fax: [phone]         [address] [facility] [address] [facility] [address]        Fall Risk Assessment:  Have you fallen 2 or more times in the last year?  N/A    How many times were you injured due to a fall in the last year?  N/A    PHQ-2:  Over the last 2 weeks, how often have you been bothered by feeling down, depressed, or hopeless?  Not at all (0)     Over the last 2 weeks, how often have you had little interest or pleasure in doing things?  Not at all (0)     Social History:  What is your marital status?      Who lives in your household?  N/A    Does your home have loose rugs in the hallway:     No    Does your home have grab bars in the bathroom:    Yes     Does your home have handrails on the stairs?  Yes     Does your home have poorly lit areas?    No    Do you feel threatened or controlled by a partner, ex-partner or anyone in your life?   No    Has anyone hurt you physically, for example by pushing, hitting, slapping or kicking you or forcing you to have sex?   No    Do you need help with the phone, transportation, shopping, preparing meals, housework, laundry, medications or managing money?   No    Sexual Health:  Are you sexually active?    No    If yes, with men, women, or both?   N/A    If yes, how many partners?  N/A    If yes, are you using condoms?    No    Have you had any sexually transmitted infections in the last year?   No    Do  you have any sexual concerns?    No    Women Only:  Women: What year did you stop having periods (approximate age)?  38 years ago    Women: Any vaginal bleeding in the last year?    No    Women: Have you ever had an abnormal Pap smear?    No    General Health Assessment:  Have you noticed any hearing difficulties?   No    Do you wear hearing aids?   No    Have you seen a hearing professional such as an audiologist in the last 1 year?   No    Do you have vision difficulty?    No    Do you wear glasses or contacts?   No    Have you seen an eye doctor in the last 1 year?   No    How many servings of fruits and vegetables do you eat a day?  Fruits: 2 cups  Vegetables: 2 cups    How often do you exercise in a week?  5 days    How long and what kind of exercise do you do?  Dance 1 hour    Tobacco and Alcohol History:  Do you use tobacco/nicotine products?    No    If yes, please list the method of use and average weekly consumption?  N/A    Do you use any other drugs?   No         Do you drink alcohol?   Yes     If you drink alcohol, how many drinks per week?  2 glasses of wine    Advanced Directive:  Have you completed an Advance Directives document?  Yes     If yes, have you given a copy to the clinic?   No    Do you need information on Advance Directives?   No      Alexus Peng, EMT at 12:52 PM on 7/18/2022        Routine Health Maintenence:  Immunizations (zoster, pneumovax, flu, Tdap, Hep A/B):  Advised shingrix vaccination  Most Recent Immunizations   Administered Date(s) Administered     COVID-19,PF,Pfizer 03/02/2021     HEPA 07/26/2005     Influenza (IIV3) PF 09/14/2017     Meningococcal (Menomune ) 07/26/2005     Pneumo Conj 13-V (2010&after) 04/22/2015     Pneumococcal 23 valent 03/17/2014     Poliovirus, inactivated (IPV) 07/26/2005     TD (ADULT, 7+) 06/02/2004     TDAP Vaccine (Boostrix) 03/13/2013     Tdap (Adacel,Boostrix) 03/13/2013     Typhoid IM 07/26/2005     Yellow Fever 07/26/2005     Zoster  vaccine, live 03/13/2013     Lipids:   Recent Labs   Lab Test 07/15/21  1243 06/29/20  1219 05/11/16  1124 04/22/15  1048   CHOL 315* 298*   < > 227*    118   < > 117   * 157*   < > 94   TRIG 76 118   < > 79   CHOLHDLRATIO  --   --   --  1.9    < > = values in this interval not displayed.       Lung Ca Screening (>30 pk age 55-79 or >20 py age 50-79 + RF): n/a  Colonoscopy (50-75 yrs): 10/09 divertic only, no polyp  Dexa (>65W or 70M yrs): 7/19 Conclusions:  The most negative and valid T-score of -1.7 at the level of the left femoral neck and right femoral neck corresponds with low bone density according to WHO criteria for postmenopausal females and men age 50 and over. The risk of osteoporotic fracture increases approximately 2-fold for each 1.0 SD decrease in T-score.  Mammogram (40-75 yrs): ordered  Pap (21-65 yrs):   Lab Results   Component Value Date    PAP NIL 06/29/2012     Safety/Lifestyle: reviewed  Tob/EtOH: reviewed  Depression: screen neg  Advanced Directive: not discussed        Review of external notes as documented above                   A detailed Review of Systems was performed, verified and is negative except as documented in the HPI.  All health questionnaires were reviewed, verified and relevant information documented above.    Past Medical History:  Past Medical History:   Diagnosis Date     Cataract      Hyperlipidemia LDL goal < 130      Hypertension     white coat; home blood pressure monitoring April 2016  average systolic blood pressure approximately 115.     Hypothyroidism      Low bone density 8/16/2017    FRAX tenure probability of fracture, major osteoporotic: 14.6%; hip fracture 4% (increased) Plan:  Alendronate 70 mg weekly; trial basis to assess for reflux esophagitis     Ocular hypertension      Primary open-angle glaucoma     adv       Past Surgical History:  Past Surgical History:   Procedure Laterality Date     CHOLECYSTECTOMY       LAPAROSCOPIC APPENDECTOMY        "LASER IRIDOTOMY OD (RIGHT EYE)      RE/LE  pre 2003     LASER IRIDOTOMY OS (LEFT EYE)  11/20/2010     SELECTIVE LASER TRABECULOPLASTY (SLT) OS (LEFT EYE)  1/22/2010    LE     TRABECULECTOMY, MITOMYCIN FILTER, COMBINED  1/10/2012    LE       Active Meds:  Current Outpatient Medications   Medication     ASPIRIN 81 PO     levothyroxine (SYNTHROID/LEVOTHROID) 50 MCG tablet     lisinopril (ZESTRIL) 20 MG tablet     Multiple Vitamin (MULTIVITAMIN) per tablet     omeprazole (PRILOSEC) 20 MG DR capsule     travoprost CARMELA FREE (TRAVATAN Z) 0.004 % ophthalmic solution     No current facility-administered medications for this visit.        Allergies:  Patient has no known allergies.    Family History:  family history includes Breast Cancer (age of onset: 38) in her sister; Cancer (age of onset: 78) in her mother; Cerebrovascular Disease (age of onset: 52) in her father; Glaucoma in her maternal grandfather and mother; Hypertension in her father and mother.    Social History:  Social History     Tobacco Use     Smoking status: Never Smoker     Smokeless tobacco: Never Used   Substance Use Topics     Alcohol use: Yes     Alcohol/week: 0.0 standard drinks     Comment: rare tequilla     Drug use: No       Physical Exam:  Vitals: BP (!) 148/71 (BP Location: Right arm, Patient Position: Sitting, Cuff Size: Adult Regular)   Pulse 85   Resp 16   Ht 1.549 m (5' 1\")   Wt 56.7 kg (125 lb)   SpO2 98%   BMI 23.62 kg/m    Constitutional: Alert, oriented, pleasant, no acute distress  Head: Normocephalic, atraumatic  Eyes: Extra-ocular movements intact, pupils equally round and reactive bilaterally, no scleral icterus  ENT: Oropharynx clear, moist mucus membranes, good dentition  Neck: Supple, no lymphadenopathy  Cardiovascular: Regular rate and rhythm, no murmurs, rubs or gallops, peripheral pulses full/symmetric  Respiratory: Good air movement bilaterally, lungs clear, no wheezes/rales/rhonchi  GI: Abdomen soft, bowel sounds present, " nondistended, nontender, no organomegaly or masses, no rebound/guarding  Musculoskeletal: No edema, normal muscle tone, normal gait  Neurologic: Alert and oriented, cranial nerves 2-12 intact, grossly non-focal  Skin: No rashes/lesions  Psychiatric: normal mentation, affect and mood      Diagnostics:  Labs reviewed in Epic          Assessment and Plan:  Marli was seen today for physical and medication refill.    Diagnoses and all orders for this visit:    Encounter for Medicare annual wellness exam    Laboratory examination ordered as part of a routine general medical examination  -     CBC with platelets; Future    Other hyperlipidemia  She would be open to statin pending results  -     Lipid panel reflex to direct LDL Fasting; Future    Essential hypertension, benign  -     Comprehensive metabolic panel; Future    Hypothyroidism, unspecified type  -     TSH with free T4 reflex; Future      Essential hypertension  Well controlled at home.    Gastroesophageal reflux disease without esophagitis  Cont PPI          Noemi Collins MD  Internal Medicine

## 2022-11-19 ENCOUNTER — HEALTH MAINTENANCE LETTER (OUTPATIENT)
Age: 82
End: 2022-11-19

## 2023-01-28 DIAGNOSIS — K21.9 GASTROESOPHAGEAL REFLUX DISEASE WITHOUT ESOPHAGITIS: ICD-10-CM

## 2023-03-03 ENCOUNTER — TELEPHONE (OUTPATIENT)
Dept: NEUROSURGERY | Facility: CLINIC | Age: 83
End: 2023-03-03
Payer: MEDICARE

## 2023-03-03 DIAGNOSIS — M54.16 LUMBAR RADICULOPATHY: Primary | ICD-10-CM

## 2023-03-03 DIAGNOSIS — M41.9 SCOLIOSIS: ICD-10-CM

## 2023-03-03 NOTE — TELEPHONE ENCOUNTER
[facility] Call Center    Phone Message    May a detailed message be left on voicemail: yes     Reason for Call: Other: Pt spouse Jr is calling about making appt for her spine. Please call Pt back to make appt.      Action Taken: Message routed to:  [facility] Neurosurgery    Travel Screening: Not Applicable

## 2023-03-06 NOTE — TELEPHONE ENCOUNTER
FUTURE VISIT INFORMATION      FUTURE VISIT INFORMATION:    Date: 3/8/2023    Time: 1230pm    Location: [facility]  REFERRAL INFORMATION:    Referring provider:  Self     Referring providers clinic:      Reason for visit/diagnosis  Follow-up     RECORDS REQUESTED FROM:       Clinic name Comments Records Status Imaging Status   Internal Dr. Hogue-11/17/2021    Dr. Hernandez-11/9/2021    MR Lumbar Spine-11/7/2021 Epic PACS

## 2023-03-08 ENCOUNTER — OFFICE VISIT (OUTPATIENT)
Dept: NEUROSURGERY | Facility: CLINIC | Age: 83
End: 2023-03-08
Payer: MEDICARE

## 2023-03-08 ENCOUNTER — ANCILLARY PROCEDURE (OUTPATIENT)
Dept: GENERAL RADIOLOGY | Facility: CLINIC | Age: 83
End: 2023-03-08
Attending: PHYSICIAN ASSISTANT
Payer: MEDICARE

## 2023-03-08 ENCOUNTER — PRE VISIT (OUTPATIENT)
Dept: NEUROSURGERY | Facility: CLINIC | Age: 83
End: 2023-03-08

## 2023-03-08 VITALS
DIASTOLIC BLOOD PRESSURE: 90 MMHG | BODY MASS INDEX: 23.62 KG/M2 | WEIGHT: 125 LBS | SYSTOLIC BLOOD PRESSURE: 162 MMHG | OXYGEN SATURATION: 97 % | RESPIRATION RATE: 16 BRPM | HEART RATE: 73 BPM

## 2023-03-08 DIAGNOSIS — M54.42 MIDLINE LOW BACK PAIN WITH LEFT-SIDED SCIATICA, UNSPECIFIED CHRONICITY: ICD-10-CM

## 2023-03-08 DIAGNOSIS — M76.32 IT BAND SYNDROME, LEFT: ICD-10-CM

## 2023-03-08 DIAGNOSIS — M54.16 LUMBAR RADICULOPATHY: ICD-10-CM

## 2023-03-08 DIAGNOSIS — M46.1 BILATERAL SACROILIITIS (H): ICD-10-CM

## 2023-03-08 DIAGNOSIS — M70.61 GREATER TROCHANTERIC BURSITIS OF BOTH HIPS: ICD-10-CM

## 2023-03-08 DIAGNOSIS — M41.9 SCOLIOSIS: ICD-10-CM

## 2023-03-08 DIAGNOSIS — M41.25 OTHER IDIOPATHIC SCOLIOSIS, THORACOLUMBAR REGION: ICD-10-CM

## 2023-03-08 DIAGNOSIS — M54.16 LUMBAR RADICULOPATHY: Primary | ICD-10-CM

## 2023-03-08 DIAGNOSIS — M70.62 GREATER TROCHANTERIC BURSITIS OF BOTH HIPS: ICD-10-CM

## 2023-03-08 PROCEDURE — 99215 OFFICE O/P EST HI 40 MIN: CPT | Performed by: PHYSICIAN ASSISTANT

## 2023-03-08 PROCEDURE — 77073 BONE LENGTH STUDIES: CPT | Performed by: SURGERY

## 2023-03-08 PROCEDURE — 72082 X-RAY EXAM ENTIRE SPI 2/3 VW: CPT | Performed by: SURGERY

## 2023-03-08 ASSESSMENT — PAIN SCALES - GENERAL: PAINLEVEL: MODERATE PAIN (5)

## 2023-03-08 NOTE — PATIENT INSTRUCTIONS
You saw Antionette Casey PA-C in clinic today.    Antionette ordered a lumbar MRI.  Please follow up in 2- 3 days after the MRI is completed to discuss.  Okay to do this virtually.  You may call [phone] to schedule your appointment if you are unable to do so today.     A referral to PT has been sent.  They will call you to schedule within a couple of days.  If you do not get a call, please reach out to them to schedule.

## 2023-03-08 NOTE — PROGRESS NOTES
Neurosurgery Clinic Note    Chief Complaint: low back pain with left leg pain    History of Present Illness:  It was a pleasure to evaluate Marli Ponce in clinic today at the kind referral of Referred Self, MD  No address on file.    Marli Ponce is a 82 year old female presenting with low back pain with LLE radiculopathy.  The patient has seen Dr. Hogue and Dr. Hernandez in the past for similar pain on 2021.  She notes in the last couple of months, her pain has become quite severe.  She denies any falls or trauma.  The pain is dull at rest, but become sharp if she makes certain movements.  The radicular pain extends from her buttock and travels down her posterolateral thigh, calf and extends into the top of her left foot.  Activities such as mopping, sitting, sleeping on her left side and bending cause more pain.  She has no issues with standing or walking up/down steps.  She endorses weakness in her leg when the pain is severe.  She denies bowel/bladder issues.  She does have some pain in her right hip, but this is not bothering her as much as her left leg right now.     Conservative Management has consistent of OTC medication and topicals.  She has not had recent PT for this bout of pain, but did do that in 2021.  She does do dancing and stretching at home.     She does not want to do anything with needles, and therefore has not had any steroid injections.     Note she does have osteopenia and is taking vitamins C and D, but has declined to take anabolic medication that was offered to her because of it being in an  Injectable form.     She and her  are from [place].  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  She is a mother of 5 children.  Her  with her today is a retired cardiothoracic surgeon.     Past Medical History:   Diagnosis Date     Cataract      Hyperlipidemia LDL goal < 130      Hypertension     white coat; home blood pressure monitoring April 2016   "average systolic blood pressure approximately 115.     Hypothyroidism      Low bone density 2017    FRAX tenure probability of fracture, major osteoporotic: 14.6%; hip fracture 4% (increased) Plan:  Alendronate 70 mg weekly; trial basis to assess for reflux esophagitis     Ocular hypertension      Primary open-angle glaucoma     adv       Past Surgical History:   Procedure Laterality Date     CHOLECYSTECTOMY       LAPAROSCOPIC APPENDECTOMY       LASER IRIDOTOMY OD (RIGHT EYE)      RE/LE  pre 2003     LASER IRIDOTOMY OS (LEFT EYE)  2010     SELECTIVE LASER TRABECULOPLASTY (SLT) OS (LEFT EYE)  2010    LE     TRABECULECTOMY, MITOMYCIN FILTER, COMBINED  1/10/2012    LE       Social History     Socioeconomic History     Marital status:    Tobacco Use     Smoking status: Never     Smokeless tobacco: Never   Substance and Sexual Activity     Alcohol use: Yes     Alcohol/week: 0.0 standard drinks     Comment: rare tequilla     Drug use: No     Sexual activity: Yes     Partners: Male     Comment: 1962   Social History Narrative     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       IMAGING   Imaging independently reviewed.    EOS 3/8/23 - mild thoracolumbar scoliosis with minimal progression.  No significant sagittal imbalance.      \"Impression:  1. Mild levoscoliosis of the lumbar spine.  2. No  global sagittal imbalance.  3. Weight bearing axis as detailed above.\"          Resulted Imaging/Labs:    Bone Density:  The most negative and valid T-score of -1.7 at the level of the left femoral neck and right femoral neck corresponds with low bone density according to WHO " "criteria for postmenopausal females and men age 50 and over. The risk of osteoporotic fracture increases approximately 2-fold for each 1.0 SD decrease in T-score.    Vitamin D:  Vitamin D Deficiency Screening Results:  Lab Results   Component Value Date    VITDT 43 06/29/2020       Nutritional Status:  Estimated body mass index is 23.62 kg/m  as calculated from the following:    Height as of 7/18/22: 1.549 m (5' 1\").    Weight as of this encounter: 56.7 kg (125 lb).    Lab Results   Component Value Date    ALBUMIN 3.4 07/18/2022    ALBUMIN 3.7 06/29/2020       Diabetes Screening:  Lab Results   Component Value Date    A1C 5.5 07/15/2021       Nicotine Usage:  No     Physical Exam   BP (!) 162/90   Pulse 73   Resp 16   Wt 56.7 kg (125 lb)   SpO2 97%   BMI 23.62 kg/m      Constitutional: Appears well-developed and well-nourished. Cooperative. No acute distress.   HENT:   Head: Normocephalic and atraumatic.   Eyes: Conjunctivae are normal.  Neck: Neck supple. No tracheal deviation present.  Cardiovascular: Normal rate and regular rhythm.    Pulmonary/Chest: Effort normal and breath sounds normal.  Abdominal: Exhibits no obvious distension.   Musculoskeletal: Able to move all extremities.  No involuntary motor movements. No C/T/L spine tenderness to palpation.  +left gluteal TTP, +bilateral L>R SI joint TTP, +left IT band TTP from greater trochanter to knee, +left GT bursal TTP  Lumbar flexion/extension range of motion: good ROM, notes pain in low back  Skin: Skin is warm, dry and intact.   Psychiatric: Normal mood and affect. Speech is normal and behavior is normal.    Neurological:  Alert, NAD, and oriented to person, place, and time.   No cranial nerve deficit   Gait: steady gait, without assistive devices  Good posture    Strength (L/R)  5/5 Hip Flexion  5/5 Knee Extension  5/5 Ankle Dorsiflexion  5/5 Extensor Hallucis Longus  5/5 Plantar Flexion    Reflexes (L/R)  1+/1+ Patellar  1+/1+ Ankle    No Babinski  No " ankle clonus    Sensation: SILT - notes sensory change in the lateral hip area compared to rest of leg.       Sacroiliac Joint Exam LEFT RIGHT   Jorje Finger Test ++ +   PSIS tenderness ++ +   LARRY ++ +   Pelvic Compression - -   Gaenslen s + -   Sacral Thrust - -   Negative SCOUR  +hip external rotation bilateral w/ buttock/hip px  +left IT band TTP  +bilateral GT bursal TTP    ASSESSMENT:  Marli Ponce is a 82 year old female with recurrent low back and left L5 type radiculopathy.  She also has fairly significant pain in her left glut, SI joint, and IT band with provocative testing and palpation.  Patient does not wish to consider injections at this time as she is not a fan of needles.  She would prefer to do what she can with PT and medical management.      Her EOS today does show some mild progression of the thoracolumbar scoliosis.  We discussed that it is important that she have good bone health and keep her core muscle strong because of her scoliosis.  She already has osteopenia and would benefit from anabolic bone therapy.  Unfortunately this therapy involves the use of a needle and thus far she has declined.      Her last lumbar MRI was in 2021 which revealed L4/5 mod CCS and left lateral FS as well as advanced facet hypertrophy from L4-S1.  Her MRI needs to be updated.     PLAN:    Ordered lumbar MRI w/o. Follow up once that has been completed to discuss.  Okay to do virtually.    Referral to PT placed.  They live in [place] and would like to do this closer to her home.      I spent 45 minutes in patient care with greater than 50% spent in counseling and/or coordination of care.    I performed independent visualization of radiographic imaging and entered my own interpretation, reviewed and summarized old records.       Antionette Casey PA-C  Department of Neurosurgery  Office: [phone]

## 2023-03-08 NOTE — LETTER
3/8/2023       RE: Marli Ponce  [address] [address]     Dear Colleague,    Thank you for referring your patient, Marli Ponce, to the [facility] NEUROSURGERY CLINIC [place] [facility]. Please see a copy of my visit note below.        Neurosurgery Clinic Note    Chief Complaint: low back pain with left leg pain    History of Present Illness:  It was a pleasure to evaluate Marli Ponce in clinic today at the kind referral of Referred Self, MD  No address on file.    Marli Ponce is a 82 year old female presenting with low back pain with LLE radiculopathy.  The patient has seen Dr. Hogue and Dr. Hernandez in the past for similar pain on 2021.  She notes in the last couple of months, her pain has become quite severe.  She denies any falls or trauma.  The pain is dull at rest, but become sharp if she makes certain movements.  The radicular pain extends from her buttock and travels down her posterolateral thigh, calf and extends into the top of her left foot.  Activities such as mopping, sitting, sleeping on her left side and bending cause more pain.  She has no issues with standing or walking up/down steps.  She endorses weakness in her leg when the pain is severe.  She denies bowel/bladder issues.  She does have some pain in her right hip, but this is not bothering her as much as her left leg right now.     Conservative Management has consistent of OTC medication and topicals.  She has not had recent PT for this bout of pain, but did do that in 2021.  She does do dancing and stretching at home.     She does not want to do anything with needles, and therefore has not had any steroid injections.     Note she does have osteopenia and is taking vitamins C and D, but has declined to take anabolic medication that was offered to her because of it being in an  Injectable form.     She and her  are from [place].  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  She is a mother of 5 children.  Her  with her today is a retired cardiothoracic surgeon.     Past Medical History:   Diagnosis Date     Cataract      Hyperlipidemia LDL goal < 130      Hypertension     white coat; home blood pressure monitoring 2016  average systolic blood pressure approximately 115.     Hypothyroidism      Low bone density 2017    FRAX tenure probability of fracture, major osteoporotic: 14.6%; hip fracture 4% (increased) Plan:  Alendronate 70 mg weekly; trial basis to assess for reflux esophagitis     Ocular hypertension      Primary open-angle glaucoma     adv       Past Surgical History:   Procedure Laterality Date     CHOLECYSTECTOMY       LAPAROSCOPIC APPENDECTOMY       LASER IRIDOTOMY OD (RIGHT EYE)      RE/LE  pre      LASER IRIDOTOMY OS (LEFT EYE)  2010     SELECTIVE LASER TRABECULOPLASTY (SLT) OS (LEFT EYE)  2010    LE     TRABECULECTOMY, MITOMYCIN FILTER, COMBINED  1/10/2012    LE       Social History     Socioeconomic History     Marital status:    Tobacco Use     Smoking status: Never     Smokeless tobacco: Never   Substance and Sexual Activity     Alcohol use: Yes     Alcohol/week: 0.0 standard drinks     Comment: rare tequilla     Drug use: No     Sexual activity: Yes     Partners: Male     Comment: 1962   Social History Narrative     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       IMAGING   Imaging independently reviewed.    EOS 3/8/23 - mild thoracolumbar scoliosis with minimal progression.  No significant sagittal imbalance.      \"Impression:  1. Mild " "levoscoliosis of the lumbar spine.  2. No  global sagittal imbalance.  3. Weight bearing axis as detailed above.\"          Resulted Imaging/Labs:    Bone Density:  The most negative and valid T-score of -1.7 at the level of the left femoral neck and right femoral neck corresponds with low bone density according to WHO criteria for postmenopausal females and men age 50 and over. The risk of osteoporotic fracture increases approximately 2-fold for each 1.0 SD decrease in T-score.    Vitamin D:  Vitamin D Deficiency Screening Results:  Lab Results   Component Value Date    VITDT 43 06/29/2020       Nutritional Status:  Estimated body mass index is 23.62 kg/m  as calculated from the following:    Height as of 7/18/22: 1.549 m (5' 1\").    Weight as of this encounter: 56.7 kg (125 lb).    Lab Results   Component Value Date    ALBUMIN 3.4 07/18/2022    ALBUMIN 3.7 06/29/2020       Diabetes Screening:  Lab Results   Component Value Date    A1C 5.5 07/15/2021       Nicotine Usage:  No     Physical Exam   BP (!) 162/90   Pulse 73   Resp 16   Wt 56.7 kg (125 lb)   SpO2 97%   BMI 23.62 kg/m      Constitutional: Appears well-developed and well-nourished. Cooperative. No acute distress.   HENT:   Head: Normocephalic and atraumatic.   Eyes: Conjunctivae are normal.  Neck: Neck supple. No tracheal deviation present.  Cardiovascular: Normal rate and regular rhythm.    Pulmonary/Chest: Effort normal and breath sounds normal.  Abdominal: Exhibits no obvious distension.   Musculoskeletal: Able to move all extremities.  No involuntary motor movements. No C/T/L spine tenderness to palpation.  +left gluteal TTP, +bilateral L>R SI joint TTP, +left IT band TTP from greater trochanter to knee, +left GT bursal TTP  Lumbar flexion/extension range of motion: good ROM, notes pain in low back  Skin: Skin is warm, dry and intact.   Psychiatric: Normal mood and affect. Speech is normal and behavior is normal.    Neurological:  Alert, NAD, and " oriented to person, place, and time.   No cranial nerve deficit   Gait: steady gait, without assistive devices  Good posture    Strength (L/R)  5/5 Hip Flexion  5/5 Knee Extension  5/5 Ankle Dorsiflexion  5/5 Extensor Hallucis Longus  5/5 Plantar Flexion    Reflexes (L/R)  1+/1+ Patellar  1+/1+ Ankle    No Babinski  No ankle clonus    Sensation: SILT - notes sensory change in the lateral hip area compared to rest of leg.       Sacroiliac Joint Exam LEFT RIGHT   Jorje Finger Test ++ +   PSIS tenderness ++ +   LARRY ++ +   Pelvic Compression - -   Gaenslen s + -   Sacral Thrust - -   Negative SCOUR  +hip external rotation bilateral w/ buttock/hip px  +left IT band TTP  +bilateral GT bursal TTP    ASSESSMENT:  Marli Ponce is a 82 year old female with recurrent low back and left L5 type radiculopathy.  She also has fairly significant pain in her left glut, SI joint, and IT band with provocative testing and palpation.  Patient does not wish to consider injections at this time as she is not a fan of needles.  She would prefer to do what she can with PT and medical management.      Her EOS today does show some mild progression of the thoracolumbar scoliosis.  We discussed that it is important that she have good bone health and keep her core muscle strong because of her scoliosis.  She already has osteopenia and would benefit from anabolic bone therapy.  Unfortunately this therapy involves the use of a needle and thus far she has declined.      Her last lumbar MRI was in 2021 which revealed L4/5 mod CCS and left lateral FS as well as advanced facet hypertrophy from L4-S1.  Her MRI needs to be updated.     PLAN:    Ordered lumbar MRI w/o. Follow up once that has been completed to discuss.  Okay to do virtually.    Referral to PT placed.  They live in [place] and would like to do this closer to her home.      I spent 45 minutes in patient care with greater than 50% spent in counseling and/or coordination of care.    I  performed independent visualization of radiographic imaging and entered my own interpretation, reviewed and summarized old records.       Antionette Casey PA-C  Department of Neurosurgery  Office: [phone]

## 2023-03-17 ENCOUNTER — TELEPHONE (OUTPATIENT)
Dept: NEUROSURGERY | Facility: CLINIC | Age: 83
End: 2023-03-17
Payer: MEDICARE

## 2023-03-20 ENCOUNTER — ANCILLARY PROCEDURE (OUTPATIENT)
Dept: MRI IMAGING | Facility: CLINIC | Age: 83
End: 2023-03-20
Attending: PHYSICIAN ASSISTANT
Payer: MEDICARE

## 2023-03-20 DIAGNOSIS — M54.16 LUMBAR RADICULOPATHY: ICD-10-CM

## 2023-03-20 DIAGNOSIS — M54.42 MIDLINE LOW BACK PAIN WITH LEFT-SIDED SCIATICA, UNSPECIFIED CHRONICITY: ICD-10-CM

## 2023-03-20 PROCEDURE — 72148 MRI LUMBAR SPINE W/O DYE: CPT | Mod: MG | Performed by: RADIOLOGY

## 2023-03-20 PROCEDURE — G1010 CDSM STANSON: HCPCS | Performed by: RADIOLOGY

## 2023-03-23 ENCOUNTER — OFFICE VISIT (OUTPATIENT)
Dept: NEUROSURGERY | Facility: CLINIC | Age: 83
End: 2023-03-23
Payer: MEDICARE

## 2023-03-23 VITALS
HEIGHT: 60 IN | HEART RATE: 51 BPM | BODY MASS INDEX: 25.15 KG/M2 | OXYGEN SATURATION: 100 % | WEIGHT: 128.1 LBS | SYSTOLIC BLOOD PRESSURE: 183 MMHG | DIASTOLIC BLOOD PRESSURE: 74 MMHG

## 2023-03-23 DIAGNOSIS — M54.16 LUMBAR RADICULOPATHY: Primary | ICD-10-CM

## 2023-03-23 DIAGNOSIS — M41.20 OTHER IDIOPATHIC SCOLIOSIS, UNSPECIFIED SPINAL REGION: ICD-10-CM

## 2023-03-23 PROCEDURE — 99214 OFFICE O/P EST MOD 30 MIN: CPT | Performed by: NEUROLOGICAL SURGERY

## 2023-03-23 ASSESSMENT — PAIN SCALES - GENERAL: PAINLEVEL: MODERATE PAIN (5)

## 2023-03-23 NOTE — LETTER
"3/23/2023       RE: Marli Ponce  [address] [address]       Dear Colleague,    Thank you for referring your patient, Marli Ponce, to the [facility] NEUROSURGERY CLINIC [place] at [facility]. Please see a copy of my visit note below.        Neurosurgery Clinic Note    Chief Complaint: low back pain with left leg pain    History of Present Illness:  It was a pleasure to evaluate Marli Ponce in clinic today at the kind referral of Referred Self, MD  No address on file.    Marli Ponce is a 82 year old female presenting with low back pain with LLE radiculopathy.  She was last seen by me on 3/8/23 and referred out for MRI and PT.  She returns today for review of her MRI and discuss further recommendations.    In the interim, she notes her aching back pain has increased.  She is most concerned about the numbness in her left leg that goes from her buttock into her ankle area which is located posterolaterally.  She does have increased pain with activity. Patient does not like needles.       IMAGING   Imaging independently reviewed.    Lumbar MRI 3/20/23 -   \"Impression: Multilevel lumbar spondylosis without high-grade spinal  canal stenosis or high-grade neural foraminal narrowing. Findings are  relatively unchanged from prior study.\"      ASSESSMENT & PLAN:  Marli Ponce is a 82 year old female with recurrent low back and left L5 type radiculopathy.  While her symptoms are persistent and progressing, her MRI has not changed much from her prior study in 2021 and does not reveal any significant central or foraminal stenosis.      It is possible the MRI is not picking up a nerve impingement or that she has an issue more peripherally (like piriformis syndrome).  The radicular pain and numbness in her left leg, however, do seem to mimic an L5 dermatomal pattern.      Patient is willing to consider a diagnostic and therapeutic L5 nerve " block.  If this resolves her symptoms and her pain returns, Dr. Hernandez would consider doing a foraminotomy.  Follow up dependent on results of injection.      Continue with PT as previously planned.      I spent 30 minutes in patient care with greater than 50% spent in counseling and/or coordination of care.    I performed independent visualization of radiographic imaging and entered my own interpretation, reviewed and summarized old records and discussed with another provider.      Patient discussed and seen with Dr. Hernandez.      Patient and her  are in agreement with the plan and had no further questions at this time.               Again, thank you for allowing me to participate in the care of your patient.      Sincerely,    Elza Hernandez MD

## 2023-03-23 NOTE — PATIENT INSTRUCTIONS
ROBERTO Hernandez has ordered a Left L5 Selective Nerve Root Block.  Pain Clinic staff will help you coordinate an appointment.  Follow-up plan will depend on injection effect.

## 2023-03-24 ENCOUNTER — TELEPHONE (OUTPATIENT)
Dept: ANESTHESIOLOGY | Facility: CLINIC | Age: 83
End: 2023-03-24
Payer: MEDICARE

## 2023-03-24 NOTE — TELEPHONE ENCOUNTER
[facility] Call Center    Phone Message    May a detailed message be left on voicemail: yes     Reason for Call: Other: An urgent procedure referral has been placed for this patient to schedule Epidural (Advanced imaging required in the last 3 years) with Dr. Skelton. Please contact patient for scheduling     Action Taken: Other: Routed to [facility] and Clinic Coordinators    Travel Screening: Not Applicable

## 2023-03-24 NOTE — PROGRESS NOTES
"    Neurosurgery Clinic Note    Chief Complaint: low back pain with left leg pain    History of Present Illness:  It was a pleasure to evaluate Marli Ponce in clinic today at the kind referral of Referred Self, MD  No address on file.    Marli Ponce is a 82 year old female presenting with low back pain with LLE radiculopathy.  She was last seen by me on 3/8/23 and referred out for MRI and PT.  She returns today for review of her MRI and discuss further recommendations.    In the interim, she notes her aching back pain has increased.  She is most concerned about the numbness in her left leg that goes from her buttock into her ankle area which is located posterolaterally.  She does have increased pain with activity. Patient does not like needles.       IMAGING   Imaging independently reviewed.    Lumbar MRI 3/20/23 -   \"Impression: Multilevel lumbar spondylosis without high-grade spinal  canal stenosis or high-grade neural foraminal narrowing. Findings are  relatively unchanged from prior study.\"      ASSESSMENT & PLAN:  Marli Ponce is a 82 year old female with recurrent low back and left L5 type radiculopathy.  While her symptoms are persistent and progressing, her MRI has not changed much from her prior study in 2021 and does not reveal any significant central or foraminal stenosis.      It is possible the MRI is not picking up a nerve impingement or that she has an issue more peripherally (like piriformis syndrome).  The radicular pain and numbness in her left leg, however, do seem to mimic an L5 dermatomal pattern.      Patient is willing to consider a diagnostic and therapeutic L5 nerve block.  If this resolves her symptoms and her pain returns, Dr. Hernandez would consider doing a foraminotomy.  Follow up dependent on results of injection.      Continue with PT as previously planned.      I spent 30 minutes in patient care with greater than 50% spent in counseling and/or coordination of care.    I " performed independent visualization of radiographic imaging and entered my own interpretation, reviewed and summarized old records and discussed with another provider.      Patient discussed and seen with Dr. Hernandez.      Patient and her  are in agreement with the plan and had no further questions at this time.       NIKI PerezC  Department of Neurosurgery  Office: [phone]    I have seen this patient with the PA and formulated a plan and agree with this note.  AMP

## 2023-03-28 ENCOUNTER — TELEPHONE (OUTPATIENT)
Dept: ANESTHESIOLOGY | Facility: CLINIC | Age: 83
End: 2023-03-28
Payer: MEDICARE

## 2023-03-28 DIAGNOSIS — M54.16 LUMBAR RADICULITIS: Primary | ICD-10-CM

## 2023-03-28 NOTE — TELEPHONE ENCOUNTER
Writer attempted to contact the patient to schedule procedure with Dr. Skelton. Male answered and disconnected when writer asked if the patient was available.    Jin Lindquist on 3/28/2023 at 4:18 PM

## 2023-04-03 ENCOUNTER — TELEPHONE (OUTPATIENT)
Dept: ANESTHESIOLOGY | Facility: CLINIC | Age: 83
End: 2023-04-03
Payer: MEDICARE

## 2023-04-03 NOTE — TELEPHONE ENCOUNTER
Spoke with patient and , they no longer want to move forward with injection and are seeking different treatments    Anna C. Schoenecker on 4/3/2023 at 10:42 AM

## 2023-04-13 ENCOUNTER — MYC MEDICAL ADVICE (OUTPATIENT)
Dept: INTERNAL MEDICINE | Facility: CLINIC | Age: 83
End: 2023-04-13
Payer: MEDICARE

## 2023-04-14 ENCOUNTER — VIRTUAL VISIT (OUTPATIENT)
Dept: INTERNAL MEDICINE | Facility: CLINIC | Age: 83
End: 2023-04-14
Payer: MEDICARE

## 2023-04-14 DIAGNOSIS — M85.851 OSTEOPENIA OF BOTH HIPS: ICD-10-CM

## 2023-04-14 DIAGNOSIS — Z78.0 ASYMPTOMATIC POSTMENOPAUSAL STATE: Primary | ICD-10-CM

## 2023-04-14 DIAGNOSIS — M85.852 OSTEOPENIA OF BOTH HIPS: ICD-10-CM

## 2023-04-14 PROCEDURE — 99213 OFFICE O/P EST LOW 20 MIN: CPT | Mod: VID | Performed by: INTERNAL MEDICINE

## 2023-04-14 NOTE — NURSING NOTE
Is the patient currently in the state of [place]? YES    Visit mode:VIDEO    If the visit is dropped, the patient can be reconnected by: VIDEO VISIT: Send to e-mail at: [email or web address]    Will anyone else be joining the visit? Yes,  Jr.      How would you like to obtain your AVS? MyChart    Are changes needed to the allergy or medication list? YES: Pt taking Vitamin D3 and Calcium, both once daily    Reason for visit: Video Visit    DRAKE WREN

## 2023-04-14 NOTE — PROGRESS NOTES
Marli Ponce is a 82 year old female who is being evaluated via a billable video visit.      The patient has consented to a video visit and informed that video and telephone visits are being performed during the COVID-19 pandemic in order to mitigate the risk of an in office visit for appropriate candidates/issues. If during the course of the call the physician/provider feels a video visit is not appropriate, you will not be charged for this service. If the provider feels that they are unable to assess your concerns without an in person visit, you will be advised of this limitation and depending on the nature of the concern, advised to seek in person care if your provider feels you need urgent evaluation.      Subjective     Marli Ponce is a 82 year old female who presents to clinic today for the following health issues:    Chief Complaint   Patient presents with     Video Visit       HPI    Marli is a pleasant 81 yo female with PMH notable for HTN, HLD, osteopenia and hypothyroidism.    She has a history of lumbar DJD, scoliosis, apparently worsening. No fractures. She is following with neurosurgery and states they may consider surgery.  She received dexamethasone, no injections, for her radicular symptoms.    She is currently taking caltrate/D and D3. She has taken HRT but no bone specific medications in the past as she elected not to.    Dexa 7/19:  Conclusions:  The most negative and valid T-score of -1.7 at the level of the left femoral neck and right femoral neck corresponds with low bone density according to WHO criteria for postmenopausal females and men age 50 and over. The risk of osteoporotic fracture increases approximately 2-fold for each 1.0 SD decrease in T-score.     FRAX--> 14/4.2%      Review of external notes as documented above                         Review of Systems   A detailed ROS was performed and was negative unless indicated in the HPI above.        Physical Exam   There were no  "vitals taken for this visit.  Wt Readings from Last 2 Encounters:   03/23/23 58.1 kg (128 lb 1.6 oz)   03/08/23 56.7 kg (125 lb)      Ht Readings from Last 2 Encounters:   03/23/23 1.524 m (5')   07/18/22 1.549 m (5' 1\")     GENERAL: healthy, alert and no distress  HEAD: Normocephalic, atraumatic  EYES: Eyes grossly normal to inspection, EOMI and conjunctivae and sclerae normal  RESP: Speaking in full sentences, unlabored, no audible wheezes or cough  SKIN: no suspicious lesions or rashes, no jaundice  NEURO: oriented, and speech normal  PSYCH: mentation appears normal, affect normal/bright          Diagnostic Test Results:  Labs reviewed in Epic            Assessment and Plan:  Marli was seen today for video visit.    Diagnoses and all orders for this visit:    Asymptomatic postmenopausal state  Osteopenia of both hips  Taking Ca/D.  Replete on last labs. She has no fractures but scores qualify for treatment. Prior to treatment, recommend repeating dexa for new baseline. Additionally, would need to touch base with neurosurgery as they may not want her on bisphosphonates if anticipating spinal surgery.  -     DX Hip/Pelvis/Spine; Future        Video-Visit Details    Type of service:  Video Visit    Video Start/End Time: 12:04 PM 12:13 PM    Originating Location (pt. Location): Home    Distant Location (provider location):  [facility] PRIMARY [facility] CLINIC     Mode of Communication:  Video Conference via  [employer] or  [employer]        Noemi Collins MD  Internal Medicine        "

## 2023-04-14 NOTE — PATIENT INSTRUCTIONS
Clinics and [facility] scheduling information: [phone] (Imaging) to schedule DEXA scan/bone density test.

## 2023-06-06 ENCOUNTER — TELEPHONE (OUTPATIENT)
Dept: NEUROSURGERY | Facility: CLINIC | Age: 83
End: 2023-06-06

## 2023-06-06 ENCOUNTER — MYC MEDICAL ADVICE (OUTPATIENT)
Dept: NEUROSURGERY | Facility: CLINIC | Age: 83
End: 2023-06-06

## 2023-06-06 ENCOUNTER — ANCILLARY PROCEDURE (OUTPATIENT)
Dept: BONE DENSITY | Facility: CLINIC | Age: 83
End: 2023-06-06
Attending: INTERNAL MEDICINE
Payer: MEDICARE

## 2023-06-06 DIAGNOSIS — Z78.0 ASYMPTOMATIC POSTMENOPAUSAL STATE: ICD-10-CM

## 2023-06-06 PROCEDURE — 77080 DXA BONE DENSITY AXIAL: CPT | Performed by: INTERNAL MEDICINE

## 2023-06-06 NOTE — TELEPHONE ENCOUNTER
Patients  called asking to get patient scheduled for surgery this week, but did not state which provider. I spoke with Anders Infante RN CC and he will contact patient with updated information.      Zach Chase on 6/6/2023 at 3:50 PM

## 2023-06-08 NOTE — TELEPHONE ENCOUNTER
Pt's  said that they need an appointment with Dr. Hernandez.  He is requesting a call back to discuss.  Thanks.

## 2023-06-13 ENCOUNTER — TELEPHONE (OUTPATIENT)
Dept: NEUROSURGERY | Facility: CLINIC | Age: 83
End: 2023-06-13
Payer: MEDICARE

## 2023-06-20 ENCOUNTER — OFFICE VISIT (OUTPATIENT)
Dept: NEUROSURGERY | Facility: CLINIC | Age: 83
End: 2023-06-20
Payer: MEDICARE

## 2023-06-20 VITALS
OXYGEN SATURATION: 98 % | WEIGHT: 122 LBS | HEIGHT: 60 IN | HEART RATE: 50 BPM | SYSTOLIC BLOOD PRESSURE: 191 MMHG | BODY MASS INDEX: 23.95 KG/M2 | DIASTOLIC BLOOD PRESSURE: 91 MMHG

## 2023-06-20 DIAGNOSIS — Z98.890 S/P LAMINECTOMY: ICD-10-CM

## 2023-06-20 DIAGNOSIS — M54.16 LUMBAR RADICULOPATHY: Primary | ICD-10-CM

## 2023-06-20 PROCEDURE — 99214 OFFICE O/P EST MOD 30 MIN: CPT | Mod: GC | Performed by: NEUROLOGICAL SURGERY

## 2023-06-20 RX ORDER — CYCLOBENZAPRINE HCL 5 MG
5 TABLET ORAL 3 TIMES DAILY PRN
Qty: 42 TABLET | Refills: 1 | Status: SHIPPED | OUTPATIENT
Start: 2023-06-20 | End: 2024-04-10

## 2023-06-20 ASSESSMENT — PAIN SCALES - GENERAL: PAINLEVEL: WORST PAIN (10)

## 2023-06-20 NOTE — LETTER
"6/20/2023       RE: Marli Ponce  [address] [address]       Dear Colleague,    Thank you for referring your patient, Marli Ponce, to the [facility] NEUROSURGERY CLINIC [place] at [facility]. Please see a copy of my visit note below.        Neurosurgery Clinic Note    Chief Complaint: low back pain with left leg pain    History of Present Illness:  Marli Ponce is a 82 year old female returning to clinic today for her chief complaint of low back pain with LLE radiculopathy. She underwent left L4-5 and L5-S1 hemilaminectomies and L5 S1 foraminotomies with Dr. Gonsalez 5/22/23. Unfortunately, her pain did not improve after surgery and in fact seems to have worsened. Any movement aggrivates her pain, which starts in the low back, radiates across her left buttock and down the lateral side of the left leg into the top of her left foot. MRI was obtained 6/2/23 which showed good decompression of the L5 and S1 nerve roots but showed some questionable stenosis affecting the left L5 nerve root. No weakness, some numbness in the bottom of her L foot stable from preop. She was recently seen by Dr. Gonsalez at [facility] who recommended L5-S1 fusion with more aggressive decompression as the best next step in attempt to address her continued pain. She presents today for second opinion.    She was last seen in our clinic in March 2023, at which time Dr. Hernandez recommended a diagnostic and therapeutic L5 nerve block which could potentially be followed by a foraminotomy. The patient however refused to undergo the injection but now is reconsidering given her worsening pain.      IMAGING  Imaging independently reviewed.  Lumbar MRI 6/2023 -  Appears to be an adequate decompression of left L5 and S1 nerve roots.    Lumbar MRI 3/20/23 -   \"Impression: Multilevel lumbar spondylosis without high-grade spinal  canal stenosis or high-grade neural foraminal " "narrowing. Findings are  relatively unchanged from prior study.\"      ASSESSMENT & PLAN:  Marli Ponce is a 82 year old female with recurrent low back and left L5 type radiculopathy which was not improved with what appears to be an adequate decompression of L5 S1 nerve roots. This may be piriformis syndrome; we will further investigate this possibility and simultaneously attempt diagnostic and therapeutic L5 injection as previously proposed.    - Piriformis ultrasound  - Cyclobenzabrine  - Continie prn tylenol  - Refer for L L5 nerve root block    Patient discussed and seen with Dr. Hernandez.      Patient and her  are in agreement with the plan and had no further questions at this time.     Shaneka Francisco MD, PhD  PGY-1 Neurosurgery    Please contact neurosurgery resident on call with questions.    Dial * * *[phone], enter [phone] when prompted.   I have seen this patient with the resident and formulated a plan and agree with this note.  AMP       Again, thank you for allowing me to participate in the care of your patient.      Sincerely,    Elza Hernandez MD      "

## 2023-06-20 NOTE — PROGRESS NOTES
"    Neurosurgery Clinic Note    Chief Complaint: low back pain with left leg pain    History of Present Illness:  Marli Ponce is a 82 year old female returning to clinic today for her chief complaint of low back pain with LLE radiculopathy. She underwent left L4-5 and L5-S1 hemilaminectomies and L5 S1 foraminotomies with Dr. Gonsalez 5/22/23. Unfortunately, her pain did not improve after surgery and in fact seems to have worsened. Any movement aggrivates her pain, which starts in the low back, radiates across her left buttock and down the lateral side of the left leg into the top of her left foot. MRI was obtained 6/2/23 which showed good decompression of the L5 and S1 nerve roots but showed some questionable stenosis affecting the left L5 nerve root. No weakness, some numbness in the bottom of her L foot stable from preop. She was recently seen by Dr. Gonsalez at [facility] who recommended L5-S1 fusion with more aggressive decompression as the best next step in attempt to address her continued pain. She presents today for second opinion.    She was last seen in our clinic in March 2023, at which time Dr. Hernandez recommended a diagnostic and therapeutic L5 nerve block which could potentially be followed by a foraminotomy. The patient however refused to undergo the injection but now is reconsidering given her worsening pain.      IMAGING  Imaging independently reviewed.  Lumbar MRI 6/2023 -  Appears to be an adequate decompression of left L5 and S1 nerve roots.    Lumbar MRI 3/20/23 -   \"Impression: Multilevel lumbar spondylosis without high-grade spinal  canal stenosis or high-grade neural foraminal narrowing. Findings are  relatively unchanged from prior study.\"      ASSESSMENT & PLAN:  Marli Ponce is a 82 year old female with recurrent low back and left L5 type radiculopathy which was not improved with what appears to be an adequate decompression of L5 S1 nerve roots. This may be piriformis syndrome; we will " further investigate this possibility and simultaneously attempt diagnostic and therapeutic L5 injection as previously proposed.    - Piriformis ultrasound  - Cyclobenzabrine  - Continie prn tylenol  - Refer for L L5 nerve root block    Patient discussed and seen with Dr. Hernandez.      Patient and her  are in agreement with the plan and had no further questions at this time.     Shaneka Francisco MD, PhD  PGY-1 Neurosurgery    Please contact neurosurgery resident on call with questions.    Dial * * [phone], enter [phone] when prompted.   I have seen this patient with the resident and formulated a plan and agree with this note.  AMP

## 2023-06-20 NOTE — PATIENT INSTRUCTIONS
Dr Hernandez has entered referrals to Pain Management for an epidural injection and to Sports Medicine for an ultrasound evaluation for paraformis syndrome.  In addition we have sent and Rx for a muscle relaxant to your Pharmacy.

## 2023-06-25 DIAGNOSIS — M54.16 LUMBAR RADICULAR SYNDROME: Primary | ICD-10-CM

## 2023-06-26 ENCOUNTER — TELEPHONE (OUTPATIENT)
Dept: PALLIATIVE MEDICINE | Facility: CLINIC | Age: 83
End: 2023-06-26

## 2023-06-26 PROBLEM — M54.16 LUMBAR RADICULAR SYNDROME: Status: ACTIVE | Noted: 2023-06-25

## 2023-06-26 NOTE — TELEPHONE ENCOUNTER
Scheduled injection to be with Dr. Skelton on 7/6 in [facility].     Confirmed with patient they will need a . Confirmed with patient the pre-op nurses will call about 1 week before procedure to confirm arrival/start time and review further instructions.    Patient has no further questions/concerns at this time.      Delfina Gongora on 06/26/23 at 11:17 AM/  Surgery Scheduling  Ph: [phone]

## 2023-07-05 DIAGNOSIS — G57.00 PIRIFORMIS SYNDROME: ICD-10-CM

## 2023-07-05 DIAGNOSIS — M54.16 LUMBAR RADICULAR SYNDROME: Primary | ICD-10-CM

## 2023-07-05 DIAGNOSIS — R29.898 OTHER SYMPTOMS AND SIGNS INVOLVING THE MUSCULOSKELETAL SYSTEM: ICD-10-CM

## 2023-07-06 ENCOUNTER — HOSPITAL ENCOUNTER (OUTPATIENT)
Facility: AMBULATORY SURGERY CENTER | Age: 83
Discharge: HOME | End: 2023-07-06
Payer: MEDICARE

## 2023-07-06 ENCOUNTER — ANCILLARY PROCEDURE (OUTPATIENT)
Dept: GENERAL RADIOLOGY | Facility: CLINIC | Age: 83
End: 2023-07-06
Payer: MEDICARE

## 2023-07-06 VITALS
HEART RATE: 65 BPM | OXYGEN SATURATION: 98 % | SYSTOLIC BLOOD PRESSURE: 182 MMHG | DIASTOLIC BLOOD PRESSURE: 82 MMHG | RESPIRATION RATE: 16 BRPM | TEMPERATURE: 97.1 F

## 2023-07-06 DIAGNOSIS — R52 PAIN: ICD-10-CM

## 2023-07-06 PROCEDURE — G8918 PT W/O PREOP ORDER IV AB PRO: HCPCS

## 2023-07-06 PROCEDURE — 62323 NJX INTERLAMINAR LMBR/SAC: CPT

## 2023-07-06 PROCEDURE — G8907 PT DOC NO EVENTS ON DISCHARG: HCPCS

## 2023-07-06 RX ORDER — LIDOCAINE HYDROCHLORIDE 10 MG/ML
INJECTION, SOLUTION EPIDURAL; INFILTRATION; INTRACAUDAL; PERINEURAL PRN
Status: DISCONTINUED | OUTPATIENT
Start: 2023-07-06 | End: 2023-07-06 | Stop reason: HOSPADM

## 2023-07-06 RX ORDER — METHYLPREDNISOLONE ACETATE 40 MG/ML
INJECTION, SUSPENSION INTRA-ARTICULAR; INTRALESIONAL; INTRAMUSCULAR; SOFT TISSUE PRN
Status: DISCONTINUED | OUTPATIENT
Start: 2023-07-06 | End: 2023-07-06 | Stop reason: HOSPADM

## 2023-07-06 RX ORDER — IOPAMIDOL 408 MG/ML
INJECTION, SOLUTION INTRATHECAL PRN
Status: DISCONTINUED | OUTPATIENT
Start: 2023-07-06 | End: 2023-07-06 | Stop reason: HOSPADM

## 2023-07-06 NOTE — DISCHARGE INSTRUCTIONS
PAIN INJECTION HOME CARE INSTRUCTIONS  Activity  -You may resume most normal activity levels with the exception of strenuous activity. It may help to move in ways that hurt before the injection, to see if the pain is still there, but do not overdo it.     -DO NOT remove bandaid for 24 hours  -DO NOT shower for 24 hours      Pain  -You may feel immediate pain relief and numbness for a period of time after the injection. This may indicate the medication has reached the right spot.  -Your pain may return after this short pain-free period, or may even be a little worse for a day or two. It may be caused by needle irritation or by the medication itself. The medications usually take two or three days to start working, but can take as long as a week.    -You may use an ice pack for 20 minutes every 2 hours for the first 24 hours  -You may use a heating pad after the first 24 hours  -You may use Tylenol (acetaminophen) every 4 hours or other pain medicines as directed by your physician      DID YOU RECEIVE STEROIDS TODAY?  Yes    Common side effects of steroids:  Not everyone will experience corticosteroid side effects. If side effects are experienced, they will gradually subside in the 7-10 day period following an injection. Most common side effects include:  -Flushed face and/or chest  -Feeling of warmth, particularly in the face but could be an overall feeling of warmth  -Increased blood sugar in diabetic patients  -Menstrual irregularities my occur. If taking hormone-based birth control an alternate method of birth control is recommended  -Sleep disturbances and/or mood swings are possible  -Leg cramps    PLEASE KEEP TRACK OF YOUR SYMPTOMS AND NOTE ANY CHANGES FOR YOUR DOCTOR.       Please contact us if you have:  -Severe pain  -Fever more than 101.5 degrees Fahrenheit  -Signs of infection at the injection site (redness, swelling, or drainage)      FOR PAIN CENTER PATIENTS:  If you have questions, please contact the  Pain Clinic at [phone] Option 1 between the hours of 7:00 am and 3:00 pm Monday through Friday. After office hours you can contact the on call provider by dialing [phone]. If you need immediate attention, we recommend that you go to a hospital emergency room or dial [phone]. If you need to Fax information, the number is [phone].      FOR PM&R PATIENTS:  For patients seen by the PM and R service, please call [phone]. (Monday through Friday 8a-5p.  After business hours and weekends call [phone] and ask for the PM and R doctor on call). If you need to fax a pain diary to PM&R the fax number is [phone]. If you are unable to fax, uploading to [employer] is encouraged, then send to provider. If you have procedure scheduling questions please call [phone] Option #2.

## 2023-07-18 DIAGNOSIS — K21.9 GASTROESOPHAGEAL REFLUX DISEASE WITHOUT ESOPHAGITIS: ICD-10-CM

## 2023-07-18 NOTE — OP NOTE
.Interlaminar Epidural Steroid Injection  The patient s identity, the procedure to be performed and the specific site of the procedure was verified in accordance with [facility] Protocol.    Diagnosis: Lumbar Radicular Syndrome  Pre-procedure Pain Score: 8/10    Procedure Note:    Informed consent was obtained.  The patient was placed comfortably into a prone position and was then prepped and draped in a sterile fashion.  There was no evidence of infection at the site of needle insertion.  The skin was anesthetized with 1% lidocaine and a tuohy needle was advanced under fluoroscopic guidance into the epidural space using a loss of resistance technique.  CSF was not aspirated, blood was not aspirated, paresthesia was not noted.  Position was confirmed with radio-opaque contrast.  The patient tolerated the procedure without complications.  The patient was observed for 30 minutes and was then released with post-procedure instructions.    The patient was given discharge instructions and verbalizes understanding, including understanding of those signs and symptoms that would require emergency care.     Level:L5/S1  Laterality: central     Needle Type:   20g touh        Medication:  40mg methylprednisolone  3ml Normal Saline,   1ml 0.25% Bupivacaine        Post Procedure Pain Score: 0/10      Counseling: Greater than 50% of this patient visit was spent in counseling the patient regarding the treatment of their pain, coordinating their overall treatment plan and assessing their progress.

## 2023-07-21 ENCOUNTER — TELEPHONE (OUTPATIENT)
Dept: NEUROSURGERY | Facility: CLINIC | Age: 83
End: 2023-07-21
Payer: MEDICARE

## 2023-07-21 NOTE — TELEPHONE ENCOUNTER
[facility] Call Center    Phone Message    May a detailed message be left on voicemail: yes     Reason for Call: Other: Orthopedic calling to clarify orders for ultrasound and confirm if they want Pt to do PT therapy there and do any other diagnostic testing. Pt is there right now so please call back to advise asap    Action Taken: Message routed to:  [facility] Neurosurgery    Travel Screening: Not Applicable

## 2023-07-21 NOTE — TELEPHONE ENCOUNTER
omeprazole (PRILOSEC) 20 MG DR capsule     PPI Protocol Passed         4/14/2023  [facility] [place]   Noemi Collins MD  Internal Medicine

## 2023-07-21 NOTE — TELEPHONE ENCOUNTER
Writer returned call to PT. PT does not have the ability to person and ultrasound. Neurosurgery will reach out to patient to assist in coordinating ultrasound.     Geeta Real LPN  Neurosurgery

## 2023-07-24 DIAGNOSIS — E03.9 HYPOTHYROIDISM, UNSPECIFIED TYPE: ICD-10-CM

## 2023-07-26 DIAGNOSIS — I10 ESSENTIAL HYPERTENSION: ICD-10-CM

## 2023-07-26 RX ORDER — LISINOPRIL 20 MG/1
20 TABLET ORAL DAILY
Qty: 90 TABLET | Refills: 2 | Status: SHIPPED | OUTPATIENT
Start: 2023-07-26 | End: 2024-02-29

## 2023-07-26 NOTE — TELEPHONE ENCOUNTER
lisinopril (ZESTRIL) 20 MG tablet  Last Written Prescription Date:   7/18/2022  Last Fill Quantity: 90,   # refills: 3  Last Office Visit :  4/14/2023  Future Office visit:   7/27/2023  Routing refill request to provider for review/approval because:  Abnormal B/P  Change med?  Change dose?  Add med?  Follow up B/P check?  Refer to clinic for review     BP Readings from Last 3 Encounters:   07/06/23 (!) 182/82   06/20/23 (!) 191/91   03/23/23 (!) 183/74     Specimen:  Blood 5/23/2023  Care Everywhere Tab   Ref Range & Units 2 mo ago Comments   CO2 22 - 30 mEq/L 24     Glucose 70 - 100 mg/dL 93     BUN 8 - 23 mg/dL 12     Creatinine 0.50 - 1.00 mg/dL 0.84     Calcium 8.8 - 10.2 mg/dL 8.6 Low      eGFR, High >=60 ml/min/1.73m2 74  Calculated using CKD-EPI equation   Sodium 135 - 148 mEq/L 138     Potassium 3.5 - 5.3 mEq/L 3.8     Chloride 92 - 108 mEq/L 106     eGFR, Low >=60 ml/min/1.73m2 64  Calculated using CKD-EPI equation   AnGap 8 - 16 mEq/L 8     Resulting Agency  [facility]    Specimen Collected: 05/23/23  6:14 AM       Krissy Sheppard RN  Central Triage Red Flags/Med Refills     ACE Inhibitors (Including Combos) Protocol Failed 07/26/2023 07:31 AM   Protocol Details  Blood pressure under 140/90 in past 12 months    Normal serum creatinine on file in past 12 months    Normal serum potassium on file in past 12 months

## 2023-07-26 NOTE — TELEPHONE ENCOUNTER
Pt was last seen in clinic on 4/14/23. Pt last had lisinopril (ZESTRIL) 20 MG tablet  refilled on 7/18/22 for a 360 day supply by PCP. Due for refill. Medication refill sent to pharmacy.     CAMILA BRAVO RN on 7/26/2023 at 7:59 AM

## 2023-07-27 ENCOUNTER — ANCILLARY PROCEDURE (OUTPATIENT)
Dept: MAMMOGRAPHY | Facility: CLINIC | Age: 83
End: 2023-07-27
Attending: INTERNAL MEDICINE
Payer: MEDICARE

## 2023-07-27 ENCOUNTER — LAB (OUTPATIENT)
Dept: LAB | Facility: CLINIC | Age: 83
End: 2023-07-27
Payer: MEDICARE

## 2023-07-27 ENCOUNTER — OFFICE VISIT (OUTPATIENT)
Dept: INTERNAL MEDICINE | Facility: CLINIC | Age: 83
End: 2023-07-27
Payer: MEDICARE

## 2023-07-27 VITALS
WEIGHT: 121.1 LBS | BODY MASS INDEX: 23.78 KG/M2 | HEIGHT: 60 IN | HEART RATE: 52 BPM | OXYGEN SATURATION: 98 % | DIASTOLIC BLOOD PRESSURE: 80 MMHG | SYSTOLIC BLOOD PRESSURE: 176 MMHG

## 2023-07-27 DIAGNOSIS — M81.0 AGE RELATED OSTEOPOROSIS, UNSPECIFIED PATHOLOGICAL FRACTURE PRESENCE: ICD-10-CM

## 2023-07-27 DIAGNOSIS — E03.9 HYPOTHYROIDISM, UNSPECIFIED TYPE: ICD-10-CM

## 2023-07-27 DIAGNOSIS — I10 ESSENTIAL HYPERTENSION: ICD-10-CM

## 2023-07-27 DIAGNOSIS — M54.16 LUMBAR RADICULITIS: ICD-10-CM

## 2023-07-27 DIAGNOSIS — E78.49 OTHER HYPERLIPIDEMIA: ICD-10-CM

## 2023-07-27 DIAGNOSIS — M81.0 AGE RELATED OSTEOPOROSIS, UNSPECIFIED PATHOLOGICAL FRACTURE PRESENCE: Primary | ICD-10-CM

## 2023-07-27 DIAGNOSIS — Z12.31 VISIT FOR SCREENING MAMMOGRAM: ICD-10-CM

## 2023-07-27 LAB
ALBUMIN SERPL BCG-MCNC: 4.4 G/DL (ref 3.5–5.2)
ALP SERPL-CCNC: 81 U/L (ref 35–104)
ALT SERPL W P-5'-P-CCNC: 17 U/L (ref 0–50)
ANION GAP SERPL CALCULATED.3IONS-SCNC: 10 MMOL/L (ref 7–15)
AST SERPL W P-5'-P-CCNC: 23 U/L (ref 0–45)
BILIRUB SERPL-MCNC: 0.7 MG/DL
BUN SERPL-MCNC: 14.9 MG/DL (ref 8–23)
CALCIUM SERPL-MCNC: 9.9 MG/DL (ref 8.8–10.2)
CHLORIDE SERPL-SCNC: 102 MMOL/L (ref 98–107)
CHOLEST SERPL-MCNC: 299 MG/DL
CREAT SERPL-MCNC: 0.98 MG/DL (ref 0.51–0.95)
DEPRECATED CALCIDIOL+CALCIFEROL SERPL-MC: 79 UG/L (ref 20–75)
DEPRECATED HCO3 PLAS-SCNC: 28 MMOL/L (ref 22–29)
GFR SERPL CREATININE-BSD FRML MDRD: 57 ML/MIN/1.73M2
GLUCOSE SERPL-MCNC: 99 MG/DL (ref 70–99)
HDLC SERPL-MCNC: 128 MG/DL
LDLC SERPL CALC-MCNC: 149 MG/DL
NONHDLC SERPL-MCNC: 171 MG/DL
POTASSIUM SERPL-SCNC: 4.2 MMOL/L (ref 3.4–5.3)
PROT SERPL-MCNC: 6.8 G/DL (ref 6.4–8.3)
SODIUM SERPL-SCNC: 140 MMOL/L (ref 136–145)
TRIGL SERPL-MCNC: 111 MG/DL
TSH SERPL DL<=0.005 MIU/L-ACNC: 0.65 UIU/ML (ref 0.3–4.2)

## 2023-07-27 PROCEDURE — 77067 SCR MAMMO BI INCL CAD: CPT | Mod: GC | Performed by: RADIOLOGY

## 2023-07-27 PROCEDURE — 99000 SPECIMEN HANDLING OFFICE-LAB: CPT | Performed by: PATHOLOGY

## 2023-07-27 PROCEDURE — G0439 PPPS, SUBSEQ VISIT: HCPCS | Performed by: INTERNAL MEDICINE

## 2023-07-27 PROCEDURE — 77063 BREAST TOMOSYNTHESIS BI: CPT | Mod: GC | Performed by: RADIOLOGY

## 2023-07-27 PROCEDURE — 80061 LIPID PANEL: CPT | Performed by: PATHOLOGY

## 2023-07-27 PROCEDURE — 84443 ASSAY THYROID STIM HORMONE: CPT | Performed by: PATHOLOGY

## 2023-07-27 PROCEDURE — 80053 COMPREHEN METABOLIC PANEL: CPT | Performed by: PATHOLOGY

## 2023-07-27 PROCEDURE — 36415 COLL VENOUS BLD VENIPUNCTURE: CPT | Performed by: PATHOLOGY

## 2023-07-27 PROCEDURE — 82306 VITAMIN D 25 HYDROXY: CPT | Performed by: INTERNAL MEDICINE

## 2023-07-27 ASSESSMENT — ENCOUNTER SYMPTOMS
MYALGIAS: 0
NECK PAIN: 0
JOINT SWELLING: 0
BACK PAIN: 1

## 2023-07-27 NOTE — NURSING NOTE
Marli Ponce is a 82 year old female patient that presents today in clinic for the following:    Chief Complaint   Patient presents with    Medicare Visit    Physical    Refill Request     Pt needing refills for all medications     The patient's allergies and medications were reviewed as noted. A set of vitals were recorded as noted without incident: BP (!) 176/80 (BP Location: Right arm, Patient Position: Sitting, Cuff Size: Adult Small)   Pulse 52   Ht 1.524 m (5')   Wt 54.9 kg (121 lb 1.6 oz)   SpO2 98%   BMI 23.65 kg/m  . The patient does not have any other questions for the provider.    Olaf Hazel, EMT 10:02 AM on 7/27/2023

## 2023-07-27 NOTE — PROGRESS NOTES
Medicare Annual Wellness Questionnaire:  This 82 year old year old female presents for a Medicare Wellness Exam.    Patient Care Team         Relationship Specialty Notifications Start End    Noemi Collins MD PCP - General Internal Medicine  7/18/22     Phone: [phone] Fax: [phone]         [address] [place] [address]    Karma Bustillos MD MD Ophthalmology  3/11/14     Phone: [phone] Fax: [phone]         [address] [facility] [address] [facility] [address]    Karina Barros MD MD Internal Medicine  4/29/15     primary care clinic    Phone: [phone] Pager: [phone] Fax: [phone]        [address] [facility] [address] [facility] [address]    Cami Hearn MD MD OB/Gyn  5/18/15     Phone: [phone] Fax: [phone]         [address]  [facility] [address]    Adelina Prado MD MD Dermatology  6/26/15     Phone: [phone] Fax: [phone]         [address] [facility] [address] [facility] [address]    Negro Williamson MD MD Orthopedics  4/18/16     Phone: [phone] Fax: [phone]         [address] [facility] [address]    Elza Hernandez MD Assigned Neuroscience Provider   11/21/21     Phone: [phone] Fax: [phone]         [address] [place], [place] [id number] [facility] [address]    Noemi Collins MD Assigned PCP   1/9/22     Phone: [phone] Fax: [phone]         [address] [place] [address]            Fall Risk Assessment:  Have you fallen 2 or more times in the last year?  No    How many times were you injured due to a fall in the last year?  0    PHQ-2:  Over the last 2 weeks, how often have you been bothered by feeling down, depressed, or hopeless?  Not at all (0)    Over the last 2 weeks, how often have you had little interest or pleasure in doing things?  Not at all (0)     Social History:  What is your marital status?      Who lives in your household?  My     Does your  "home have loose rugs in the hallway:     No    Does your home have grab bars in the bathroom:    Yes     Does your home have handrails on the stairs?  Yes     Does your home have poorly lit areas?    No    Do you feel threatened or controlled by a partner, ex-partner or anyone in your life?   No    Has anyone hurt you physically, for example by pushing, hitting, slapping or kicking you or forcing you to have sex?   No    Do you need help with the phone, transportation, shopping, preparing meals, housework, laundry, medications or managing money?   No    Sexual Health:  Are you sexually active?    No    If yes, with men, women, or both?   N/A    If yes, how many partners?  N/A    If yes, are you using condoms?    N/A    Have you had any sexually transmitted infections in the last year?   Pt did not complete    Do you have any sexual concerns?    Pt did not complete    Women Only:  Women: What year did you stop having periods (approximate age)?  Pt did not complete    Women: Any vaginal bleeding in the last year?    No    Women: Have you ever had an abnormal Pap smear?    No    General Health Assessment:  Have you noticed any hearing difficulties?   No    Do you wear hearing aids?   No    Have you seen a hearing professional such as an audiologist in the last 1 year?   No    Do you have vision difficulty?    No    Do you wear glasses or contacts?   No    Have you seen an eye doctor in the last 1 year?   Yes     How many servings of fruits and vegetables do you eat a day?  Fruit: pt wrote \"yes\"  Vegetables: pt wrote \"yes\"    How often do you exercise in a week?  5 days    How long and what kind of exercise do you do?  30 minutes    Tobacco and Alcohol History:  Do you use tobacco/nicotine products?    No    If yes, please list the method of use and average weekly consumption?  N/A    Do you use any other drugs?   No         Do you drink alcohol?   Yes     If you drink alcohol, how many drinks per week?  1    Advance " Directive:  Have you completed an Advance Directives document?  No    If yes, have you given a copy to the clinic?   No    Do you need information on Advance Directives?   No    Olaf Hazel EMT at 10:50 AM on 7/27/2023  Answers submitted by the patient for this visit:  Symptoms you have experienced in the last 30 days (Submitted on 7/27/2023)  General Symptoms: No  Skin Symptoms: No  HENT Symptoms: No  EYE SYMPTOMS: No  HEART SYMPTOMS: No  LUNG SYMPTOMS: No  INTESTINAL SYMPTOMS: No  URINARY SYMPTOMS: No  GYNECOLOGIC SYMPTOMS: No  BREAST SYMPTOMS: No  SKELETAL SYMPTOMS: Yes  BLOOD SYMPTOMS: No  NERVOUS SYSTEM SYMPTOMS: No  MENTAL HEALTH SYMPTOMS: No  Please answer the questions below to tell us what condition you are experiencing: (Submitted on 7/27/2023)  Back pain: Yes  Muscle aches: No  Neck pain: No  Swollen joints: No

## 2023-07-27 NOTE — PROGRESS NOTES
History of Present Illness:  Ms. Ponce is a 82 year old female who presents for  Chief Complaint   Patient presents with     Medicare Visit     Physical     Refill Request     Pt needing refills for all medications       Marli Ponce is a melania 83yo woman with a history of lumbar radicular syndrome, hyperlipidemia, hypothyroidism, HTN, primary osteoarthritis and chronic gastritis that presents for her annual exam. She is doing well today and accompanied by her .     She states she is experiencing increased lumbar pain in recent months despite undergoing the lumbar laminectomy and microdiscectomy on 5/22/23 and receiving a subsequent epidural steroid injection on 7/6/2023. Pain is in lower back, radiating to her left buttock and down her left leg. Tender to touch. States the pain is worse with walking quickly, walking long distances, and sitting/standing for prolonged periods of time. Occasional use of Flexeril or Tylenol PM at bedtime. Has not found pain relief with any treatments.    High BP today, she states daily readings at home are consistently 130/80s.    Mammogram completed this morning. No concerns with any of her current medications today.    Plans to visit family in [facility] in Sept.     Review of external notes as documented above                   A detailed Review of Systems was performed, verified and is negative except as documented in the HPI.  All health questionnaires were reviewed, verified and relevant information documented above.      Past Medical History:  Past Medical History:   Diagnosis Date     Cataract      Hyperlipidemia LDL goal < 130      Hypertension     white coat; home blood pressure monitoring April 2016  average systolic blood pressure approximately 115.     Hypothyroidism      Low bone density 8/16/2017    FRAX tenure probability of fracture, major osteoporotic: 14.6%; hip fracture 4% (increased) Plan:  Alendronate 70 mg weekly; trial basis to assess for reflux  esophagitis     Ocular hypertension      Primary open-angle glaucoma     adv       Active Meds:  Current Outpatient Medications   Medication     ASPIRIN 81 PO     cyclobenzaprine (FLEXERIL) 5 MG tablet     levothyroxine (SYNTHROID/LEVOTHROID) 50 MCG tablet     lisinopril (ZESTRIL) 20 MG tablet     Multiple Vitamin (MULTIVITAMIN) per tablet     omeprazole (PRILOSEC) 20 MG DR capsule     travoprost CARMELA FREE (TRAVATAN Z) 0.004 % ophthalmic solution     No current facility-administered medications for this visit.        Allergies:  Reviewed, refer to EMR    Relevant Social History:  Social History     Tobacco Use     Smoking status: Never     Smokeless tobacco: Never   Substance Use Topics     Alcohol use: Yes     Alcohol/week: 0.0 standard drinks of alcohol     Comment: rare tequilla     Drug use: No        Physical Exam:  Vitals: BP (!) 176/80 (BP Location: Right arm, Patient Position: Sitting, Cuff Size: Adult Small)   Pulse 52   Ht 1.524 m (5')   Wt 54.9 kg (121 lb 1.6 oz)   SpO2 98%   BMI 23.65 kg/m    Constitutional: Alert, oriented, pleasant, no acute distress. Asking appropriate questions.  Head: Normocephalic, atraumatic  Eyes: Extra-ocular movements intact, pupils equally round bilaterally, no scleral icterus  Cardiovascular: Regular rate and rhythm, no murmurs, rubs or gallops, peripheral pulses full/symmetric  Respiratory: Good air movement bilaterally, lungs clear, no wheezes/rales/rhonchi  GI: Abdomen soft, bowel sounds present, nondistended, nontender, no organomegaly or masses, no rebound/guarding  Musculoskeletal: No edema, normal muscle tone, normal gait  Neurologic: Alert and oriented, cranial nerves 2-12 intact, grossly non-focal  Skin: No rashes/lesions, seborrheic keratosis on left temple near hairline.   Psychiatric: normal mentation, affect and mood.      Diagnostics:  Labs reviewed in Epic          Assessment and Plan:  Marli was seen today for medicare visit, physical and refill  request.    Diagnoses and all orders for this visit:    Age related osteoporosis, unspecified pathological fracture presence  -     Vitamin D Deficiency; Future  -  Discussed potential addition of fosamax for bone resorption. Need to discuss with neurosurgery and pain medicine as to next treatment plans for lumbar radiculitis.     Hypothyroidism, unspecified type  -     TSH with free T4 reflex; Future    Essential hypertension  -     Comprehensive metabolic panel (BMP + Alb, Alk Phos, ALT, AST, Total. Bili, TP); Future  -  Continue lisinopril, daily BP checks at home     Lumbar radiculitis  - Continue working with pain medicine and neurosurgery  -     Other hyperlipidemia  -     Lipid panel reflex to direct LDL Fasting; Future          Patient seen and discussed with Dr. Collins.    Sonja Majano, MS3     I was present with the medical student who participated in the service and in the documentation of the note. I have verified the history and personally performed the physical exam and medical decision making. I agree with the assessment and plan of care as documented in the note.  Noemi Collins MD  Internal Medicine    >30 minutes spent today performing chart review, history and exam, counseling, care coordination, documentation and further activities as noted above exclusive of any procedures or EKG interpretation         Answers submitted by the patient for this visit:  Symptoms you have experienced in the last 30 days (Submitted on 7/27/2023)  General Symptoms: No  Skin Symptoms: No  HENT Symptoms: No  EYE SYMPTOMS: No  HEART SYMPTOMS: No  LUNG SYMPTOMS: No  INTESTINAL SYMPTOMS: No  URINARY SYMPTOMS: No  GYNECOLOGIC SYMPTOMS: No  BREAST SYMPTOMS: No  SKELETAL SYMPTOMS: Yes  BLOOD SYMPTOMS: No  NERVOUS SYSTEM SYMPTOMS: No  MENTAL HEALTH SYMPTOMS: No  Please answer the questions below to tell us what condition you are experiencing: (Submitted on 7/27/2023)  Back pain: Yes  Muscle aches: No  Neck pain: No  Swollen  joints: No

## 2023-07-27 NOTE — TELEPHONE ENCOUNTER
LEVOTHYROXINE 0.05MG (50MCG) TAB      Last Written Prescription Date:  7/18/22  Last Fill Quantity: 90,   # refills: 3  Last Office Visit : 7/27/23  Future Office visit:  0    Routing refill request to provider for review/approval because:  today's note is not complete    Normal TSH on file in past 12 months        Recent Labs   Lab Test 07/27/23  1110   TSH 0.65

## 2023-07-28 RX ORDER — LEVOTHYROXINE SODIUM 50 UG/1
50 TABLET ORAL DAILY
Qty: 90 TABLET | Refills: 3 | Status: SHIPPED | OUTPATIENT
Start: 2023-07-28 | End: 2024-09-06

## 2023-07-28 NOTE — TELEPHONE ENCOUNTER
Pt was last seen in clinic on 7/27/23. Pt last had levothyroxine (SYNTHROID/LEVOTHROID) 50 MCG tablet  refilled on 7/18/22 for a 360 day supply by PCP. Due for refill. Medication refill sent to pharmacy.     CAMILA BRAVO RN on 7/28/2023 at 8:47 AM

## 2023-08-01 ENCOUNTER — MYC MEDICAL ADVICE (OUTPATIENT)
Dept: INTERNAL MEDICINE | Facility: CLINIC | Age: 83
End: 2023-08-01
Payer: MEDICARE

## 2023-08-01 DIAGNOSIS — E78.49 OTHER HYPERLIPIDEMIA: Primary | ICD-10-CM

## 2023-08-02 RX ORDER — ATORVASTATIN CALCIUM 10 MG/1
10 TABLET, FILM COATED ORAL DAILY
Qty: 90 TABLET | Refills: 3 | Status: SHIPPED | OUTPATIENT
Start: 2023-08-02 | End: 2024-04-10

## 2024-02-21 ENCOUNTER — NURSE TRIAGE (OUTPATIENT)
Dept: NURSING | Facility: CLINIC | Age: 84
End: 2024-02-21
Payer: MEDICARE

## 2024-02-21 ENCOUNTER — MYC MEDICAL ADVICE (OUTPATIENT)
Dept: INTERNAL MEDICINE | Facility: CLINIC | Age: 84
End: 2024-02-21
Payer: MEDICARE

## 2024-02-21 ENCOUNTER — TELEPHONE (OUTPATIENT)
Dept: INTERNAL MEDICINE | Facility: CLINIC | Age: 84
End: 2024-02-21
Payer: MEDICARE

## 2024-02-21 NOTE — TELEPHONE ENCOUNTER
[facility] Call Center    Phone Message    May a detailed message be left on voicemail: yes     Reason for Call: Symptoms or Concerns     If patient has red-flag symptoms, warm transfer to triage line    Current symptom or concern: blood pressure 240  did not state over what kept stating needed to talk to nurse    Symptoms have been present for:  1 day(s)    Has patient previously been seen for this? No            Are there any new or worsening symptoms? Yes: blood pressure kept heightning    Action Taken: Message routed to:  [facility]    Travel Screening: Not Applicable

## 2024-02-21 NOTE — TELEPHONE ENCOUNTER
Nurse Triage SBAR    Is this a 2nd Level Triage? YES, LICENSED PRACTITIONER REVIEW IS REQUIRED    Situation: high blood pressure, patient currently in bed.  B/P taken while on the phone with this RN was 190/108   home machine  Pt also sent a My chart as well today       Background:  [facility]  Outside Information  ED  2/20/2024  [facility]  Reason for Visit    Reason for Visit -  Reason Comments   Cough       Last Filed Vital Signs  Vital Sign Reading Time Taken    Blood Pressure 233/108 02/20/2024 2:45 PM CST    Your blood pressure was elevated at today's visit. I recommend that you have your blood pressure rechecked within the next 1-2 weeks and follow up with your primary provider if it does continue to be elevated.      Assessment:   Patient in the background answering questions, spouse calling  Reports no chest pain or difficulty breathing or weakness of arms or legs or blurred vision  She does complain of a headache to the back of her head  Denies n?V  She has not missed her B/P meds    Protocol Recommended Disposition:   Routing to [facility] for a call back    Recommendation:   If her headache gets worse, go to the ED  If she would develop weakness or N/T to the arms or legs, tgo to ED  Vision changes   go to Ed  Pain gets bad   go to ED  N/V go to the ED    Routed to provider      Reason for Disposition   Systolic BP >= 180 OR Diastolic >= 110    Additional Information   Negative: Systolic BP >= 180 OR Diastolic >= 110, and missed most recent dose of blood pressure medication   Negative: BP Systolic BP >= 140 OR Diastolic >= 90 and postpartum (from 0 to 6 weeks after delivery)   Negative: Systolic BP >= 160 OR Diastolic >= 100, and any cardiac or neurologic symptoms (e.g., chest pain, difficulty breathing, unsteady gait, blurred vision)   Negative: Patient sounds very sick or weak to the triager   Negative: Pregnant > 20 weeks or postpartum (< 6 weeks  "after delivery) and new hand or face swelling   Negative: Pregnant > 20 weeks and BP > 140/90   Negative: Sounds like a life-threatening emergency to the triager    Answer Assessment - Initial Assessment Questions  1. BLOOD PRESSURE: \"What is the blood pressure?\" \"Did you take at least two measurements 5 minutes apart?\"      235/108      and just taken  190/108     (Spouse is a retired cardiac surgeon from the [employer].)   2. ONSET: \"When did you take your blood pressure?\"      15 minutes ago and then just now.  Pt laying in bed   190/108  3. HOW: \"How did you obtain the blood pressure?\" (e.g., visiting nurse, automatic home BP monitor)      Home machine  4. HISTORY: \"Do you have a history of high blood pressure?\"      yes  5. MEDICATIONS: \"Are you taking any medications for blood pressure?\" \"Have you missed any doses recently?\"      Yes,  not missed  6. OTHER SYMPTOMS: \"Do you have any symptoms?\" (e.g., headache, chest pain, blurred vision, difficulty breathing, weakness)      Headache back of the head, no blurred vision,  no difficulty breathing, no arm or leg weakness  7. PREGNANCY: \"Is there any chance you are pregnant?\" \"When was your last menstrual period?\"      N/a    Protocols used: High Blood Pressure-A-OH    "

## 2024-02-21 NOTE — TELEPHONE ENCOUNTER
RN called the patient and spoke to the patient and the patient's spouse (CBO approved).     The patient and her spouse relay that the patient has had high blood pressure readings in the 180s-190s systolic. Per the patient and her spouse, the patient was seen in the ED yesterday for evaluation of a cough and was noted to be hypertensive at that time.     Today the patient was hypertensive in the morning in the 180s-190s systolic. The patient took 20 mg of Lisinopril this morning, which the patient's  states did not improve her blood pressure. The patient took another 20 mg of Lisinopril later today, which the patient's  states also did not help her blood pressure. The patient has also had a headache, which she says is improving but was still present at the time when the RN was speaking with the patient by telephone.      Given the patient's current headache and most recent blood pressure of 198/109 despite taking twice the prescribed daily amount of Lisinopril at home, the RN recommended that the patient seek care immediately in the ER for further evaluation and treatment. The patient's spouse asked that the RN inform Dr. Collins of the patient's symptoms. The RN agreed to pass this information along.     RN called the patient back at her mobile phone number. RN relayed that she would inform Dr. Collins of the patient's symptoms, but instructed the patient that Dr. Clolins was unavailable to help the patient and that the patient needed to be seen immediately in the emergency room. The patient stated she was en route to the emergency department when the RN called.

## 2024-02-22 NOTE — TELEPHONE ENCOUNTER
RN returned the patient's call and spoke with the patient and her  (CBO approved).     The patient and her  stated that they went to the ED yesterday evening at the RN's recommendation. The patient's  stated that the patient was eventually discharged from the ED with a 14 day prescription for amlodipine.     The patient states today she does not have a headache. The patient's  states that the patient's blood pressure this morning was 120/70. The patient and her  called the clinic today to ask about next steps. The RN offered the patient to schedule her the next available appointment, but the patient and her  indicated their preference to be seen by Dr. Collins specifically. The RN offered the patient an appointment with Dr. Wray on 02/29/2024 at 11:30 AM to follow-up on her recent ED visits; the patient agreed to this plan.

## 2024-02-22 NOTE — TELEPHONE ENCOUNTER
Duplicate encounter for same concerns/symptoms. RN called and spoke with the patient & patient's spouse on 02/21/2024 and recommended ED follow up for hypertension and headache--see telephone encounter dated 02/21/2024 for further details.

## 2024-02-22 NOTE — TELEPHONE ENCOUNTER
[facility]    Phone Message    May a detailed message be left on voicemail: yes     Reason for Call: The patient  was calling to let the care team know she was sent to the ER yesterday for hour they performed test and wanted to know what the next step are, I did encoruage a follow up appontment patient  dclined until care team follow up thank you.     Action Taken: Message routed to:  [facility] p[facility]    Travel Screening: Not Applicable

## 2024-02-29 ENCOUNTER — TELEPHONE (OUTPATIENT)
Dept: INTERNAL MEDICINE | Facility: CLINIC | Age: 84
End: 2024-02-29

## 2024-02-29 ENCOUNTER — OFFICE VISIT (OUTPATIENT)
Dept: INTERNAL MEDICINE | Facility: CLINIC | Age: 84
End: 2024-02-29
Payer: MEDICARE

## 2024-02-29 VITALS
DIASTOLIC BLOOD PRESSURE: 66 MMHG | HEART RATE: 50 BPM | BODY MASS INDEX: 24.65 KG/M2 | SYSTOLIC BLOOD PRESSURE: 141 MMHG | OXYGEN SATURATION: 99 % | WEIGHT: 126.2 LBS

## 2024-02-29 DIAGNOSIS — Z12.31 VISIT FOR SCREENING MAMMOGRAM: Primary | ICD-10-CM

## 2024-02-29 DIAGNOSIS — R03.0 ELEVATED BLOOD-PRESSURE READING, WITHOUT DIAGNOSIS OF HYPERTENSION: ICD-10-CM

## 2024-02-29 DIAGNOSIS — I10 ESSENTIAL HYPERTENSION: ICD-10-CM

## 2024-02-29 DIAGNOSIS — I16.0 HYPERTENSIVE URGENCY: Primary | ICD-10-CM

## 2024-02-29 PROCEDURE — 99215 OFFICE O/P EST HI 40 MIN: CPT | Performed by: INTERNAL MEDICINE

## 2024-02-29 RX ORDER — RESPIRATORY SYNCYTIAL VIRUS VACCINE 120MCG/0.5
0.5 KIT INTRAMUSCULAR ONCE
Qty: 1 EACH | Refills: 0 | Status: CANCELLED | OUTPATIENT
Start: 2024-02-29 | End: 2024-02-29

## 2024-02-29 RX ORDER — LISINOPRIL 40 MG/1
40 TABLET ORAL DAILY
Qty: 90 TABLET | Refills: 3 | Status: SHIPPED | OUTPATIENT
Start: 2024-02-29 | End: 2024-03-14

## 2024-02-29 RX ORDER — AMLODIPINE BESYLATE 5 MG/1
5 TABLET ORAL DAILY
COMMUNITY
End: 2024-03-14

## 2024-02-29 NOTE — PROGRESS NOTES
Triple Blood Pressure Check Visit    Marli Ponce presents in clinic today for blood pressure monitoring. The patient was greeted and escorted back to the room without incident. The patient's arm was elevated to heart level and they were instructed to uncross their legs. A triple blood pressure AHA was preformed wherein the blood pressure machine took Marli Ponce's blood pressure over the course of ten minutes. The following were the individual blood pressures that were observed at today's visit:    (1) 135/69     (2) 143/70    (3) 116/68    The average blood pressure from today's individual readings were 131/69. The results of today's visit were communicated to the ordering provider.    KYLE Barnett at 11:53 AM on 2/29/2024

## 2024-02-29 NOTE — PROGRESS NOTES
Assessment & Plan     Essential hypertension  Continue higher dose ACEi, okay to hold CCB for now, advised she obtain an upper arm BP monitor and gave parameters for BP at home  - lisinopril (ZESTRIL) 40 MG tablet; Take 1 tablet (40 mg) by mouth daily    Hypertensive urgency  Advised 24 hr monitor for further clarity, given significant white coat HTN and inconsistent home data, advised echo given irregularities on baseline EKG  - REVIEW OF HEALTH MAINTENANCE PROTOCOL ORDERS  - 24 Hour Blood Pressure Monitor - Adult; Future  - lisinopril (ZESTRIL) 40 MG tablet; Take 1 tablet (40 mg) by mouth daily  - Echocardiogram Complete; Future    Elevated blood-pressure reading, without diagnosis of hypertens  - 24 Hour Blood Pressure Monitor - Adult; Future  - Echocardiogram Complete; Future      I spent a total of 44 minutes on the day of the visit.   Time spent by me doing chart review, history and exam, documentation and further activities per the note    MED REC REQUIRED  Post Medication Reconciliation Status:           No follow-ups on file.    Cj Calles is a 83 year old, presenting for the following health issues:  Follow Up (ED follow up )      2/29/2024    11:31 AM   Additional Questions   Roomed by MR PEREZ     Had a cough illness, took mucinex, was seen in ER 2/20 with BP ~200/120, discharged home, had chest xray (clear)  A day later 2/21 had persistently high BP, along with HA and jaw pain, had mild trop leak, CT brain (clear), 240/110, EKG  She started taking amlodipine 5 mg in addition to her home med of lisinopril 20 mg  She has known hx of white coat HTN  Stopped amlodipine due to orthostasis but continued on lisinopril 40 mg  BP mildly elevated at home using wrist monitor  ROS: denies persistent HA, CP, dyspnea, she does endorse dizziness/orthostasis                      Objective    BP (!) 141/66 (BP Location: Right arm, Patient Position: Sitting, Cuff Size: Adult Regular)   Pulse 50   Wt 57.2  kg (126 lb 3.2 oz)   SpO2 99%   BMI 24.65 kg/m    Body mass index is 24.65 kg/m .  Physical Exam   GENERAL: alert and no distress  EYES: Eyes grossly normal to inspection, PERRL and conjunctivae and sclerae normal  RESP: lungs clear to auscultation - no rales, rhonchi or wheezes  CV: regular rate and rhythm, normal S1 S2, no S3 or S4, no murmur, click or rub, no peripheral edema   MS: no gross musculoskeletal defects noted, no edema          EKG: sinus zehra, mildly prolonged WA, TWI V1/2, Q III/aVF, mostly chronic changes    Signed Electronically by: Noemi Collins MD

## 2024-02-29 NOTE — PATIENT INSTRUCTIONS
Try checking your blood pressure at home.  Can do it serially, three times in a row, to overcome measurement anxiety. Can check 1-2 times weekly for a month or so.  Check while you are feeling relaxed, not after heavy exertion or stress.  If consistently over 140/90, please let me know.    Omron digital is a good brand to get.

## 2024-03-08 ENCOUNTER — TELEPHONE (OUTPATIENT)
Dept: INTERNAL MEDICINE | Facility: CLINIC | Age: 84
End: 2024-03-08
Payer: MEDICARE

## 2024-03-12 ENCOUNTER — TELEPHONE (OUTPATIENT)
Dept: INTERNAL MEDICINE | Facility: CLINIC | Age: 84
End: 2024-03-12

## 2024-03-14 ENCOUNTER — OFFICE VISIT (OUTPATIENT)
Dept: FAMILY MEDICINE | Facility: CLINIC | Age: 84
End: 2024-03-14
Payer: MEDICARE

## 2024-03-14 ENCOUNTER — E-CONSULT (OUTPATIENT)
Dept: MULTI SPECIALTY CLINIC | Facility: CLINIC | Age: 84
End: 2024-03-14
Payer: MEDICARE

## 2024-03-14 VITALS
HEART RATE: 51 BPM | WEIGHT: 124 LBS | BODY MASS INDEX: 24.22 KG/M2 | SYSTOLIC BLOOD PRESSURE: 119 MMHG | DIASTOLIC BLOOD PRESSURE: 73 MMHG | OXYGEN SATURATION: 98 %

## 2024-03-14 DIAGNOSIS — Z29.11 NEED FOR VACCINATION AGAINST RESPIRATORY SYNCYTIAL VIRUS: ICD-10-CM

## 2024-03-14 DIAGNOSIS — I10 ESSENTIAL HYPERTENSION: ICD-10-CM

## 2024-03-14 DIAGNOSIS — Z23 NEED FOR TDAP VACCINATION: ICD-10-CM

## 2024-03-14 DIAGNOSIS — I16.0 HYPERTENSIVE URGENCY: ICD-10-CM

## 2024-03-14 DIAGNOSIS — I10 HYPERTENSION, ESSENTIAL: Primary | ICD-10-CM

## 2024-03-14 LAB
ALBUMIN SERPL BCG-MCNC: 4.5 G/DL (ref 3.5–5.2)
ALBUMIN UR-MCNC: NEGATIVE MG/DL
ALP SERPL-CCNC: 81 U/L (ref 40–150)
ALT SERPL W P-5'-P-CCNC: 18 U/L (ref 0–50)
ANION GAP SERPL CALCULATED.3IONS-SCNC: 10 MMOL/L (ref 7–15)
APPEARANCE UR: CLEAR
AST SERPL W P-5'-P-CCNC: 23 U/L (ref 0–45)
BASOPHILS # BLD AUTO: 0.1 10E3/UL (ref 0–0.2)
BASOPHILS NFR BLD AUTO: 1 %
BILIRUB SERPL-MCNC: 0.6 MG/DL
BILIRUB UR QL STRIP: NEGATIVE
BUN SERPL-MCNC: 16.2 MG/DL (ref 8–23)
CALCIUM SERPL-MCNC: 9.9 MG/DL (ref 8.8–10.2)
CHLORIDE SERPL-SCNC: 94 MMOL/L (ref 98–107)
COLOR UR AUTO: NORMAL
CREAT SERPL-MCNC: 0.92 MG/DL (ref 0.51–0.95)
DEPRECATED HCO3 PLAS-SCNC: 26 MMOL/L (ref 22–29)
EGFRCR SERPLBLD CKD-EPI 2021: 61 ML/MIN/1.73M2
EOSINOPHIL # BLD AUTO: 0.1 10E3/UL (ref 0–0.7)
EOSINOPHIL NFR BLD AUTO: 2 %
ERYTHROCYTE [DISTWIDTH] IN BLOOD BY AUTOMATED COUNT: 11.9 % (ref 10–15)
GLUCOSE SERPL-MCNC: 96 MG/DL (ref 70–99)
GLUCOSE UR STRIP-MCNC: NEGATIVE MG/DL
HCT VFR BLD AUTO: 40.1 % (ref 35–47)
HGB BLD-MCNC: 13.8 G/DL (ref 11.7–15.7)
HGB UR QL STRIP: NEGATIVE
IMM GRANULOCYTES # BLD: 0 10E3/UL
IMM GRANULOCYTES NFR BLD: 0 %
KETONES UR STRIP-MCNC: NEGATIVE MG/DL
LEUKOCYTE ESTERASE UR QL STRIP: NEGATIVE
LYMPHOCYTES # BLD AUTO: 1.8 10E3/UL (ref 0.8–5.3)
LYMPHOCYTES NFR BLD AUTO: 49 %
MCH RBC QN AUTO: 30.2 PG (ref 26.5–33)
MCHC RBC AUTO-ENTMCNC: 34.4 G/DL (ref 31.5–36.5)
MCV RBC AUTO: 88 FL (ref 78–100)
MONOCYTES # BLD AUTO: 0.4 10E3/UL (ref 0–1.3)
MONOCYTES NFR BLD AUTO: 11 %
NEUTROPHILS # BLD AUTO: 1.4 10E3/UL (ref 1.6–8.3)
NEUTROPHILS NFR BLD AUTO: 37 %
NITRATE UR QL: NEGATIVE
NRBC # BLD AUTO: 0 10E3/UL
NRBC BLD AUTO-RTO: 0 /100
PH UR STRIP: 6.5 [PH] (ref 5–7)
PLATELET # BLD AUTO: 263 10E3/UL (ref 150–450)
POTASSIUM SERPL-SCNC: 4.9 MMOL/L (ref 3.4–5.3)
PROT SERPL-MCNC: 7.4 G/DL (ref 6.4–8.3)
RBC # BLD AUTO: 4.57 10E6/UL (ref 3.8–5.2)
SODIUM SERPL-SCNC: 130 MMOL/L (ref 135–145)
SP GR UR STRIP: 1 (ref 1–1.03)
UROBILINOGEN UR STRIP-MCNC: NORMAL MG/DL
WBC # BLD AUTO: 3.8 10E3/UL (ref 4–11)

## 2024-03-14 PROCEDURE — 99000 SPECIMEN HANDLING OFFICE-LAB: CPT | Performed by: PATHOLOGY

## 2024-03-14 PROCEDURE — 36415 COLL VENOUS BLD VENIPUNCTURE: CPT | Performed by: PATHOLOGY

## 2024-03-14 PROCEDURE — 82088 ASSAY OF ALDOSTERONE: CPT | Performed by: FAMILY MEDICINE

## 2024-03-14 PROCEDURE — 90480 ADMN SARSCOV2 VAC 1/ONLY CMP: CPT | Performed by: FAMILY MEDICINE

## 2024-03-14 PROCEDURE — 99215 OFFICE O/P EST HI 40 MIN: CPT | Mod: 25 | Performed by: FAMILY MEDICINE

## 2024-03-14 PROCEDURE — 84244 ASSAY OF RENIN: CPT | Mod: 90 | Performed by: PATHOLOGY

## 2024-03-14 PROCEDURE — 81003 URINALYSIS AUTO W/O SCOPE: CPT | Performed by: PATHOLOGY

## 2024-03-14 PROCEDURE — 91320 SARSCV2 VAC 30MCG TRS-SUC IM: CPT | Performed by: FAMILY MEDICINE

## 2024-03-14 PROCEDURE — 99207 E-CONSULT TO NEPHROLOGY (ADULT OUTPT PROVIDER TO SPECIALIST WRITTEN QUESTION & RESPONSE): CPT | Performed by: FAMILY MEDICINE

## 2024-03-14 PROCEDURE — 80053 COMPREHEN METABOLIC PANEL: CPT | Performed by: PATHOLOGY

## 2024-03-14 PROCEDURE — 85025 COMPLETE CBC W/AUTO DIFF WBC: CPT | Performed by: PATHOLOGY

## 2024-03-14 PROCEDURE — 99451 NTRPROF PH1/NTRNET/EHR 5/>: CPT | Performed by: STUDENT IN AN ORGANIZED HEALTH CARE EDUCATION/TRAINING PROGRAM

## 2024-03-14 PROCEDURE — 99417 PROLNG OP E/M EACH 15 MIN: CPT | Performed by: FAMILY MEDICINE

## 2024-03-14 PROCEDURE — 83835 ASSAY OF METANEPHRINES: CPT | Mod: 90 | Performed by: PATHOLOGY

## 2024-03-14 RX ORDER — CLONIDINE HYDROCHLORIDE 0.1 MG/1
0.1 TABLET ORAL ONCE
Status: COMPLETED | OUTPATIENT
Start: 2024-03-14 | End: 2024-03-14

## 2024-03-14 RX ORDER — RESPIRATORY SYNCYTIAL VIRUS VACCINE 120MCG/0.5
0.5 KIT INTRAMUSCULAR ONCE
Qty: 1 EACH | Refills: 0 | Status: CANCELLED | OUTPATIENT
Start: 2024-03-14 | End: 2024-03-14

## 2024-03-14 RX ORDER — SPIRONOLACTONE 25 MG/1
25 TABLET ORAL DAILY
Qty: 30 TABLET | Refills: 1 | Status: SHIPPED | OUTPATIENT
Start: 2024-03-14 | End: 2024-03-19

## 2024-03-14 RX ORDER — CLONIDINE HYDROCHLORIDE 0.1 MG/1
0.1 TABLET ORAL 2 TIMES DAILY
Qty: 60 TABLET | Refills: 1 | Status: SHIPPED | OUTPATIENT
Start: 2024-03-14 | End: 2024-04-04

## 2024-03-14 RX ORDER — AMLODIPINE BESYLATE 5 MG/1
5 TABLET ORAL DAILY
Qty: 30 TABLET | Refills: 1 | Status: SHIPPED | OUTPATIENT
Start: 2024-03-14 | End: 2024-03-14

## 2024-03-14 RX ADMIN — CLONIDINE HYDROCHLORIDE 0.1 MG: 0.1 TABLET ORAL at 17:02

## 2024-03-14 NOTE — PROGRESS NOTES
Assessment & Plan       Essential hypertension  Previously well controlled w/ just ace    Hypertensive urgency  Very long talk about eval/treat options. [facility] gore. In visit e kandis, back, discussed.   Recheck sbp over 200; has HA. Given 0.1 mg clonidine here, over an hour sbp dropped by about half. HA gone, feels fine.   Labs today, US soon  Start clonidine and aldactone; stop ace and nvasc (se with each)  She has family that sees me, I offer usual MD follow-up she asks to see me at least in near term, set up next week, rvwd med se to watch for  We visited repeatedly after each step of assessment  - Adult E-Consult to Nephrology (Outpt Provider to Specialist Written Question & Response)  - Aldosterone; Future  - Renin activity; Future  - Aldosterone Renin Ratio; Future  - Metanephrines Plasma Free; Future  - CBC with platelets and differential; Future  - Comprehensive metabolic panel (BMP + Alb, Alk Phos, ALT, AST, Total. Bili, TP); Future  - US Renal Complete w Arterial Duplex; Future  - spironolactone (ALDACTONE) 25 MG tablet; Take 1 tablet (25 mg) by mouth daily  - UA Macroscopic with reflex to Microscopic and Culture - Clinic Collect  - cloNIDine (CATAPRES) tablet 0.1 mg  - Aldosterone Renin Ratio  - Metanephrines Plasma Free  - CBC with platelets and differential  - Comprehensive metabolic panel (BMP + Alb, Alk Phos, ALT, AST, Total. Bili, TP)  - cloNIDine (CATAPRES) 0.1 MG tablet; Take 1 tablet (0.1 mg) by mouth 2 times daily  - Adult Nephrology  Referral; Future    Review of external notes as documented elsewhere in note  80 minutes spent by me on the date of the encounter doing chart review, history and exam, documentation and further activities per the note    MED REC REQUIRED{  Post Medication Reconciliation Status:         No follow-ups on file.    Cj Calles is a 83 year old, presenting for the following health issues:  Hospital F/U (Hypertension.)      3/14/2024     3:25 PM    Additional Questions   Roomed by KTR   Accompanied by DILMA CABA ()     HPI   1-inpt follow-up; [facility] notes rvwd in detail; she at home notes post lisinopril dose feels lightheaded temporarily, and after norvasc feels nauseted. 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 home bp and we review the fluctuations    2-when high, HA    3-neg head ct, brain MR, and cardio work up at [facility] rvwd    H/o htn but never high like this that they can recall; always a bit lightheaded post chronic lisinopril but just lived with it, now worse    Hoping to go to [facility] next week for a few weeks but after very long talk about her health, they will cancel it    In visit do E consult renal and hear back and review that note    Never had secondary htn eval; discussed at length    Recent mild elevation creatinine, new    Past Medical History:   Diagnosis Date    Cataract     Hyperlipidemia LDL goal < 130     Hypertension     white coat; home blood pressure monitoring April 2016  average systolic blood pressure approximately 115.    Hypothyroidism     Low bone density 8/16/2017    FRAX tenure probability of fracture, major osteoporotic: 14.6%; hip fracture 4% (increased) Plan:  Alendronate 70 mg weekly; trial basis to assess for reflux esophagitis    Ocular hypertension     Primary open-angle glaucoma     adv     Past Surgical History:   Procedure Laterality Date    CHOLECYSTECTOMY      INJECT EPIDURAL LUMBAR N/A 7/6/2023    Procedure: INJECTION, SPINE, LUMBAR, EPIDURAL L5/S1;  Surgeon: Rusty Skelton MD;  Location: [facility] OR    LAPAROSCOPIC APPENDECTOMY      LASER IRIDOTOMY OD (RIGHT EYE)      RE/LE  pre 2003    LASER IRIDOTOMY OS (LEFT EYE)  11/20/2010    SELECTIVE LASER TRABECULOPLASTY (SLT) OS (LEFT EYE)  1/22/2010    LE    TRABECULECTOMY, MITOMYCIN FILTER, COMBINED  1/10/2012    LE     Current Outpatient Medications    Medication    ASPIRIN 81 PO    atorvastatin (LIPITOR) 10 MG tablet    cloNIDine (CATAPRES) 0.1 MG tablet    cyclobenzaprine (FLEXERIL) 5 MG tablet    levothyroxine (SYNTHROID/LEVOTHROID) 50 MCG tablet    Multiple Vitamin (MULTIVITAMIN) per tablet    omeprazole (PRILOSEC) 20 MG DR capsule    spironolactone (ALDACTONE) 25 MG tablet    travoprost CARMELA FREE (TRAVATAN Z) 0.004 % ophthalmic solution     No current facility-administered medications for this visit.     No Known Allergies      Family History   Problem Relation Age of Onset    Cancer Mother 78        stomach and  at 80    Hypertension Mother     Glaucoma Mother     Breast Cancer Sister 38        still living    Hypertension Father     Cerebrovascular Disease Father 52    Glaucoma Maternal Grandfather      Social History     Socioeconomic History    Marital status:      Spouse name: Not on file    Number of children: Not on file    Years of education: Not on file    Highest education level: Not on file   Occupational History    Not on file   Tobacco Use    Smoking status: Never    Smokeless tobacco: Never   Substance and Sexual Activity    Alcohol use: Yes     Alcohol/week: 0.0 standard drinks of alcohol     Comment: rare tequilla    Drug use: No    Sexual activity: Yes     Partners: Male     Comment: 1962   Other Topics Concern    Not on file   Social History Narrative     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  Physical Exam   GENERAL: alert and no distress  NECK: no adenopathy, no asymmetry, masses, or scars  RESP: lungs clear to auscultation - no rales, rhonchi or wheezes  CV: regular rate and rhythm, normal S1 S2, no S3 or S4, no murmur, click or rub, no peripheral edema  ABDOMEN: soft, nontender, no hepatosplenomegaly, no masses and bowel sounds normal  MS: no gross musculoskeletal defects noted, no edema            Signed Electronically by: Khris Luciano MD

## 2024-03-14 NOTE — PROGRESS NOTES
3/14/2024     E-Consult has been accepted.    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 Since home, BP varies from normal to up to sbp 180 (usually if high 160 or so). Chronically lisinopril has made her a bit dizzy when takes it, and it still does. She feels a little nausea after each norvasc dose. Given the above circumstances, I'm checking cbc, cmet, metanephrine (blood random), renin/jennifer ration, and SANTOS US. After her blood is obtained today I will start her on a low dose of aldactone awaiting all results. Any further test, or suggested medicine regimen?    Patient assessment and information reviewed: This is an 82 yo W with what can best be described, per report, as labile BP and uncontrolled HTN (with recent HTN emergency). TSH was normal when checked during that hospitalization. Serum creatinine around 1.0 mg/dL.     Recommendations:   I agree with the use of lisinopril 40mg daily and amlodipine. Amlodipine dose could be increased to 10mg daily, if tolerated (or if needing an interim med while waiting to start spironolactone; see below).     It appears that the patient has been given a new spironolactone prescription for her HTN as well. I agree with the plan to have her given that blood today before she starts the spironolactone (because otherwise she would need ~6 week washout period before that testing). If she cannot give blood today, as above, amlodipine could be increased to 10mg daily while waiting to begin the spironolactone prescription. I would recommend a BMP check 1-2 weeks after starting spironolactone (in addition to the planned labs for today).    I also agree with the other secondary HTN workup planned. I would add a urinalysis  given that nephritic conditions (meaning: those causing hematuria alongside proteinuria) can cause HTN, though I am less suspicious of this in an 83 year old.     Other secondary contributors to HTN (but not often the sole cause) that can be screened for by history or exam include, but are not limited to, sleep apnea, obesity (not applicable in this case with BMI of 24), alcohol abuse, recreational drug abuse, and frequent NSAID use.    Regarding her labile BP, she may not be able to tolerate a usual goal BP of <130/80. Her BP goal may need to be personalized to what she can tolerate, and I defer to the ordering provider (unless the patient ends up in nephrology clinic or other HTN-focused specialty clinic) to discuss balancing risks and benefits of acute issues from BP lowering compared to long-term benefits from BP lowering. I do note a very wide pulse pressure for this patient, which is not unusual at her age and can necessitate a higher goal SBP (such as <150 or, if necessary, <160).    Regarding titration of BP meds, generally, unless BP is so high to require urgent intervention, I would otherwise recommend no changes to BP meds more often than once every 4 weeks to increase the tolerability to a progressive lowering of BP.     If the patient continues to have symptomatic low BPs alongside uncontrolled HTN (above goal set by ordering provider after discussion with patient, as detailed above), or if screening for secondary causes of HTN reveal a cause that should be addressed in nephrology (or if her UA is concerning upon screening and repeat testing if positive), please then refer this patient to nephrology clinic.       The recommendations provided in this E-Consult are based on a review of clinical data pertinent to the clinical question presented, without a review of the patient's complete medical record or, the benefit of a comprehensive in-person or virtual patient evaluation. This consultation should not  replace the clinical judgement and evaluation of the provider ordering this E-Consult. Any new clinical issues, or changes in patient status since the filing of this E-Consult will need to be taken into account when assessing these recommendations. Please contact me if you have further questions.    My total time spent reviewing clinical information and formulating assessment was 15 minutes.        Mac Becker MD  Nephrology

## 2024-03-14 NOTE — PROGRESS NOTES
Marli Ponce is a 83 year old female that presents in clinic today for a visit with Dr. Luciano. The provider evaluated the patient and ordered 0.1 mg of Clonidine HCL to be administered orally. The patient's allergies and medications were reviewed. The dosage and medication name/type was verbally acknowledged by the patient and the patient consented to receive the clinic administered medication. The patient self-administered the oral medication but was observed, overseen, and facilitated by a member of the rooming staff. The medication administration occurred without incident--see MAR list for administration details.The patient was monitored for 15 minutes afterward, in case of an adverse reaction.         KYLE Rodrigez at 5:04 PM on 3/14/2024

## 2024-03-15 ENCOUNTER — TELEPHONE (OUTPATIENT)
Dept: INTERNAL MEDICINE | Facility: CLINIC | Age: 84
End: 2024-03-15
Payer: MEDICARE

## 2024-03-15 LAB — ALDOST SERPL-MCNC: 11.5 NG/DL (ref 0–31)

## 2024-03-15 NOTE — TELEPHONE ENCOUNTER
RN called the patient back to relay Dr. Luciano's recommendation to hold spironolactone if systolic blood pressure<100 OR if the patient is feeling lightheaded. The patient stated she will be checking her blood pressure again later today and will plan to check it 3-4 times today. The patient verbalized understanding of the plan and denied having questions.

## 2024-03-15 NOTE — TELEPHONE ENCOUNTER
"RN called and spoke to the patient at the request of Dr. Luciano.     The patient stated that overall she is feeling well. The patient stated that her blood pressure was lower today when she last measured her BP around 1PM at 90/52. Earlier today the patient felt somewhat dizzy, but the patient thinks this was likely related to having not eaten as the dizziness resolved and she feels \"much better\" after she ate. The patient states that the pills make her feel \"a little sleepy\" but she denies other symptoms at this time.   "

## 2024-03-17 DIAGNOSIS — I16.0 HYPERTENSIVE URGENCY: ICD-10-CM

## 2024-03-18 LAB
ALDOST/RENIN PLAS-RTO: 38.3 {RATIO} (ref 0–25)
ANNOTATION COMMENT IMP: NORMAL
METANEPHS SERPL-SCNC: 0.24 NMOL/L
NORMETANEPHRINE SERPL-SCNC: 0.84 NMOL/L
RENIN PLAS-CCNC: 0.3 NG/ML/HR

## 2024-03-19 ENCOUNTER — OFFICE VISIT (OUTPATIENT)
Dept: FAMILY MEDICINE | Facility: CLINIC | Age: 84
End: 2024-03-19
Payer: MEDICARE

## 2024-03-19 ENCOUNTER — MYC MEDICAL ADVICE (OUTPATIENT)
Dept: NEPHROLOGY | Facility: CLINIC | Age: 84
End: 2024-03-19

## 2024-03-19 ENCOUNTER — LAB (OUTPATIENT)
Dept: LAB | Facility: CLINIC | Age: 84
End: 2024-03-19
Payer: MEDICARE

## 2024-03-19 VITALS
HEART RATE: 51 BPM | HEIGHT: 60 IN | BODY MASS INDEX: 24.22 KG/M2 | SYSTOLIC BLOOD PRESSURE: 133 MMHG | DIASTOLIC BLOOD PRESSURE: 72 MMHG | OXYGEN SATURATION: 98 %

## 2024-03-19 DIAGNOSIS — R74.8 ELEVATED CREATINE KINASE: ICD-10-CM

## 2024-03-19 DIAGNOSIS — I16.0 HYPERTENSIVE URGENCY: ICD-10-CM

## 2024-03-19 DIAGNOSIS — Z23 NEED FOR TDAP VACCINATION: ICD-10-CM

## 2024-03-19 DIAGNOSIS — I16.0 HYPERTENSIVE URGENCY: Primary | ICD-10-CM

## 2024-03-19 DIAGNOSIS — E87.1 HYPONATREMIA: ICD-10-CM

## 2024-03-19 LAB
ANION GAP SERPL CALCULATED.3IONS-SCNC: 8 MMOL/L (ref 7–15)
BUN SERPL-MCNC: 13.3 MG/DL (ref 8–23)
CALCIUM SERPL-MCNC: 10 MG/DL (ref 8.8–10.2)
CHLORIDE SERPL-SCNC: 98 MMOL/L (ref 98–107)
CREAT SERPL-MCNC: 1.01 MG/DL (ref 0.51–0.95)
DEPRECATED HCO3 PLAS-SCNC: 30 MMOL/L (ref 22–29)
EGFRCR SERPLBLD CKD-EPI 2021: 54 ML/MIN/1.73M2
GLUCOSE SERPL-MCNC: 109 MG/DL (ref 70–99)
POTASSIUM SERPL-SCNC: 5.1 MMOL/L (ref 3.4–5.3)
SODIUM SERPL-SCNC: 136 MMOL/L (ref 135–145)

## 2024-03-19 PROCEDURE — 36415 COLL VENOUS BLD VENIPUNCTURE: CPT | Performed by: PATHOLOGY

## 2024-03-19 PROCEDURE — 80048 BASIC METABOLIC PNL TOTAL CA: CPT | Performed by: PATHOLOGY

## 2024-03-19 PROCEDURE — 99214 OFFICE O/P EST MOD 30 MIN: CPT | Performed by: FAMILY MEDICINE

## 2024-03-19 RX ORDER — RESPIRATORY SYNCYTIAL VIRUS VACCINE 120MCG/0.5
0.5 KIT INTRAMUSCULAR ONCE
Qty: 1 EACH | Refills: 0 | Status: CANCELLED | OUTPATIENT
Start: 2024-03-19 | End: 2024-03-19

## 2024-03-19 ASSESSMENT — PAIN SCALES - GENERAL: PAINLEVEL: NO PAIN (0)

## 2024-03-19 NOTE — TELEPHONE ENCOUNTER
Sent patient a My Chart message to schedule a New HTN appointment for Essential hypertension with labs prior per in basket message. // 03.19.2024 MCE

## 2024-03-19 NOTE — PROGRESS NOTES
Assessment & Plan     Need for Tdap vaccination  Suggested at drugstore    Hypertensive urgency  See HPI  - Basic metabolic panel  (Ca, Cl, CO2, Creat, Gluc, K, Na, BUN); Future    Hyponatremia  Same    Elevated creatine kinase  Same      32 minutes spent by me on the date of the encounter doing chart review, history and exam, documentation and further activities per the note        Answered questions of her,  Dr Ponce. Cont clonidine only, see me early April, US in meantime.   No follow-ups on file.    Cj Calles is a 83 year old, presenting for the following health issues:  Follow Up (Hypertension. Pt is wondering if they should take their Clonidine this morning. Pt also states chest pressure, not pain/)      3/19/2024     8:37 AM   Additional Questions   Roomed by ROSIEO, EMT   Accompanied by Dr. Ponce,      History of Present Illness       Hypertension: She presents for follow up of hypertension.  She does check blood pressure  regularly outside of the clinic. Outside blood pressures have been over 140/90. She follows a low salt diet.     She eats 2-3 servings of fruits and vegetables daily.She consumes 1 sweetened beverage(s) daily.She exercises with enough effort to increase her heart rate 30 to 60 minutes per day.  She exercises with enough effort to increase her heart rate 7 days per week.   She is taking medications regularly.   Here w/   1-htn much better.  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 Normal to modest elevations, nothing around or over 200 like previously  Normal pheo lab. Borderline jennifer lab. Has not done US, rvwd why to do    Aldactone made her dizzy and HA, tried two pills each time these sx, so will not use    Using 1-2 a day of the clonidine 0.1 mg tabs. Tolerated. Rvwd due to risk of fatigue and rebound often not used, but since on tolerating and helping will leave on for now    Low sodium; unclera why, better today; at baseline creat last few years    No  new c/o    Past Medical History:   Diagnosis Date    Cataract     Hyperlipidemia LDL goal < 130     Hypertension     white coat; home blood pressure monitoring April 2016  average systolic blood pressure approximately 115.    Hypothyroidism     Low bone density 8/16/2017    FRAX tenure probability of fracture, major osteoporotic: 14.6%; hip fracture 4% (increased) Plan:  Alendronate 70 mg weekly; trial basis to assess for reflux esophagitis    Ocular hypertension     Primary open-angle glaucoma     adv     Past Surgical History:   Procedure Laterality Date    CHOLECYSTECTOMY      INJECT EPIDURAL LUMBAR N/A 7/6/2023    Procedure: INJECTION, SPINE, LUMBAR, EPIDURAL L5/S1;  Surgeon: Rusty Skelton MD;  Location: [facility] OR    LAPAROSCOPIC APPENDECTOMY      LASER IRIDOTOMY OD (RIGHT EYE)      RE/LE  pre 2003    LASER IRIDOTOMY OS (LEFT EYE)  11/20/2010    SELECTIVE LASER TRABECULOPLASTY (SLT) OS (LEFT EYE)  1/22/2010    LE    TRABECULECTOMY, MITOMYCIN FILTER, COMBINED  1/10/2012    LE     Current Outpatient Medications   Medication    ASPIRIN 81 PO    cloNIDine (CATAPRES) 0.1 MG tablet    levothyroxine (SYNTHROID/LEVOTHROID) 50 MCG tablet    Multiple Vitamin (MULTIVITAMIN) per tablet    omeprazole (PRILOSEC) 20 MG DR capsule    spironolactone (ALDACTONE) 25 MG tablet    travoprost CARMELA FREE (TRAVATAN Z) 0.004 % ophthalmic solution    atorvastatin (LIPITOR) 10 MG tablet    cyclobenzaprine (FLEXERIL) 5 MG tablet     No current facility-administered medications for this visit.     No Known Allergies            Objective    /72 (BP Location: Right arm, Patient Position: Sitting, Cuff Size: Adult Small)   Pulse 51   Ht 1.524 m (5')   SpO2 98%   BMI 24.22 kg/m    Body mass index is 24.22 kg/m .  Physical Exam   GENERAL: alert and no distress  CV: regular rate and rhythm, normal S1 S2, no S3 or S4, no murmur, click or rub, no peripheral edema  MS: no gross musculoskeletal defects noted, no edema            Signed  Electronically by: Khris Luciano MD

## 2024-03-21 ENCOUNTER — TELEPHONE (OUTPATIENT)
Dept: FAMILY MEDICINE | Facility: CLINIC | Age: 84
End: 2024-03-21
Payer: MEDICARE

## 2024-03-21 RX ORDER — AMLODIPINE BESYLATE 5 MG/1
5 TABLET ORAL DAILY
Qty: 90 TABLET | Refills: 3 | Status: SHIPPED | OUTPATIENT
Start: 2024-03-21 | End: 2024-04-04

## 2024-03-21 NOTE — TELEPHONE ENCOUNTER
Attempted to reach Jr via cell number (C2C on file), but number is not in service. Attempted to reach out to home number. Call went straight to voicemail. Writer left message asking for a call back.  Emely CASTRO LPN  [facility]

## 2024-03-21 NOTE — TELEPHONE ENCOUNTER
amLODIPine (NORVASC) 5 MG tablet (Discontinued)   30 tablet 1 3/14/2024 3/14/2024     Take 1 tablet (5 mg) by mouth daily - Oral     Calcium Channel Blockers Protocol  [id number]/17/2024 08:27 AM   Protocol Details Medication is active on med list

## 2024-03-21 NOTE — TELEPHONE ENCOUNTER
Spoke with patient to discuss. She states that she has been taking the clonidine twice daily but her blood pressure is still 200/100. She is also getting dizziness and headaches with the medication. Will send this information to care team to follow up on recommendations.  Emely CASTRO LPN  [facility]

## 2024-03-21 NOTE — TELEPHONE ENCOUNTER
[facility]    Phone Message    May a detailed message be left on voicemail: yes     Reason for Call: Medication Question or concern regarding medication   Prescription Clarification  Name of Medication: cloNIDine (CATAPRES) 0.1 MG tablet   Prescribing Provider: Mustapha   What on the order needs clarification? Patients  stated he has some questions about this medication, please call back.      Action Taken: Message routed to:  [facility] ([facility] P[facility]    Travel Screening: Not Applicable

## 2024-03-21 NOTE — TELEPHONE ENCOUNTER
Left a detailed message to the pt to resume amlodipine 5 mg/day along with clonidine BID. If still having severe dizziness or headache, she should go to ED.

## 2024-03-22 ENCOUNTER — ANCILLARY PROCEDURE (OUTPATIENT)
Dept: ULTRASOUND IMAGING | Facility: CLINIC | Age: 84
End: 2024-03-22
Attending: FAMILY MEDICINE
Payer: MEDICARE

## 2024-03-22 DIAGNOSIS — I16.0 HYPERTENSIVE URGENCY: ICD-10-CM

## 2024-03-22 PROCEDURE — 76770 US EXAM ABDO BACK WALL COMP: CPT | Mod: GC | Performed by: RADIOLOGY

## 2024-03-22 PROCEDURE — 93975 VASCULAR STUDY: CPT | Mod: GC | Performed by: RADIOLOGY

## 2024-04-04 ENCOUNTER — OFFICE VISIT (OUTPATIENT)
Dept: FAMILY MEDICINE | Facility: CLINIC | Age: 84
End: 2024-04-04
Payer: MEDICARE

## 2024-04-04 ENCOUNTER — LAB (OUTPATIENT)
Dept: LAB | Facility: CLINIC | Age: 84
End: 2024-04-04
Payer: MEDICARE

## 2024-04-04 VITALS
WEIGHT: 121 LBS | BODY MASS INDEX: 23.63 KG/M2 | OXYGEN SATURATION: 97 % | DIASTOLIC BLOOD PRESSURE: 85 MMHG | HEART RATE: 51 BPM | SYSTOLIC BLOOD PRESSURE: 144 MMHG

## 2024-04-04 DIAGNOSIS — R09.89 LABILE HYPERTENSION: ICD-10-CM

## 2024-04-04 DIAGNOSIS — R55 NEAR SYNCOPE: ICD-10-CM

## 2024-04-04 DIAGNOSIS — R51.9 NONINTRACTABLE HEADACHE, UNSPECIFIED CHRONICITY PATTERN, UNSPECIFIED HEADACHE TYPE: Primary | ICD-10-CM

## 2024-04-04 DIAGNOSIS — I10 ESSENTIAL HYPERTENSION, BENIGN: Primary | ICD-10-CM

## 2024-04-04 DIAGNOSIS — E78.00 HIGH CHOLESTEROL: ICD-10-CM

## 2024-04-04 DIAGNOSIS — R51.9 NONINTRACTABLE HEADACHE, UNSPECIFIED CHRONICITY PATTERN, UNSPECIFIED HEADACHE TYPE: ICD-10-CM

## 2024-04-04 DIAGNOSIS — I10 ESSENTIAL HYPERTENSION, BENIGN: ICD-10-CM

## 2024-04-04 LAB
ALBUMIN MFR UR ELPH: 9.9 MG/DL
ALBUMIN SERPL BCG-MCNC: 4.3 G/DL (ref 3.5–5.2)
ALBUMIN UR-MCNC: NEGATIVE MG/DL
ANION GAP SERPL CALCULATED.3IONS-SCNC: 11 MMOL/L (ref 7–15)
APPEARANCE UR: CLEAR
BILIRUB UR QL STRIP: NEGATIVE
BUN SERPL-MCNC: 17.8 MG/DL (ref 8–23)
CALCIUM SERPL-MCNC: 10 MG/DL (ref 8.8–10.2)
CHLORIDE SERPL-SCNC: 99 MMOL/L (ref 98–107)
CHOLEST SERPL-MCNC: 257 MG/DL
COLOR UR AUTO: ABNORMAL
CREAT SERPL-MCNC: 1.1 MG/DL (ref 0.51–0.95)
CREAT UR-MCNC: 140 MG/DL
CREAT UR-MCNC: 140 MG/DL
CRP SERPL-MCNC: <3 MG/L
DEPRECATED HCO3 PLAS-SCNC: 26 MMOL/L (ref 22–29)
EGFRCR SERPLBLD CKD-EPI 2021: 49 ML/MIN/1.73M2
ERYTHROCYTE [DISTWIDTH] IN BLOOD BY AUTOMATED COUNT: 12.1 % (ref 10–15)
GLUCOSE SERPL-MCNC: 98 MG/DL (ref 70–99)
GLUCOSE UR STRIP-MCNC: NEGATIVE MG/DL
HCT VFR BLD AUTO: 40 % (ref 35–47)
HDLC SERPL-MCNC: 97 MG/DL
HGB BLD-MCNC: 13.8 G/DL (ref 11.7–15.7)
HGB UR QL STRIP: NEGATIVE
HYALINE CASTS: 3 /LPF
KETONES UR STRIP-MCNC: NEGATIVE MG/DL
LDLC SERPL CALC-MCNC: 144 MG/DL
LEUKOCYTE ESTERASE UR QL STRIP: NEGATIVE
MCH RBC QN AUTO: 30.6 PG (ref 26.5–33)
MCHC RBC AUTO-ENTMCNC: 34.5 G/DL (ref 31.5–36.5)
MCV RBC AUTO: 89 FL (ref 78–100)
MICROALBUMIN UR-MCNC: <12 MG/L
MICROALBUMIN/CREAT UR: NORMAL MG/G{CREAT}
MUCOUS THREADS #/AREA URNS LPF: PRESENT /LPF
NITRATE UR QL: NEGATIVE
NONHDLC SERPL-MCNC: 160 MG/DL
PH UR STRIP: 6.5 [PH] (ref 5–7)
PHOSPHATE SERPL-MCNC: 4.7 MG/DL (ref 2.5–4.5)
PLATELET # BLD AUTO: 208 10E3/UL (ref 150–450)
POTASSIUM SERPL-SCNC: 5.1 MMOL/L (ref 3.4–5.3)
PROT/CREAT 24H UR: 0.07 MG/MG CR (ref 0–0.2)
RBC # BLD AUTO: 4.51 10E6/UL (ref 3.8–5.2)
RBC URINE: <1 /HPF
SODIUM SERPL-SCNC: 136 MMOL/L (ref 135–145)
SP GR UR STRIP: 1.02 (ref 1–1.03)
SQUAMOUS EPITHELIAL: <1 /HPF
TRIGL SERPL-MCNC: 81 MG/DL
UROBILINOGEN UR STRIP-MCNC: NORMAL MG/DL
WBC # BLD AUTO: 3.3 10E3/UL (ref 4–11)
WBC URINE: <1 /HPF

## 2024-04-04 PROCEDURE — 85027 COMPLETE CBC AUTOMATED: CPT | Performed by: PATHOLOGY

## 2024-04-04 PROCEDURE — 81001 URINALYSIS AUTO W/SCOPE: CPT | Performed by: PATHOLOGY

## 2024-04-04 PROCEDURE — 36415 COLL VENOUS BLD VENIPUNCTURE: CPT | Performed by: PATHOLOGY

## 2024-04-04 PROCEDURE — 82570 ASSAY OF URINE CREATININE: CPT | Performed by: INTERNAL MEDICINE

## 2024-04-04 PROCEDURE — 84156 ASSAY OF PROTEIN URINE: CPT | Performed by: PATHOLOGY

## 2024-04-04 PROCEDURE — 99215 OFFICE O/P EST HI 40 MIN: CPT | Performed by: FAMILY MEDICINE

## 2024-04-04 PROCEDURE — 99000 SPECIMEN HANDLING OFFICE-LAB: CPT | Performed by: PATHOLOGY

## 2024-04-04 PROCEDURE — 80069 RENAL FUNCTION PANEL: CPT | Performed by: PATHOLOGY

## 2024-04-04 PROCEDURE — 86140 C-REACTIVE PROTEIN: CPT | Performed by: PATHOLOGY

## 2024-04-04 PROCEDURE — 80061 LIPID PANEL: CPT | Performed by: PATHOLOGY

## 2024-04-04 RX ORDER — RESPIRATORY SYNCYTIAL VIRUS VACCINE 120MCG/0.5
0.5 KIT INTRAMUSCULAR ONCE
Qty: 1 EACH | Refills: 0 | Status: CANCELLED | OUTPATIENT
Start: 2024-04-04 | End: 2024-04-04

## 2024-04-04 RX ORDER — HYDRALAZINE HYDROCHLORIDE 10 MG/1
TABLET, FILM COATED ORAL
Qty: 40 TABLET | Refills: 1 | Status: SHIPPED | OUTPATIENT
Start: 2024-04-04 | End: 2024-05-06

## 2024-04-04 RX ORDER — LISINOPRIL 20 MG/1
20 TABLET ORAL 2 TIMES DAILY
COMMUNITY
End: 2024-04-17

## 2024-04-04 NOTE — PROGRESS NOTES
Assessment & Plan     Nonintractable headache, unspecified chronicity pattern, unspecified headache type  Comes and goes but checked inflam marker, wnl-usually in concert w/ dizziness so likely linked  - CRP, inflammation; Future    Labile hypertension  Stop current meds for bp as she thinks they all contribute to near syncope and headache; although it is possible that rather than they all do, other causes for these symptoms exist that I have not yet identfied. Before sees Renal next week, just use prn hydralazine, she recalls tolerating in [facility] ER  - hydrALAZINE (APRESOLINE) 10 MG tablet; Check blood pressure three times a day. Take one tab po tid prn systolic blood pressure over 160; hold if below that.  - Lipid panel reflex to direct LDL Fasting; Future    High cholesterol  Discuss statin next visit  - Lipid panel reflex to direct LDL Fasting; Future    Near syncope  Start in case rhythm disturbance causing this; mri/a head neck, heart eval otherwise, nor labs, finding cause so far  - ZIO PATCH 8-14 DAYS (additional cost to patient)  - ZIO PATCH 8-14 DAYS APPLICATION    Review of external notes as documented elsewhere [facility]  44 minutes spent by me on the date of the encounter doing chart review, history and exam, documentation and further activities per the note          No follow-ups on file.    Cj Calles is a 83 year old, presenting for the following health issues:  Follow Up      4/4/2024     1:49 PM   Additional Questions   Roomed by KTR   Accompanied by Jr Ward()     HPI Feels worse since last saw me. Switched meds; now just on lisinopril 20 mg bid. Sees renal next week; did their labs today, creat stable.    Nocturia: not on diuretics, not diabetic, no UTI on UA today. No edema. Would need Uro eval to work up further, she defers for now.    Lightheaded at times; not at rest, only if on feet. Can get HA with this.     [facility]  in detail w/ her    Per pt many side effects w/ clonidine, norvasc, aldactone; simliar but less w/ ace-recalls years ago tolerated ace.    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    Hypothyroidism     Low bone density 8/16/2017    FRAX tenure probability of fracture, major osteoporotic: 14.6%; hip fracture 4% (increased) Plan:  Alendronate 70 mg weekly; trial basis to assess for reflux esophagitis    Ocular hypertension     Primary open-angle glaucoma     adv     Past Surgical History:   Procedure Laterality Date    CHOLECYSTECTOMY      INJECT EPIDURAL LUMBAR N/A 7/6/2023    Procedure: INJECTION, SPINE, LUMBAR, EPIDURAL L5/S1;  Surgeon: Rusty Skelton MD;  Location: [facility] OR    LAPAROSCOPIC APPENDECTOMY      LASER IRIDOTOMY OD (RIGHT EYE)      RE/LE  pre 2003    LASER IRIDOTOMY OS (LEFT EYE)  11/20/2010    SELECTIVE LASER TRABECULOPLASTY (SLT) OS (LEFT EYE)  1/22/2010    LE    TRABECULECTOMY, MITOMYCIN FILTER, COMBINED  1/10/2012    LE     Current Outpatient Medications   Medication Sig Dispense Refill    ASPIRIN 81 PO Take 81 mg by mouth daily      hydrALAZINE (APRESOLINE) 10 MG tablet Check blood pressure three times a day. Take one tab po tid prn systolic blood pressure over 160; hold if below that. 40 tablet 1    levothyroxine (SYNTHROID/LEVOTHROID) 50 MCG tablet Take 1 tablet (50 mcg) by mouth daily 90 tablet 3    lisinopril (ZESTRIL) 20 MG tablet Take 20 mg by mouth 2 times daily      Multiple Vitamin (MULTIVITAMIN) per tablet Take 1 tablet by mouth daily.      omeprazole (PRILOSEC) 20 MG DR capsule Take 1 capsule (20 mg) by mouth daily 90 capsule 2    travoprost CARMELA FREE (TRAVATAN Z) 0.004 %  ophthalmic solution Place 1 drop into the right eye At Bedtime Call clinic to schedule follow up appointment. For additional refills, please schedule a follow-up appointment at [phone].  Past due for appt. 2.5 mL 1    atorvastatin (LIPITOR) 10 MG tablet Take 1 tablet (10 mg) by mouth daily (Patient not taking: Reported on 3/19/2024) 90 tablet 3    cyclobenzaprine (FLEXERIL) 5 MG tablet Take 1 tablet (5 mg) by mouth 3 times daily as needed for muscle spasms (Patient not taking: Reported on 3/19/2024) 42 tablet 1     No current facility-administered medications for this visit.     No Known Allergies  Family History   Problem Relation Age of Onset    Cancer Mother 78        stomach and  at 80    Hypertension Mother     Glaucoma Mother     Breast Cancer Sister 38        still living    Hypertension Father     Cerebrovascular Disease Father 52    Glaucoma Maternal Grandfather      Social History     Socioeconomic History    Marital status:      Spouse name: Not on file    Number of children: Not on file    Years of education: Not on file    Highest education level: Not on file   Occupational History    Not on file   Tobacco Use    Smoking status: Never    Smokeless tobacco: Never   Substance and Sexual Activity    Alcohol use: Yes     Alcohol/week: 0.0 standard drinks of alcohol     Comment: rare tequilla    Drug use: No    Sexual activity: Yes     Partners: Male     Comment: 1962   Other Topics Concern    Not on file   Social History Narrative     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  Physical Exam   GENERAL: alert and no distress  MS: no gross musculoskeletal defects noted, no edema            Signed Electronically by: Khris Luciano MD

## 2024-04-10 ENCOUNTER — OFFICE VISIT (OUTPATIENT)
Dept: NEPHROLOGY | Facility: CLINIC | Age: 84
End: 2024-04-10
Attending: INTERNAL MEDICINE
Payer: MEDICARE

## 2024-04-10 ENCOUNTER — PRE VISIT (OUTPATIENT)
Dept: NEPHROLOGY | Facility: CLINIC | Age: 84
End: 2024-04-10
Payer: MEDICARE

## 2024-04-10 VITALS
BODY MASS INDEX: 23.55 KG/M2 | WEIGHT: 120.6 LBS | SYSTOLIC BLOOD PRESSURE: 125 MMHG | TEMPERATURE: 98 F | DIASTOLIC BLOOD PRESSURE: 73 MMHG | OXYGEN SATURATION: 98 % | HEART RATE: 54 BPM

## 2024-04-10 DIAGNOSIS — I10 ESSENTIAL HYPERTENSION, BENIGN: Primary | ICD-10-CM

## 2024-04-10 PROCEDURE — G0463 HOSPITAL OUTPT CLINIC VISIT: HCPCS | Performed by: INTERNAL MEDICINE

## 2024-04-10 PROCEDURE — 99205 OFFICE O/P NEW HI 60 MIN: CPT | Performed by: INTERNAL MEDICINE

## 2024-04-10 RX ORDER — AMLODIPINE BESYLATE 5 MG/1
5 TABLET ORAL DAILY
Qty: 90 TABLET | Refills: 1 | Status: SHIPPED | OUTPATIENT
Start: 2024-04-10 | End: 2024-05-06

## 2024-04-10 ASSESSMENT — PAIN SCALES - GENERAL: PAINLEVEL: NO PAIN (0)

## 2024-04-10 NOTE — PATIENT INSTRUCTIONS
It was a pleasure taking care of you today.  I've included a brief summary of our discussion and care plan from today's visit below.  Please review this information with your primary care provider.  _______________________________________________________________________    My recommendations are summarized as follows:  -Keep the amount of sodium in your diet at 2.3 g/day (also see instructions attached in that regard)  -Keep a Blood Pressure diary by taking your blood pressure twice a day as instructed (also see instructions attached in that regard). Start in one week from now and take it for one week then send me the numbers via [employer]. Please make sure that you are using a validated blood pressure device by checking that it is the case at: https://www.validatebp.org/  -Avoid all NSAID's. Examples include Ibuprofen (Advil, Motrin), naprosyn (Aleve), diclofenac (Voltaren), celebrex among others. Acetaminophen (Tylenol) is ok with maximum dose in 24 hours of 3200 mg.  -Healthy lifestyle measures will keep your kidney's functioning at their current best. This includes regular exercise, weight loss and smoking cessation if you smoke.   -For tips on eating healthy:  -https://www.[facility].[employer].edu/nutritionsource/healthy-eating-pyramid/  -Do not exceed one alcoholic drink per day  -If for any reason, you are at risk of volume depletion/dehydration (high grade fevers, severe vomiting or diarrhea) stop lisinopril till you get better  -Please start amlodipine 5 mg once daily  -Please take lisinopril 20 mg twice daily in the morning and in the evening  -Please do labs in one month before your next appointment      To schedule imaging please call [phone]     To schedule your lab appointment at the [facility], please call       Return to Nephrology Clinic in one month to review your progress.    _______________________________________________________________________    Who do I call with any  questions after my visit?    Please be in touch if there are any further questions that arise following today's visit.  There are multiple ways to contact your nephrology care team.      During business hours, you may reach your Nephrology Care Coordinator, at .      To schedule or reschedule an appointment, please call [phone].    You can always send a secure message through [employer].  [employer] messages are answered by your nurse or doctor typically within 24 hours.  Please allow extra time on weekends and holidays.      For urgent/emergent questions after business hours, you may reach the on-call Nephrology Fellow by contacting the [facility]  at [phone].     How will I get the results of any tests ordered?    You will receive all of your results.  If you have signed up for [employer], any tests ordered at your visit will be available to you after your physician reviews them.  Typically this takes 1-2 weeks.  If there are urgent results that require a change in your care plan, your physician or nurse will call you to discuss the next steps.      What is [employer]?  [employer] is a secure way for you to access all of your healthcare records from the [facility].  It is a web based computer program, so you can sign on to it from any location.  It also allows you to send secure messages to your care team.  I recommend signing up for [employer] access if you have not already done so and are comfortable with using a computer.      How do I schedule a follow-up visit?  If you did not schedule a follow-up visit today, please call [phone] to schedule a follow-up office visit.        Sincerely,      Dr. Anabel Perez  [facility]  Division of Nephrology and Hypertension

## 2024-04-10 NOTE — LETTER
4/10/2024       RE: Marli Ponce  [address] [facility] [address]     Dear Colleague,    Thank you for referring your patient, Marli Ponce, to the [facility] NEPHROLOGY CLINIC [place] at [facility]. Please see a copy of my visit note below.    Nephrology Clinic    Marli Ponce MRN:[id number] YOB: 1940  Date of Service: 04/10/2024  Primary care provider: Khris Luciano  Requesting physician: Khris Luciano      REASON FOR CONSULT: Resistant hypertension    HISTORY OF PRESENT ILLNESS:   Marli Ponce is a 83 year old female who presents for evaluation of resistant hypertension.  The past medical history is significant for hypertension for more than 20 years but just noticed aq few months ago to be resistant and the patient reports that she first noted her blood pressure to be elevated when she took a cough medication in February. She subsequently presented to the ED on 2/21 with headache and a BP at 198/93. She was at that time on lisinopril 20 mg once daily only and it was increased to 40 mg once daily and amlodipine 5 mg was also added. However the patient didn't take and presented again on 3/6/24 to [facility] with hypertensive emergency. She was discharged on amlodipine 2.5 mg and lisinopril. She saw her PCP on 3/14 and a work up for secondary hypertension including renin, aldosterone levels, plasma free metanephrines and renal ultrasound with arterial duplex was sent and is essentially negative except for a possible right renal artery stenosis. Spironolactone was tried but the patient couldn't tolerate it. She is currently on lisinopril 20 mg twice daily only. Her BP is 125/73 in clinic but she reports that it is still uncontrolled at home especially in the morning. Her creatinine level is 1.1 on 4/4/24 close to her usual baseline. There is no evidence of any significant albuminuria.    The patient denies  any dysuria, any pollakiuria, any nocturia, any LE edema, any dyspnea on exertion .  The patient denies ever having kidney stones, urinary tract infections, gross hematuria. There is no family history of CKD.  The following portions of the patient's history were reviewed and updated as appropriate: allergies, current medications, past family history, past medical history, past social history, past surgical history and problem list.    ULTRASOUND RENAL COMPLETE WITH DOPPLER 3/22/2024 1:16 PM     CLINICAL HISTORY: Recent severe htn; Hypertensive urgency.       COMPARISONS: None available.     ORDERING PROVIDER: GENARO KRAUSE     TECHNIQUE: B-mode (grayscale) and duplex Doppler evaluation of the  abdominal aorta and renal arteries performed. Velocity measurements  obtained with angle correction at or less than 60 degrees.     Cine clips through the kidneys were saved in the patient's record.     Findings:     AORTA:       Suprarenal: 45/7 cm/s, arterial monophasic       Infrarenal, proximal: 92/0 cm/s, triphasic       Infrarenal, distal: 73/0 cm/s, triphasic     RIGHT KIDNEY:       Renal artery:            Origin: 123/18 cm/s, 124/23 cm/s            Proximal: 176/38 cm/s, 175/40 cm/s            Mid: 118/21 cm/s, 124/24 cm/s            HIlum: 85/16 cm/s          Renal artery - aortic ratio: 3.9          Renal vein: 18 cm/s          Length: 8.0 cm          Cortical thickness: 0.7 cm          Segmental artery resistive index (superior / mid / inferior):  0.75 / 0.80 / 0.79          Parenchyma: Normal. No stone, mass, or hydronephrosis.     LEFT KIDNEY:       Renal artery:            Origin: 98/18 cm/s            Proximal: 138/25 cm/s, 124/19 cm/s            Mid: 74/11 cm/s            Hilum: 67/11 cm/s          Renal artery - aortic ratio: 3.07          Renal vein: 12 cm/s          Length: 9.6 cm          Cortical thickness: 0.7 cm          Segmental artery resistive index (superior / mid / inferior):  0.75 / 0.77 /  0.77          Parenchyma: Normal. No stone, mass, or hydronephrosis.                                                                      IMPRESSION:   1. Right renal artery velocity is less than 180 cm/s, but the velocity  ratio exceeds 3.5 (3.9). Distal waveforms are not post stenotic in  appearance. If clinically indicated and patient's renal function can  tolerate contrast, further evaluation with CTA could be performed.     2. Left renal artery stenosis not demonstrated.     3. Subcentimeter cortical thicknesses and elevated segmental artery  resistive indices suggest medical renal disease.     4. Negative grayscale appearance of the kidneys.     Guidelines:  Diagnostic criteria suggestive of > 60% diameter stenosis  Renal artery:       Peak systolic velocity > 180 cm/s       RAR > 3.5       Turbulent flow     Arcuate artery Resistive Index Normal < 0.7     I have personally reviewed the examination and initial interpretation  and I agree with the findings.     ELODIA DICKINSON MD     PAST MEDICAL HISTORY:  Past Medical History:   Diagnosis Date    Cataract     Hyperlipidemia LDL goal < 130     Hypertension     white coat; home blood pressure monitoring April 2016  average systolic blood pressure approximately 115.    Hypothyroidism     Low bone density 8/16/2017    FRAX tenure probability of fracture, major osteoporotic: 14.6%; hip fracture 4% (increased) Plan:  Alendronate 70 mg weekly; trial basis to assess for reflux esophagitis    Ocular hypertension     Primary open-angle glaucoma     adv     PAST SURGICAL HISTORY:  Past Surgical History:   Procedure Laterality Date    CHOLECYSTECTOMY      INJECT EPIDURAL LUMBAR N/A 7/6/2023    Procedure: INJECTION, SPINE, LUMBAR, EPIDURAL L5/S1;  Surgeon: Rusty Skelton MD;  Location: [facility] OR    LAPAROSCOPIC APPENDECTOMY      LASER IRIDOTOMY OD (RIGHT EYE)      RE/LE  pre 2003    LASER IRIDOTOMY OS (LEFT EYE)  11/20/2010    SELECTIVE LASER TRABECULOPLASTY (SLT) OS (LEFT  EYE)  1/22/2010    LE    TRABECULECTOMY, MITOMYCIN FILTER, COMBINED  1/10/2012    LE     MEDICATIONS:  Prescription Medications as of 4/10/2024         Rx Number Disp Refills Start End Last Dispensed Date Next Fill Date Owning Pharmacy    ASPIRIN 81 PO  -- --  --       Sig: Take 81 mg by mouth daily    Class: Historical    Route: Oral    levothyroxine (SYNTHROID/LEVOTHROID) 50 MCG tablet  90 tablet 3 7/28/2023 --   [facility] [address] [place] [address], [place] - [address] AT [place] [address]    Sig: Take 1 tablet (50 mcg) by mouth daily    Class: E-Prescribe    Route: Oral    lisinopril (ZESTRIL) 20 MG tablet  -- --  --       Sig: Take 20 mg by mouth 2 times daily    Class: Historical    Route: Oral    Multiple Vitamin (MULTIVITAMIN) per tablet  -- --  --       Sig: Take 1 tablet by mouth daily.    Class: Historical    Route: Oral    omeprazole (PRILOSEC) 20 MG DR capsule  90 capsule 2 7/21/2023 --   [facility] [address] [place] [address], [place] - [address] AT [place] [address]    Sig: Take 1 capsule (20 mg) by mouth daily    Class: E-Prescribe    Route: Oral    travoprost CARMELA FREE (TRAVATAN Z) 0.004 % ophthalmic solution  2.5 mL 1 12/3/2020 --   [facility] [address] [place] [address], [place] [address] [place] [address]    Sig: Place 1 drop into the right eye At Bedtime Call clinic to schedule follow up appointment. For additional refills, please schedule a follow-up appointment at [phone].  Past due for appt.    Class: E-Prescribe    Route: Right Eye    atorvastatin (LIPITOR) 10 MG tablet  90 tablet 3 8/2/2023 --   [facility] [address] [place] [address], [place] [address] [place] [address]    Sig: Take 1 tablet (10 mg) by mouth daily    Class: E-Prescribe    Route: Oral    cyclobenzaprine (FLEXERIL) 5 MG tablet  42 tablet 1 6/20/2023 --   [facility] #[id number] - [place], [place] - [address] [facility] [address]    Sig: Take  1 tablet (5 mg) by mouth 3 times daily as needed for muscle spasms    Class: E-Prescribe    Route: Oral    hydrALAZINE (APRESOLINE) 10 MG tablet  40 tablet 1 2024 --   [id number] [address] AT [facility] [address]    Sig: Check blood pressure three times a day. Take one tab po tid prn systolic blood pressure over 160; hold if below that.    Class: E-Prescribe           ALLERGIES:    No Known Allergies  REVIEW OF SYSTEMS:    A comprehensive review of systems was performed and found to be negative except as described here or above.  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    5 children one , 4 sons, 7 grandchildren 4 greatgrands    G5                 Social Determinants of Health     Financial Resource Strain: Not on file   Food Insecurity: Not on file   Transportation Needs: Not on file   Physical Activity: Not on file   Stress: Not on file   Social Connections: Unknown (3/4/2024)    [facility] [facility] [employer] [facility]    Social Connections     Frequency of Communication with Friends and Family: Not on file   Interpersonal Safety: Not on file   Housing Stability: Not on file     FAMILY MEDICAL HISTORY:   Family History   Problem Relation Age of Onset    Cancer Mother 78        stomach and  at 80     Hypertension Mother     Glaucoma Mother     Breast Cancer Sister 38        still living    Hypertension Father     Cerebrovascular Disease Father 52    Glaucoma Maternal Grandfather      PHYSICAL EXAM:   /73   Pulse 54   Temp 98  F (36.7  C) (Oral)   Wt 54.7 kg (120 lb 9.6 oz)   SpO2 98%   BMI 23.55 kg/m    GENERAL APPEARANCE: alert and no distress  EYES: nonicteric  HENT: mouth without ulcers or lesions  NECK: supple, no adenopathy  RESP: lungs clear to auscultation   CV: regular rhythm, normal rate, no rub  ABDOMEN: soft, nontender, normal bowel sounds, no HSM   Extremities: no clubbing, cyanosis, or edema  MS: no evidence of inflammation in joints, no muscle tenderness  SKIN: no rash  NEURO: mentation intact and speech normal  PSYCH: affect normal/bright   LABS:   Recent Results (from the past 672 hour(s))   UA Macroscopic with reflex to Microscopic and Culture - Clinic Collect    Collection Time: 03/14/24  4:50 PM    Specimen: Urine, Midstream   Result Value Ref Range    Color Urine Straw Colorless, Straw, Light Yellow, Yellow    Appearance Urine Clear Clear    Glucose Urine Negative Negative mg/dL    Bilirubin Urine Negative Negative    Ketones Urine Negative Negative mg/dL    Specific Gravity Urine 1.004 1.003 - 1.035    Blood Urine Negative Negative    pH Urine 6.5 5.0 - 7.0    Protein Albumin Urine Negative Negative mg/dL    Urobilinogen Urine Normal Normal, 2.0 mg/dL    Nitrite Urine Negative Negative    Leukocyte Esterase Urine Negative Negative   Metanephrines Plasma Free    Collection Time: 03/14/24  5:13 PM   Result Value Ref Range    Metanephrine 0.24 0.00 - 0.49 nmol/L    Normetanephrine 0.84 0.00 - 0.89 nmol/L    Metanephrines Interpretation See Note    Comprehensive metabolic panel (BMP + Alb, Alk Phos, ALT, AST, Total. Bili, TP)    Collection Time: 03/14/24  5:13 PM   Result Value Ref Range    Sodium 130 (L) 135 - 145 mmol/L    Potassium 4.9 3.4 - 5.3 mmol/L    Carbon Dioxide (CO2) 26 22  - 29 mmol/L    Anion Gap 10 7 - 15 mmol/L    Urea Nitrogen 16.2 8.0 - 23.0 mg/dL    Creatinine 0.92 0.51 - 0.95 mg/dL    GFR Estimate 61 >60 mL/min/1.73m2    Calcium 9.9 8.8 - 10.2 mg/dL    Chloride 94 (L) 98 - 107 mmol/L    Glucose 96 70 - 99 mg/dL    Alkaline Phosphatase 81 40 - 150 U/L    AST 23 0 - 45 U/L    ALT 18 0 - 50 U/L    Protein Total 7.4 6.4 - 8.3 g/dL    Albumin 4.5 3.5 - 5.2 g/dL    Bilirubin Total 0.6 <=1.2 mg/dL   Aldosterone    Collection Time: 03/14/24  5:13 PM   Result Value Ref Range    Aldosterone 11.5 0.0 - 31.0 ng/dL   Renin activity    Collection Time: 03/14/24  5:13 PM   Result Value Ref Range    Renin Activity 0.3 ng/mL/hr   Aldosterone Renin Ratio    Collection Time: 03/14/24  5:13 PM   Result Value Ref Range    Aldosterone Renin Ratio 38.3 (H) 0.0 - 25.0   CBC with platelets and differential    Collection Time: 03/14/24  5:13 PM   Result Value Ref Range    WBC Count 3.8 (L) 4.0 - 11.0 10e3/uL    RBC Count 4.57 3.80 - 5.20 10e6/uL    Hemoglobin 13.8 11.7 - 15.7 g/dL    Hematocrit 40.1 35.0 - 47.0 %    MCV 88 78 - 100 fL    MCH 30.2 26.5 - 33.0 pg    MCHC 34.4 31.5 - 36.5 g/dL    RDW 11.9 10.0 - 15.0 %    Platelet Count 263 150 - 450 10e3/uL    % Neutrophils 37 %    % Lymphocytes 49 %    % Monocytes 11 %    % Eosinophils 2 %    % Basophils 1 %    % Immature Granulocytes 0 %    NRBCs per 100 WBC 0 <1 /100    Absolute Neutrophils 1.4 (L) 1.6 - 8.3 10e3/uL    Absolute Lymphocytes 1.8 0.8 - 5.3 10e3/uL    Absolute Monocytes 0.4 0.0 - 1.3 10e3/uL    Absolute Eosinophils 0.1 0.0 - 0.7 10e3/uL    Absolute Basophils 0.1 0.0 - 0.2 10e3/uL    Absolute Immature Granulocytes 0.0 <=0.4 10e3/uL    Absolute NRBCs 0.0 10e3/uL   Basic metabolic panel  (Ca, Cl, CO2, Creat, Gluc, K, Na, BUN)    Collection Time: 03/19/24 10:21 AM   Result Value Ref Range    Sodium 136 135 - 145 mmol/L    Potassium 5.1 3.4 - 5.3 mmol/L    Chloride 98 98 - 107 mmol/L    Carbon Dioxide (CO2) 30 (H) 22 - 29 mmol/L    Anion Gap 8  7 - 15 mmol/L    Urea Nitrogen 13.3 8.0 - 23.0 mg/dL    Creatinine 1.01 (H) 0.51 - 0.95 mg/dL    GFR Estimate 54 (L) >60 mL/min/1.73m2    Calcium 10.0 8.8 - 10.2 mg/dL    Glucose 109 (H) 70 - 99 mg/dL   Renal panel    Collection Time: 04/04/24  1:28 PM   Result Value Ref Range    Sodium 136 135 - 145 mmol/L    Potassium 5.1 3.4 - 5.3 mmol/L    Chloride 99 98 - 107 mmol/L    Carbon Dioxide (CO2) 26 22 - 29 mmol/L    Anion Gap 11 7 - 15 mmol/L    Glucose 98 70 - 99 mg/dL    Urea Nitrogen 17.8 8.0 - 23.0 mg/dL    Creatinine 1.10 (H) 0.51 - 0.95 mg/dL    GFR Estimate 49 (L) >60 mL/min/1.73m2    Calcium 10.0 8.8 - 10.2 mg/dL    Albumin 4.3 3.5 - 5.2 g/dL    Phosphorus 4.7 (H) 2.5 - 4.5 mg/dL   CBC with platelets    Collection Time: 04/04/24  1:28 PM   Result Value Ref Range    WBC Count 3.3 (L) 4.0 - 11.0 10e3/uL    RBC Count 4.51 3.80 - 5.20 10e6/uL    Hemoglobin 13.8 11.7 - 15.7 g/dL    Hematocrit 40.0 35.0 - 47.0 %    MCV 89 78 - 100 fL    MCH 30.6 26.5 - 33.0 pg    MCHC 34.5 31.5 - 36.5 g/dL    RDW 12.1 10.0 - 15.0 %    Platelet Count 208 150 - 450 10e3/uL   Lipid panel reflex to direct LDL Fasting    Collection Time: 04/04/24  1:28 PM   Result Value Ref Range    Cholesterol 257 (H) <200 mg/dL    Triglycerides 81 <150 mg/dL    Direct Measure HDL 97 >=50 mg/dL    LDL Cholesterol Calculated 144 (H) <=100 mg/dL    Non HDL Cholesterol 160 (H) <130 mg/dL   CRP, inflammation    Collection Time: 04/04/24  1:28 PM   Result Value Ref Range    CRP Inflammation <3.00 <5.00 mg/L   Protein  random urine    Collection Time: 04/04/24  1:47 PM   Result Value Ref Range    Total Protein Urine mg/dL 9.9   mg/dL    Total Protein Urine mg/mg Creat 0.07 0.00 - 0.20 mg/mg Cr    Creatinine Urine mg/dL 140.0 mg/dL   Albumin Random Urine Quantitative with Creat Ratio    Collection Time: 04/04/24  1:47 PM   Result Value Ref Range    Creatinine Urine mg/dL 140.0 mg/dL    Albumin Urine mg/L <12.0 mg/L    Albumin Urine mg/g Cr     UA with  Microscopic    Collection Time: 04/04/24  1:47 PM   Result Value Ref Range    Color Urine Light Yellow Colorless, Straw, Light Yellow, Yellow    Appearance Urine Clear Clear    Glucose Urine Negative Negative mg/dL    Bilirubin Urine Negative Negative    Ketones Urine Negative Negative mg/dL    Specific Gravity Urine 1.016 1.003 - 1.035    Blood Urine Negative Negative    pH Urine 6.5 5.0 - 7.0    Protein Albumin Urine Negative Negative mg/dL    Urobilinogen Urine Normal Normal, 2.0 mg/dL    Nitrite Urine Negative Negative    Leukocyte Esterase Urine Negative Negative    Mucus Urine Present (A) None Seen /LPF    RBC Urine <1 <=2 /HPF    WBC Urine <1 <=5 /HPF    Squamous Epithelials Urine <1 <=1 /HPF    Hyaline Casts Urine 3 (H) <=2 /LPF     CMP  Recent Labs   Lab Test 04/04/24  1328 03/19/24  1021 03/14/24  1713 07/27/23  1110 07/18/22  1129 07/15/21  1243 07/15/21  1243 06/29/20  1219 07/02/19  1719 05/14/18  1247 05/10/17  1201    136 130* 140 135   < > 140 139 134 139  --    POTASSIUM 5.1 5.1 4.9 4.2 3.6   < > 4.3 4.1 4.0 4.6 4.9   CHLORIDE 99 98 94* 102 108   < > 106 106 101 106  --    CO2 26 30* 26 28 27   < > 29 29 26 27  --    ANIONGAP 11 8 10 10 <1*   < > 5 4 6 6  --    GLC 98 109* 96 99 96   < > 90 89 89 90  --    BUN 17.8 13.3 16.2 14.9 12   < > 19 18 12 14  --    CR 1.10* 1.01* 0.92 0.98* 0.88   < > 1.02 0.86 0.87 0.90 0.95   GFRESTIMATED 49* 54* 61 57* 65   < > 51* 63 62 60* 57*   GFRESTBLACK  --   --   --   --   --   --   --  73 72 73 69   ROSS 10.0 10.0 9.9 9.9 9.0   < > 9.5 9.1 9.3 9.2  --    PHOS 4.7*  --   --   --   --   --   --   --   --   --   --    PROTTOTAL  --   --  7.4 6.8 6.6*  --  7.3 7.0  --   --   --    ALBUMIN 4.3  --  4.5 4.4 3.4   < > 3.8 3.7  --   --   --    BILITOTAL  --   --  0.6 0.7 0.8  --  1.0 0.8  --   --   --    ALKPHOS  --   --  81 81 73  --  80 90  --   --   --    AST  --   --  23 23 20  --  19 18  --   --   --    ALT  --   --  18 17 22  --  23 22  --   --   --     < > =  values in this interval not displayed.     CBC  Recent Labs   Lab Test 04/04/24  1328 03/14/24  1713 07/18/22  1129   HGB 13.8 13.8 12.5   WBC 3.3* 3.8* 3.4*   RBC 4.51 4.57 4.04   HCT 40.0 40.1 37.6   MCV 89 88 93   MCH 30.6 30.2 30.9   MCHC 34.5 34.4 33.2   RDW 12.1 11.9 12.0    263 211     INRNo lab results found.  ABGNo lab results found.   URINE STUDIES  Recent Labs   Lab Test 04/04/24  1347 03/14/24  1650 05/20/16  1205   COLOR Light Yellow Straw Straw   APPEARANCE Clear Clear Clear   URINEGLC Negative Negative Negative   URINEBILI Negative Negative Negative   URINEKETONE Negative Negative Negative   SG 1.016 1.004 1.002*   UBLD Negative Negative Negative   URINEPH 6.5 6.5 6.0   PROTEIN Negative Negative Negative   NITRITE Negative Negative Negative   LEUKEST Negative Negative Negative   RBCU <1  --  1   WBCU <1  --  <1     No lab results found.    ASSESSMENT AND PLAN:   #Hypertension  Review of the file shows that the patient had uncontrolled hypertension for many years but was not aware of it. While a component of renal artery stenosis is possible, there seems to be a lot of anxiety-driven high BP too. In addition, the patient has not been trying a regimen long enough to see if it works and I explained all this to her and her . Despite the fact that her renin aldosterone level is elevated, the aldosterone level is not high enough to consider that she could have hyperaldosteronism. I will change her regimen to lisinopril 20 mg twice daily and amlodipine 5 mg daily and she was instructed to start monitoring her BP in one week from now only  The patient was also instructed to keep the sodium intake around 2300 mg /day, follow and to avoid NSAIDs     #CKD  Mild and mainly due to uncontrolled BP and decline related to age. Proteinuria is well controlled on lisinopril    #CVD/dyslipidemia  Management per her PCP    #Blood count  Hemoglobin 13.8 normal    #Acid-base status  CO2 level 26  No need for  sodium bicarbonate supplementation    #Electrolytes  Na 136  K 5.1  No acute issues    #BMD  Calcium  10         Phosphorus  4.7  Albumin 4.3  Vitamin D level 79 in July 2023 no need for supplementation    #CKD journey/transplant not a candidate at this point    The total time of this encounter amounted to 60 minutes on the day of the encounter. This time included time spent with the patient, reviewing records, ordering tests, and performing post visit documentation.     The patient will return to follow up in one month    Anabel Perez MD  Division of Renal Disease and Hypertension  April 10, 2024  9:30 AM

## 2024-04-10 NOTE — PROGRESS NOTES
Nephrology Clinic    Marli Ponce MRN:[id number] YOB: 1940  Date of Service: 04/10/2024  Primary care provider: Khris Luciano  Requesting physician: Khris Luciano      REASON FOR CONSULT: Resistant hypertension    HISTORY OF PRESENT ILLNESS:   Marli Ponce is a 83 year old female who presents for evaluation of resistant hypertension.  The past medical history is significant for hypertension for more than 20 years but just noticed aq few months ago to be resistant and the patient reports that she first noted her blood pressure to be elevated when she took a cough medication in February. She subsequently presented to the ED on 2/21 with headache and a BP at 198/93. She was at that time on lisinopril 20 mg once daily only and it was increased to 40 mg once daily and amlodipine 5 mg was also added. However the patient didn't take and presented again on 3/6/24 to [facility] with hypertensive emergency. She was discharged on amlodipine 2.5 mg and lisinopril. She saw her PCP on 3/14 and a work up for secondary hypertension including renin, aldosterone levels, plasma free metanephrines and renal ultrasound with arterial duplex was sent and is essentially negative except for a possible right renal artery stenosis. Spironolactone was tried but the patient couldn't tolerate it. She is currently on lisinopril 20 mg twice daily only. Her BP is 125/73 in clinic but she reports that it is still uncontrolled at home especially in the morning. Her creatinine level is 1.1 on 4/4/24 close to her usual baseline. There is no evidence of any significant albuminuria.    The patient denies any dysuria, any pollakiuria, any nocturia, any LE edema, any dyspnea on exertion .  The patient denies ever having kidney stones, urinary tract infections, gross hematuria. There is no family history of CKD.  The following portions of the patient's history were reviewed and updated as appropriate: allergies,  current medications, past family history, past medical history, past social history, past surgical history and problem list.    ULTRASOUND RENAL COMPLETE WITH DOPPLER 3/22/2024 1:16 PM     CLINICAL HISTORY: Recent severe htn; Hypertensive urgency.       COMPARISONS: None available.     ORDERING PROVIDER: GENARO KRAUSE     TECHNIQUE: B-mode (grayscale) and duplex Doppler evaluation of the  abdominal aorta and renal arteries performed. Velocity measurements  obtained with angle correction at or less than 60 degrees.     Cine clips through the kidneys were saved in the patient's record.     Findings:     AORTA:       Suprarenal: 45/7 cm/s, arterial monophasic       Infrarenal, proximal: 92/0 cm/s, triphasic       Infrarenal, distal: 73/0 cm/s, triphasic     RIGHT KIDNEY:       Renal artery:            Origin: 123/18 cm/s, 124/23 cm/s            Proximal: 176/38 cm/s, 175/40 cm/s            Mid: 118/21 cm/s, 124/24 cm/s            HIlum: 85/16 cm/s          Renal artery - aortic ratio: 3.9          Renal vein: 18 cm/s          Length: 8.0 cm          Cortical thickness: 0.7 cm          Segmental artery resistive index (superior / mid / inferior):  0.75 / 0.80 / 0.79          Parenchyma: Normal. No stone, mass, or hydronephrosis.     LEFT KIDNEY:       Renal artery:            Origin: 98/18 cm/s            Proximal: 138/25 cm/s, 124/19 cm/s            Mid: 74/11 cm/s            Hilum: 67/11 cm/s          Renal artery - aortic ratio: 3.07          Renal vein: 12 cm/s          Length: 9.6 cm          Cortical thickness: 0.7 cm          Segmental artery resistive index (superior / mid / inferior):  0.75 / 0.77 / 0.77          Parenchyma: Normal. No stone, mass, or hydronephrosis.                                                                      IMPRESSION:   1. Right renal artery velocity is less than 180 cm/s, but the velocity  ratio exceeds 3.5 (3.9). Distal waveforms are not post stenotic in  appearance. If  clinically indicated and patient's renal function can  tolerate contrast, further evaluation with CTA could be performed.     2. Left renal artery stenosis not demonstrated.     3. Subcentimeter cortical thicknesses and elevated segmental artery  resistive indices suggest medical renal disease.     4. Negative grayscale appearance of the kidneys.     Guidelines:  Diagnostic criteria suggestive of > 60% diameter stenosis  Renal artery:       Peak systolic velocity > 180 cm/s       RAR > 3.5       Turbulent flow     Arcuate artery Resistive Index Normal < 0.7     I have personally reviewed the examination and initial interpretation  and I agree with the findings.     ELODIA DICKINSON MD     PAST MEDICAL HISTORY:  Past Medical History:   Diagnosis Date    Cataract     Hyperlipidemia LDL goal < 130     Hypertension     white coat; home blood pressure monitoring April 2016  average systolic blood pressure approximately 115.    Hypothyroidism     Low bone density 8/16/2017    FRAX tenure probability of fracture, major osteoporotic: 14.6%; hip fracture 4% (increased) Plan:  Alendronate 70 mg weekly; trial basis to assess for reflux esophagitis    Ocular hypertension     Primary open-angle glaucoma     adv     PAST SURGICAL HISTORY:  Past Surgical History:   Procedure Laterality Date    CHOLECYSTECTOMY      INJECT EPIDURAL LUMBAR N/A 7/6/2023    Procedure: INJECTION, SPINE, LUMBAR, EPIDURAL L5/S1;  Surgeon: Rusty Skelton MD;  Location: [facility] OR    LAPAROSCOPIC APPENDECTOMY      LASER IRIDOTOMY OD (RIGHT EYE)      RE/LE  pre 2003    LASER IRIDOTOMY OS (LEFT EYE)  11/20/2010    SELECTIVE LASER TRABECULOPLASTY (SLT) OS (LEFT EYE)  1/22/2010    LE    TRABECULECTOMY, MITOMYCIN FILTER, COMBINED  1/10/2012    LE     MEDICATIONS:  Prescription Medications as of 4/10/2024         Rx Number Disp Refills Start End Last Dispensed Date Next Fill Date Owning Pharmacy    ASPIRIN 81 PO  -- --  --       Sig: Take 81 mg by mouth daily    Class:  Historical    Route: Oral    levothyroxine (SYNTHROID/LEVOTHROID) 50 MCG tablet  90 tablet 3 7/28/2023 --   [facility] ST[facility] #[id number] [place] [address], [place] - [address] AT [place] [address]    Sig: Take 1 tablet (50 mcg) by mouth daily    Class: E-Prescribe    Route: Oral    lisinopril (ZESTRIL) 20 MG tablet  -- --  --       Sig: Take 20 mg by mouth 2 times daily    Class: Historical    Route: Oral    Multiple Vitamin (MULTIVITAMIN) per tablet  -- --  --       Sig: Take 1 tablet by mouth daily.    Class: Historical    Route: Oral    omeprazole (PRILOSEC) 20 MG DR capsule  90 capsule 2 7/21/2023 --   [facility] STORE #[id number] [place] [address], [place] - [address] AT [place] [address]    Sig: Take 1 capsule (20 mg) by mouth daily    Class: E-Prescribe    Route: Oral    travoprost BAK FREE (TRAVATAN Z) 0.004 % ophthalmic solution  2.5 mL 1 12/3/2020 --   [facility] STORE #[id number] [place] [address], [place] [address] [place] [address]    Sig: Place 1 drop into the right eye At Bedtime Call clinic to schedule follow up appointment. For additional refills, please schedule a follow-up appointment at [phone].  Past due for appt.    Class: E-Prescribe    Route: Right Eye    atorvastatin (LIPITOR) 10 MG tablet  90 tablet 3 8/2/2023 --   [id number] [place] [address], [place] - [address] AT [place] [address]    Sig: Take 1 tablet (10 mg) by mouth daily    Class: E-Prescribe    Route: Oral    cyclobenzaprine (FLEXERIL) 5 MG tablet  42 tablet 1 6/20/2023 --   [facility] #[id number] [place] [address], [place] [address] [facility] [address] [place] [address]    Sig: Take 1 tablet (5 mg) by mouth 3 times daily as needed for muscle spasms    Class: E-Prescribe    Route: Oral    hydrALAZINE (APRESOLINE) 10 MG tablet  40 tablet 1 4/4/2024 --   [facility] #[id number] - [place] [id number] AT [facility] [address]    Sig: Check blood pressure three times a  day. Take one tab po tid prn systolic blood pressure over 160; hold if below that.    Class: E-Prescribe           ALLERGIES:    No Known Allergies  REVIEW OF SYSTEMS:    A comprehensive review of systems was performed and found to be negative except as described here or above.  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    5 children one , 4 sons, 7 grandchildren 4 greatgrands                     Social Determinants of Health     Financial Resource Strain: Not on file   Food Insecurity: Not on file   Transportation Needs: Not on file   Physical Activity: Not on file   Stress: Not on file   Social Connections: Unknown (3/4/2024)    [facility]    Social Connections     Frequency of Communication with Friends and Family: Not on file   Interpersonal Safety: Not on file   Housing Stability: Not on file     FAMILY MEDICAL HISTORY:   Family History   Problem Relation Age of Onset    Cancer Mother 78        stomach and  at 80    Hypertension Mother     Glaucoma Mother     Breast Cancer Sister 38        still living    Hypertension Father     Cerebrovascular Disease Father 52    Glaucoma Maternal Grandfather      PHYSICAL EXAM:   /73   Pulse 54   Temp 98  F (36.7  C) (Oral)   Wt 54.7 kg (120 lb 9.6 oz)   SpO2 98%   BMI  23.55 kg/m    GENERAL APPEARANCE: alert and no distress  EYES: nonicteric  HENT: mouth without ulcers or lesions  NECK: supple, no adenopathy  RESP: lungs clear to auscultation   CV: regular rhythm, normal rate, no rub  ABDOMEN: soft, nontender, normal bowel sounds, no HSM   Extremities: no clubbing, cyanosis, or edema  MS: no evidence of inflammation in joints, no muscle tenderness  SKIN: no rash  NEURO: mentation intact and speech normal  PSYCH: affect normal/bright   LABS:   Recent Results (from the past 672 hour(s))   UA Macroscopic with reflex to Microscopic and Culture - Clinic Collect    Collection Time: 03/14/24  4:50 PM    Specimen: Urine, Midstream   Result Value Ref Range    Color Urine Straw Colorless, Straw, Light Yellow, Yellow    Appearance Urine Clear Clear    Glucose Urine Negative Negative mg/dL    Bilirubin Urine Negative Negative    Ketones Urine Negative Negative mg/dL    Specific Gravity Urine 1.004 1.003 - 1.035    Blood Urine Negative Negative    pH Urine 6.5 5.0 - 7.0    Protein Albumin Urine Negative Negative mg/dL    Urobilinogen Urine Normal Normal, 2.0 mg/dL    Nitrite Urine Negative Negative    Leukocyte Esterase Urine Negative Negative   Metanephrines Plasma Free    Collection Time: 03/14/24  5:13 PM   Result Value Ref Range    Metanephrine 0.24 0.00 - 0.49 nmol/L    Normetanephrine 0.84 0.00 - 0.89 nmol/L    Metanephrines Interpretation See Note    Comprehensive metabolic panel (BMP + Alb, Alk Phos, ALT, AST, Total. Bili, TP)    Collection Time: 03/14/24  5:13 PM   Result Value Ref Range    Sodium 130 (L) 135 - 145 mmol/L    Potassium 4.9 3.4 - 5.3 mmol/L    Carbon Dioxide (CO2) 26 22 - 29 mmol/L    Anion Gap 10 7 - 15 mmol/L    Urea Nitrogen 16.2 8.0 - 23.0 mg/dL    Creatinine 0.92 0.51 - 0.95 mg/dL    GFR Estimate 61 >60 mL/min/1.73m2    Calcium 9.9 8.8 - 10.2 mg/dL    Chloride 94 (L) 98 - 107 mmol/L    Glucose 96 70 - 99 mg/dL    Alkaline Phosphatase 81 40 - 150 U/L    AST 23 0 -  45 U/L    ALT 18 0 - 50 U/L    Protein Total 7.4 6.4 - 8.3 g/dL    Albumin 4.5 3.5 - 5.2 g/dL    Bilirubin Total 0.6 <=1.2 mg/dL   Aldosterone    Collection Time: 03/14/24  5:13 PM   Result Value Ref Range    Aldosterone 11.5 0.0 - 31.0 ng/dL   Renin activity    Collection Time: 03/14/24  5:13 PM   Result Value Ref Range    Renin Activity 0.3 ng/mL/hr   Aldosterone Renin Ratio    Collection Time: 03/14/24  5:13 PM   Result Value Ref Range    Aldosterone Renin Ratio 38.3 (H) 0.0 - 25.0   CBC with platelets and differential    Collection Time: 03/14/24  5:13 PM   Result Value Ref Range    WBC Count 3.8 (L) 4.0 - 11.0 10e3/uL    RBC Count 4.57 3.80 - 5.20 10e6/uL    Hemoglobin 13.8 11.7 - 15.7 g/dL    Hematocrit 40.1 35.0 - 47.0 %    MCV 88 78 - 100 fL    MCH 30.2 26.5 - 33.0 pg    MCHC 34.4 31.5 - 36.5 g/dL    RDW 11.9 10.0 - 15.0 %    Platelet Count 263 150 - 450 10e3/uL    % Neutrophils 37 %    % Lymphocytes 49 %    % Monocytes 11 %    % Eosinophils 2 %    % Basophils 1 %    % Immature Granulocytes 0 %    NRBCs per 100 WBC 0 <1 /100    Absolute Neutrophils 1.4 (L) 1.6 - 8.3 10e3/uL    Absolute Lymphocytes 1.8 0.8 - 5.3 10e3/uL    Absolute Monocytes 0.4 0.0 - 1.3 10e3/uL    Absolute Eosinophils 0.1 0.0 - 0.7 10e3/uL    Absolute Basophils 0.1 0.0 - 0.2 10e3/uL    Absolute Immature Granulocytes 0.0 <=0.4 10e3/uL    Absolute NRBCs 0.0 10e3/uL   Basic metabolic panel  (Ca, Cl, CO2, Creat, Gluc, K, Na, BUN)    Collection Time: 03/19/24 10:21 AM   Result Value Ref Range    Sodium 136 135 - 145 mmol/L    Potassium 5.1 3.4 - 5.3 mmol/L    Chloride 98 98 - 107 mmol/L    Carbon Dioxide (CO2) 30 (H) 22 - 29 mmol/L    Anion Gap 8 7 - 15 mmol/L    Urea Nitrogen 13.3 8.0 - 23.0 mg/dL    Creatinine 1.01 (H) 0.51 - 0.95 mg/dL    GFR Estimate 54 (L) >60 mL/min/1.73m2    Calcium 10.0 8.8 - 10.2 mg/dL    Glucose 109 (H) 70 - 99 mg/dL   Renal panel    Collection Time: 04/04/24  1:28 PM   Result Value Ref Range    Sodium 136 135 - 145  mmol/L    Potassium 5.1 3.4 - 5.3 mmol/L    Chloride 99 98 - 107 mmol/L    Carbon Dioxide (CO2) 26 22 - 29 mmol/L    Anion Gap 11 7 - 15 mmol/L    Glucose 98 70 - 99 mg/dL    Urea Nitrogen 17.8 8.0 - 23.0 mg/dL    Creatinine 1.10 (H) 0.51 - 0.95 mg/dL    GFR Estimate 49 (L) >60 mL/min/1.73m2    Calcium 10.0 8.8 - 10.2 mg/dL    Albumin 4.3 3.5 - 5.2 g/dL    Phosphorus 4.7 (H) 2.5 - 4.5 mg/dL   CBC with platelets    Collection Time: 04/04/24  1:28 PM   Result Value Ref Range    WBC Count 3.3 (L) 4.0 - 11.0 10e3/uL    RBC Count 4.51 3.80 - 5.20 10e6/uL    Hemoglobin 13.8 11.7 - 15.7 g/dL    Hematocrit 40.0 35.0 - 47.0 %    MCV 89 78 - 100 fL    MCH 30.6 26.5 - 33.0 pg    MCHC 34.5 31.5 - 36.5 g/dL    RDW 12.1 10.0 - 15.0 %    Platelet Count 208 150 - 450 10e3/uL   Lipid panel reflex to direct LDL Fasting    Collection Time: 04/04/24  1:28 PM   Result Value Ref Range    Cholesterol 257 (H) <200 mg/dL    Triglycerides 81 <150 mg/dL    Direct Measure HDL 97 >=50 mg/dL    LDL Cholesterol Calculated 144 (H) <=100 mg/dL    Non HDL Cholesterol 160 (H) <130 mg/dL   CRP, inflammation    Collection Time: 04/04/24  1:28 PM   Result Value Ref Range    CRP Inflammation <3.00 <5.00 mg/L   Protein  random urine    Collection Time: 04/04/24  1:47 PM   Result Value Ref Range    Total Protein Urine mg/dL 9.9   mg/dL    Total Protein Urine mg/mg Creat 0.07 0.00 - 0.20 mg/mg Cr    Creatinine Urine mg/dL 140.0 mg/dL   Albumin Random Urine Quantitative with Creat Ratio    Collection Time: 04/04/24  1:47 PM   Result Value Ref Range    Creatinine Urine mg/dL 140.0 mg/dL    Albumin Urine mg/L <12.0 mg/L    Albumin Urine mg/g Cr     UA with Microscopic    Collection Time: 04/04/24  1:47 PM   Result Value Ref Range    Color Urine Light Yellow Colorless, Straw, Light Yellow, Yellow    Appearance Urine Clear Clear    Glucose Urine Negative Negative mg/dL    Bilirubin Urine Negative Negative    Ketones Urine Negative Negative mg/dL    Specific  Gravity Urine 1.016 1.003 - 1.035    Blood Urine Negative Negative    pH Urine 6.5 5.0 - 7.0    Protein Albumin Urine Negative Negative mg/dL    Urobilinogen Urine Normal Normal, 2.0 mg/dL    Nitrite Urine Negative Negative    Leukocyte Esterase Urine Negative Negative    Mucus Urine Present (A) None Seen /LPF    RBC Urine <1 <=2 /HPF    WBC Urine <1 <=5 /HPF    Squamous Epithelials Urine <1 <=1 /HPF    Hyaline Casts Urine 3 (H) <=2 /LPF     CMP  Recent Labs   Lab Test 04/04/24  1328 03/19/24  1021 03/14/24  1713 07/27/23  1110 07/18/22  1129 07/15/21  1243 07/15/21  1243 06/29/20  1219 07/02/19  1719 05/14/18  1247 05/10/17  1201    136 130* 140 135   < > 140 139 134 139  --    POTASSIUM 5.1 5.1 4.9 4.2 3.6   < > 4.3 4.1 4.0 4.6 4.9   CHLORIDE 99 98 94* 102 108   < > 106 106 101 106  --    CO2 26 30* 26 28 27   < > 29 29 26 27  --    ANIONGAP 11 8 10 10 <1*   < > 5 4 6 6  --    GLC 98 109* 96 99 96   < > 90 89 89 90  --    BUN 17.8 13.3 16.2 14.9 12   < > 19 18 12 14  --    CR 1.10* 1.01* 0.92 0.98* 0.88   < > 1.02 0.86 0.87 0.90 0.95   GFRESTIMATED 49* 54* 61 57* 65   < > 51* 63 62 60* 57*   GFRESTBLACK  --   --   --   --   --   --   --  73 72 73 69   ROSS 10.0 10.0 9.9 9.9 9.0   < > 9.5 9.1 9.3 9.2  --    PHOS 4.7*  --   --   --   --   --   --   --   --   --   --    PROTTOTAL  --   --  7.4 6.8 6.6*  --  7.3 7.0  --   --   --    ALBUMIN 4.3  --  4.5 4.4 3.4   < > 3.8 3.7  --   --   --    BILITOTAL  --   --  0.6 0.7 0.8  --  1.0 0.8  --   --   --    ALKPHOS  --   --  81 81 73  --  80 90  --   --   --    AST  --   --  23 23 20  --  19 18  --   --   --    ALT  --   --  18 17 22  --  23 22  --   --   --     < > = values in this interval not displayed.     CBC  Recent Labs   Lab Test 04/04/24  1328 03/14/24  1713 07/18/22  1129   HGB 13.8 13.8 12.5   WBC 3.3* 3.8* 3.4*   RBC 4.51 4.57 4.04   HCT 40.0 40.1 37.6   MCV 89 88 93   MCH 30.6 30.2 30.9   MCHC 34.5 34.4 33.2   RDW 12.1 11.9 12.0    263 211      INRNo lab results found.  ABGNo lab results found.   URINE STUDIES  Recent Labs   Lab Test 04/04/24  1347 03/14/24  1650 05/20/16  1205   COLOR Light Yellow Straw Straw   APPEARANCE Clear Clear Clear   URINEGLC Negative Negative Negative   URINEBILI Negative Negative Negative   URINEKETONE Negative Negative Negative   SG 1.016 1.004 1.002*   UBLD Negative Negative Negative   URINEPH 6.5 6.5 6.0   PROTEIN Negative Negative Negative   NITRITE Negative Negative Negative   LEUKEST Negative Negative Negative   RBCU <1  --  1   WBCU <1  --  <1     No lab results found.    ASSESSMENT AND PLAN:   #Hypertension  Review of the file shows that the patient had uncontrolled hypertension for many years but was not aware of it. While a component of renal artery stenosis is possible, there seems to be a lot of anxiety-driven high BP too. In addition, the patient has not been trying a regimen long enough to see if it works and I explained all this to her and her . Despite the fact that her renin aldosterone level is elevated, the aldosterone level is not high enough to consider that she could have hyperaldosteronism. I will change her regimen to lisinopril 20 mg twice daily and amlodipine 5 mg daily and she was instructed to start monitoring her BP in one week from now only  The patient was also instructed to keep the sodium intake around 2300 mg /day, follow and to avoid NSAIDs     #CKD  Mild and mainly due to uncontrolled BP and decline related to age. Proteinuria is well controlled on lisinopril    #CVD/dyslipidemia  Management per her PCP    #Blood count  Hemoglobin 13.8 normal    #Acid-base status  CO2 level 26  No need for sodium bicarbonate supplementation    #Electrolytes  Na 136  K 5.1  No acute issues    #BMD  Calcium  10         Phosphorus  4.7  Albumin 4.3  Vitamin D level 79 in July 2023 no need for supplementation    #CKD journey/transplant not a candidate at this point    The total time of this encounter  amounted to 60 minutes on the day of the encounter. This time included time spent with the patient, reviewing records, ordering tests, and performing post visit documentation.     The patient will return to follow up in one month    Anabel Perez MD  Division of Renal Disease and Hypertension  April 10, 2024  9:30 AM

## 2024-04-10 NOTE — CONFIDENTIAL NOTE
DIAGNOSIS:   Hypertensive urgency    DATE RECEIVED:  04.10.2024    NOTES STATUS DETAILS   OFFICE NOTE from referring provider Internal 03.14.2024 Khris Luciano MD    OFFICE NOTE from other specialist  Internal 02.29.2024 Noemi Collins MD    *Only VASCULITIS or LUPUS gather office notes for the following     *PULMONARY       *ENT     *DERMATOLOGY     *RHEUMATOLOGY     DISCHARGE SUMMARY from hospital Care Everywhere 03.04.2024 [facility]   DISCHARGE REPORT from the ER Care Everywhere 02.21.2024 [facility]    MEDICATION LIST Internal    IMAGING  (NEED IMAGES AND REPORTS)     KIDNEY CT SCAN     KIDNEY ULTRASOUND Internal 03.22.2024 US Renal Complete   MR ABDOMEN     NUCLEAR MEDICINE RENAL     LABS     CBC Internal 04.04.2024   CMP Internal 04.04.2024   BMP Internal 04.04.2024   UA Internal 04.04.2024   URINE PROTEIN Internal 04.04.2024     RENAL PANEL Internal 04.04.2024    BIOPSY     KIDNEY BIOPSY

## 2024-04-10 NOTE — NURSING NOTE
Chief Complaint   Patient presents with    New Patient       Vitals:    04/10/24 0919 04/10/24 0927 04/10/24 0928   BP: 126/76 123/75 125/73   Pulse: 54     Temp: 98  F (36.7  C)     TempSrc: Oral     SpO2: 98%     Weight: 54.7 kg (120 lb 9.6 oz)         BP Readings from Last 3 Encounters:   04/10/24 125/73   04/04/24 (!) 144/85   03/19/24 133/72       /73   Pulse 54   Temp 98  F (36.7  C) (Oral)   Wt 54.7 kg (120 lb 9.6 oz)   SpO2 98%   BMI 23.55 kg/m       Harshal Pepe

## 2024-04-16 ENCOUNTER — TELEPHONE (OUTPATIENT)
Dept: NEPHROLOGY | Facility: CLINIC | Age: 84
End: 2024-04-16
Payer: MEDICARE

## 2024-04-16 DIAGNOSIS — I10 ESSENTIAL HYPERTENSION, BENIGN: Primary | ICD-10-CM

## 2024-04-16 NOTE — TELEPHONE ENCOUNTER
[facility] Call Center    Phone Message    May a detailed message be left on voicemail: no     Reason for Call: Other: Patients spouse called to follow up on medication they had requested for. Please call when this is submitted to the pharmacy.     Action Taken: Message routed to:  [facility] Nephrology    Travel Screening: Not Applicable

## 2024-04-16 NOTE — TELEPHONE ENCOUNTER
[facility] Call Center    Phone Message    May a detailed message be left on voicemail: yes     Reason for Call: Medication Refill Request    Has the patient contacted the pharmacy for the refill? Yes   Name of medication being requested: lisinopril (ZESTRIL) 20 MG tablet  Provider who prescribed the medication: Dr. Perez  Pharmacy: [facility] - [address] [place] [id number]  Date medication is needed: ASAP    Patient is almost out.    Action Taken: Message routed to:  [facility] Nephrology    Travel Screening: Not Applicable

## 2024-04-17 RX ORDER — LISINOPRIL 20 MG/1
20 TABLET ORAL 2 TIMES DAILY
Qty: 60 TABLET | Refills: 11 | Status: SHIPPED | OUTPATIENT
Start: 2024-04-17 | End: 2024-05-06

## 2024-04-17 NOTE — TELEPHONE ENCOUNTER
"Per Dr. Perez's note on 4/10/24, \"I will change her regimen to lisinopril 20 mg twice daily and amlodipine 5 mg daily and she was instructed to start monitoring her BP in one week from now only.\"      Dr. Perez only sent amlodipine at that time. Please send Lisinopril 20 mg BID.  Thank you.    Gene Aviles RN Care Coordinator  Primary Care Clinic  Phone [phone]  Fax     [phone]  "

## 2024-04-17 NOTE — TELEPHONE ENCOUNTER
Per Dr. Perez's last note on 4/20/2024 will order lisinopril as she prescribed.  -Please start amlodipine 5 mg once daily  -Please take lisinopril 20 mg twice daily in the morning and in the evening  -Please do labs in one month before your next appointment

## 2024-04-21 DIAGNOSIS — K21.9 GASTROESOPHAGEAL REFLUX DISEASE WITHOUT ESOPHAGITIS: ICD-10-CM

## 2024-04-25 ENCOUNTER — MYC MEDICAL ADVICE (OUTPATIENT)
Dept: NEPHROLOGY | Facility: CLINIC | Age: 84
End: 2024-04-25
Payer: MEDICARE

## 2024-04-28 NOTE — TELEPHONE ENCOUNTER
omeprazole (PRILOSEC) 20 MG DR capsule 90 capsule 2 7/21/2023       Last Office Visit: 4/4/24  Future Office visit:   8/5/24      PPI Protocol Passed        Refill protocol passed    Alana Jacobs RN  P Red Flag Triage/MRT

## 2024-05-06 ENCOUNTER — LAB (OUTPATIENT)
Dept: LAB | Facility: CLINIC | Age: 84
End: 2024-05-06
Payer: MEDICARE

## 2024-05-06 ENCOUNTER — OFFICE VISIT (OUTPATIENT)
Dept: NEPHROLOGY | Facility: CLINIC | Age: 84
End: 2024-05-06
Attending: INTERNAL MEDICINE
Payer: MEDICARE

## 2024-05-06 VITALS
TEMPERATURE: 97.8 F | BODY MASS INDEX: 23.49 KG/M2 | HEART RATE: 50 BPM | DIASTOLIC BLOOD PRESSURE: 79 MMHG | OXYGEN SATURATION: 99 % | WEIGHT: 120.3 LBS | SYSTOLIC BLOOD PRESSURE: 135 MMHG

## 2024-05-06 DIAGNOSIS — I10 ESSENTIAL HYPERTENSION, BENIGN: Primary | ICD-10-CM

## 2024-05-06 DIAGNOSIS — I10 ESSENTIAL HYPERTENSION, BENIGN: ICD-10-CM

## 2024-05-06 LAB
ALBUMIN MFR UR ELPH: 8.9 MG/DL
ALBUMIN SERPL BCG-MCNC: 4.1 G/DL (ref 3.5–5.2)
ALBUMIN UR-MCNC: NEGATIVE MG/DL
ALP SERPL-CCNC: 77 U/L (ref 40–150)
ALT SERPL W P-5'-P-CCNC: 15 U/L (ref 0–50)
ANION GAP SERPL CALCULATED.3IONS-SCNC: 9 MMOL/L (ref 7–15)
APPEARANCE UR: CLEAR
AST SERPL W P-5'-P-CCNC: 21 U/L (ref 0–45)
BILIRUB SERPL-MCNC: 0.8 MG/DL
BILIRUB UR QL STRIP: NEGATIVE
BUN SERPL-MCNC: 19.8 MG/DL (ref 8–23)
CALCIUM SERPL-MCNC: 9.8 MG/DL (ref 8.8–10.2)
CHLORIDE SERPL-SCNC: 103 MMOL/L (ref 98–107)
COLOR UR AUTO: NORMAL
CREAT SERPL-MCNC: 1.12 MG/DL (ref 0.51–0.95)
CREAT UR-MCNC: 130 MG/DL
CREAT UR-MCNC: 131 MG/DL
DEPRECATED HCO3 PLAS-SCNC: 26 MMOL/L (ref 22–29)
EGFRCR SERPLBLD CKD-EPI 2021: 48 ML/MIN/1.73M2
ERYTHROCYTE [DISTWIDTH] IN BLOOD BY AUTOMATED COUNT: 12.3 % (ref 10–15)
GLUCOSE SERPL-MCNC: 100 MG/DL (ref 70–99)
GLUCOSE UR STRIP-MCNC: NEGATIVE MG/DL
HCT VFR BLD AUTO: 40.2 % (ref 35–47)
HGB BLD-MCNC: 13.6 G/DL (ref 11.7–15.7)
HGB UR QL STRIP: NEGATIVE
KETONES UR STRIP-MCNC: NEGATIVE MG/DL
LEUKOCYTE ESTERASE UR QL STRIP: NEGATIVE
MCH RBC QN AUTO: 30.7 PG (ref 26.5–33)
MCHC RBC AUTO-ENTMCNC: 33.8 G/DL (ref 31.5–36.5)
MCV RBC AUTO: 91 FL (ref 78–100)
MICROALBUMIN UR-MCNC: <12 MG/L
MICROALBUMIN/CREAT UR: NORMAL MG/G{CREAT}
NITRATE UR QL: NEGATIVE
PH UR STRIP: 6.5 [PH] (ref 5–7)
PLATELET # BLD AUTO: 214 10E3/UL (ref 150–450)
POTASSIUM SERPL-SCNC: 5.3 MMOL/L (ref 3.4–5.3)
PROT SERPL-MCNC: 6.6 G/DL (ref 6.4–8.3)
PROT/CREAT 24H UR: 0.07 MG/MG CR (ref 0–0.2)
RBC # BLD AUTO: 4.43 10E6/UL (ref 3.8–5.2)
SODIUM SERPL-SCNC: 138 MMOL/L (ref 135–145)
SP GR UR STRIP: 1.02 (ref 1–1.03)
UROBILINOGEN UR STRIP-MCNC: NORMAL MG/DL
WBC # BLD AUTO: 3.6 10E3/UL (ref 4–11)

## 2024-05-06 PROCEDURE — 85027 COMPLETE CBC AUTOMATED: CPT | Performed by: PATHOLOGY

## 2024-05-06 PROCEDURE — 99214 OFFICE O/P EST MOD 30 MIN: CPT | Performed by: INTERNAL MEDICINE

## 2024-05-06 PROCEDURE — G0463 HOSPITAL OUTPT CLINIC VISIT: HCPCS | Performed by: INTERNAL MEDICINE

## 2024-05-06 PROCEDURE — 81003 URINALYSIS AUTO W/O SCOPE: CPT | Performed by: PATHOLOGY

## 2024-05-06 PROCEDURE — 80053 COMPREHEN METABOLIC PANEL: CPT | Performed by: PATHOLOGY

## 2024-05-06 PROCEDURE — 82043 UR ALBUMIN QUANTITATIVE: CPT | Performed by: INTERNAL MEDICINE

## 2024-05-06 PROCEDURE — 84156 ASSAY OF PROTEIN URINE: CPT | Performed by: PATHOLOGY

## 2024-05-06 PROCEDURE — 36415 COLL VENOUS BLD VENIPUNCTURE: CPT | Performed by: PATHOLOGY

## 2024-05-06 PROCEDURE — 99000 SPECIMEN HANDLING OFFICE-LAB: CPT | Performed by: PATHOLOGY

## 2024-05-06 RX ORDER — AMLODIPINE BESYLATE 5 MG/1
5 TABLET ORAL DAILY
Qty: 360 TABLET | Refills: 0 | Status: SHIPPED | OUTPATIENT
Start: 2024-05-06 | End: 2025-05-06

## 2024-05-06 RX ORDER — LISINOPRIL 20 MG/1
20 TABLET ORAL DAILY
Qty: 90 TABLET | Refills: 3 | Status: SHIPPED | OUTPATIENT
Start: 2024-05-06 | End: 2025-05-06

## 2024-05-06 RX ORDER — VITAMIN B COMPLEX
1000 TABLET ORAL DAILY
COMMUNITY

## 2024-05-06 RX ORDER — BRIMONIDINE TARTRATE AND TIMOLOL MALEATE 2; 5 MG/ML; MG/ML
1 SOLUTION OPHTHALMIC 2 TIMES DAILY
COMMUNITY

## 2024-05-06 ASSESSMENT — PAIN SCALES - GENERAL: PAINLEVEL: NO PAIN (0)

## 2024-05-06 NOTE — PATIENT INSTRUCTIONS
-Please decrease lisinopril to 20 mg once daily   -Please stop vitamin D supplementation  -Please return to clinic in one year with labs

## 2024-05-06 NOTE — LETTER
5/6/2024       RE: Marli Ponce  [address] [facility] [address]     Dear Colleague,    Thank you for referring your patient, Marli Ponce, to the [facility] NEPHROLOGY [facility]INIC [place] at [facility]. Please see a copy of my visit note below.    Nephrology Clinic    Marli Ponce MRN:[id number] YOB: 1940  Date of Service: 05/06/2024  Primary care provider: Khris Luciano  Requesting physician: Khris Luciano      REASON FOR CONSULT: Resistant hypertension    HISTORY OF PRESENT ILLNESS:   Marli Ponce is a 83 year old female who first presented for evaluation of resistant hypertension in  April 2024.  The past medical history is significant for hypertension for more than 20 years but just noticed aq few months ago to be resistant and the patient reports that she first noted her blood pressure to be elevated when she took a cough medication in February. She subsequently presented to the ED on 2/21 with headache and a BP at 198/93. She was at that time on lisinopril 20 mg once daily only and it was increased to 40 mg once daily and amlodipine 5 mg was also added. However the patient didn't take and presented again on 3/6/24 to [facility] with hypertensive emergency. She was discharged on amlodipine 2.5 mg and lisinopril. She saw her PCP on 3/14 and a work up for secondary hypertension including renin, aldosterone levels, plasma free metanephrines and renal ultrasound with arterial duplex was sent and is essentially negative except for a possible right renal artery stenosis. Spironolactone was tried but the patient couldn't tolerate it. She was at the time of the visit on lisinopril 20 mg twice daily only. Her BP was 125/73 in clinic but she reported that it was still uncontrolled at home especially in the morning. Her creatinine level was 1.1 on 4/4/24 close to her usual baseline. There was no evidence of any  significant albuminuria. At her first visit, amlodipine 5 mg daily was added to lisinopril 20 mg twice daily. She brings today a BP diary that shows a blood pressure that is very well controlled and even low at times. A repeat creatinine level is 1.12 mg/dl and a potassium level is 5.3.    The patient denies any dysuria, any pollakiuria, any nocturia, any LE edema, any dyspnea on exertion .  The patient denies ever having kidney stones, urinary tract infections, gross hematuria. There is no family history of CKD.  The following portions of the patient's history were reviewed and updated as appropriate: allergies, current medications, past family history, past medical history, past social history, past surgical history and problem list.    ULTRASOUND RENAL COMPLETE WITH DOPPLER 3/22/2024 1:16 PM     CLINICAL HISTORY: Recent severe htn; Hypertensive urgency.       COMPARISONS: None available.     ORDERING PROVIDER: GENARO KRAUSE     TECHNIQUE: B-mode (grayscale) and duplex Doppler evaluation of the  abdominal aorta and renal arteries performed. Velocity measurements  obtained with angle correction at or less than 60 degrees.     Cine clips through the kidneys were saved in the patient's record.     Findings:     AORTA:       Suprarenal: 45/7 cm/s, arterial monophasic       Infrarenal, proximal: 92/0 cm/s, triphasic       Infrarenal, distal: 73/0 cm/s, triphasic     RIGHT KIDNEY:       Renal artery:            Origin: 123/18 cm/s, 124/23 cm/s            Proximal: 176/38 cm/s, 175/40 cm/s            Mid: 118/21 cm/s, 124/24 cm/s            HIlum: 85/16 cm/s          Renal artery - aortic ratio: 3.9          Renal vein: 18 cm/s          Length: 8.0 cm          Cortical thickness: 0.7 cm          Segmental artery resistive index (superior / mid / inferior):  0.75 / 0.80 / 0.79          Parenchyma: Normal. No stone, mass, or hydronephrosis.     LEFT KIDNEY:       Renal artery:            Origin: 98/18 cm/s             Proximal: 138/25 cm/s, 124/19 cm/s            Mid: 74/11 cm/s            Hilum: 67/11 cm/s          Renal artery - aortic ratio: 3.07          Renal vein: 12 cm/s          Length: 9.6 cm          Cortical thickness: 0.7 cm          Segmental artery resistive index (superior / mid / inferior):  0.75 / 0.77 / 0.77          Parenchyma: Normal. No stone, mass, or hydronephrosis.                                                                      IMPRESSION:   1. Right renal artery velocity is less than 180 cm/s, but the velocity  ratio exceeds 3.5 (3.9). Distal waveforms are not post stenotic in  appearance. If clinically indicated and patient's renal function can  tolerate contrast, further evaluation with CTA could be performed.     2. Left renal artery stenosis not demonstrated.     3. Subcentimeter cortical thicknesses and elevated segmental artery  resistive indices suggest medical renal disease.     4. Negative grayscale appearance of the kidneys.     Guidelines:  Diagnostic criteria suggestive of > 60% diameter stenosis  Renal artery:       Peak systolic velocity > 180 cm/s       RAR > 3.5       Turbulent flow     Arcuate artery Resistive Index Normal < 0.7     I have personally reviewed the examination and initial interpretation  and I agree with the findings.     ELODIA DICKINSON MD     PAST MEDICAL HISTORY:  Past Medical History:   Diagnosis Date    Cataract     Hyperlipidemia LDL goal < 130     Hypertension     white coat; home blood pressure monitoring April 2016  average systolic blood pressure approximately 115.    Hypothyroidism     Low bone density 8/16/2017    FRAX tenure probability of fracture, major osteoporotic: 14.6%; hip fracture 4% (increased) Plan:  Alendronate 70 mg weekly; trial basis to assess for reflux esophagitis    Ocular hypertension     Primary open-angle glaucoma     adv     PAST SURGICAL HISTORY:  Past Surgical History:   Procedure Laterality Date    CHOLECYSTECTOMY      INJECT EPIDURAL  LUMBAR N/A 7/6/2023    Procedure: INJECTION, SPINE, LUMBAR, EPIDURAL L5/S1;  Surgeon: Rusty Skelton MD;  Location: [facility] OR    LAPAROSCOPIC APPENDECTOMY      LASER IRIDOTOMY OD (RIGHT EYE)      RE/LE  pre 2003    LASER IRIDOTOMY OS (LEFT EYE)  11/20/2010    SELECTIVE LASER TRABECULOPLASTY (SLT) OS (LEFT EYE)  1/22/2010    LE    TRABECULECTOMY, MITOMYCIN FILTER, COMBINED  1/10/2012    LE     MEDICATIONS:  Prescription Medications as of 5/6/2024         Rx Number Disp Refills Start End Last Dispensed Date Next Fill Date Owning Pharmacy    amLODIPine (NORVASC) 5 MG tablet  90 tablet 1 4/10/2024 10/7/2024   [facility] [address] [place] [address], [place] - [address] AT [place] [address]    Sig: Take 1 tablet (5 mg) by mouth daily for 180 days    Class: E-Prescribe    Route: Oral    ASPIRIN 81 PO  -- --  --       Sig: Take 81 mg by mouth daily    Class: Historical    Route: Oral    hydrALAZINE (APRESOLINE) 10 MG tablet  40 tablet 1 4/4/2024 --   [facility] [address] [place] [address], [place] - [address] AT [place] [address]    Sig: Check blood pressure three times a day. Take one tab po tid prn systolic blood pressure over 160; hold if below that.    Class: E-Prescribe    levothyroxine (SYNTHROID/LEVOTHROID) 50 MCG tablet  90 tablet 3 7/28/2023 --   [facility] [address] [place] [address], [place] - [address] AT [place] [address]    Sig: Take 1 tablet (50 mcg) by mouth daily    Class: E-Prescribe    Route: Oral    lisinopril (ZESTRIL) 20 MG tablet  60 tablet 11 4/17/2024 --   [id number], [place] - [address] AT [place] [address]    Sig: Take 1 tablet (20 mg) by mouth 2 times daily    Class: E-Prescribe    Route: Oral    Multiple Vitamin (MULTIVITAMIN) per tablet  -- --  --       Sig: Take 1 tablet by mouth daily.    Class: Historical    Route: Oral    omeprazole (PRILOSEC) 20 MG DR capsule  90 capsule 2 4/28/2024 --   [facility]  #[id number]  [place] - [address] AT [facility]    Sig: Take 1 capsule (20 mg) by mouth daily    Class: E-Prescribe    Route: Oral    travoprost CARMELA FREE (TRAVATAN Z) 0.004 % ophthalmic solution  2.5 mL 1 12/3/2020 --   [id number] - [place] - [address] AT [place] [address]    Sig: Place 1 drop into the right eye At Bedtime Call clinic to schedule follow up appointment. For additional refills, please schedule a follow-up appointment at [phone].  Past due for appt.    Class: E-Prescribe    Route: Right Eye           ALLERGIES:    No Known Allergies  REVIEW OF SYSTEMS:    A comprehensive review of systems was performed and found to be negative except as described here or above.  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    5 children one , 4 sons, 7 grandchildren 4 greatgrands    G5                 Social Determinants of Health     Financial Resource Strain: Not on file   Food Insecurity: Not on file   Transportation Needs: Not on file   Physical Activity: Not on file   Stress: Not on file   Social Connections: Unknown (3/4/2024)    [facility]    Social Connections     Frequency of Communication with Friends and Family: Not on file   Interpersonal Safety: Not on  file   Housing Stability: Not on file     FAMILY MEDICAL HISTORY:   Family History   Problem Relation Age of Onset    Cancer Mother 78        stomach and  at 80    Hypertension Mother     Glaucoma Mother     Breast Cancer Sister 38        still living    Hypertension Father     Cerebrovascular Disease Father 52    Glaucoma Maternal Grandfather      PHYSICAL EXAM:   There were no vitals taken for this visit.  GENERAL APPEARANCE: alert and no distress  EYES: nonicteric  HENT: mouth without ulcers or lesions  NECK: supple, no adenopathy  RESP: lungs clear to auscultation   CV: regular rhythm, normal rate, no rub  ABDOMEN: soft, nontender, normal bowel sounds, no HSM   Extremities: no clubbing, cyanosis, or edema  MS: no evidence of inflammation in joints, no muscle tenderness  SKIN: no rash  NEURO: mentation intact and speech normal  PSYCH: affect normal/bright   LABS:   Recent Results (from the past 672 hour(s))   CBC with platelets    Collection Time: 24 12:39 PM   Result Value Ref Range    WBC Count 3.6 (L) 4.0 - 11.0 10e3/uL    RBC Count 4.43 3.80 - 5.20 10e6/uL    Hemoglobin 13.6 11.7 - 15.7 g/dL    Hematocrit 40.2 35.0 - 47.0 %    MCV 91 78 - 100 fL    MCH 30.7 26.5 - 33.0 pg    MCHC 33.8 31.5 - 36.5 g/dL    RDW 12.3 10.0 - 15.0 %    Platelet Count 214 150 - 450 10e3/uL     CMP  Recent Labs   Lab Test 24  1328 24  1021 24  1713 23  1110 22  1129 07/15/21  1243 07/15/21  1243 20  1219 19  1719 18  1247 05/10/17  1201    136 130* 140 135   < > 140 139 134 139  --    POTASSIUM 5.1 5.1 4.9 4.2 3.6   < > 4.3 4.1 4.0 4.6 4.9   CHLORIDE 99 98 94* 102 108   < > 106 106 101 106  --    CO2 26 30* 26 28 27   < > 29 29 26 27  --    ANIONGAP 11 8 10 10 <1*   < > 5 4 6 6  --    GLC 98 109* 96 99 96   < > 90 89 89 90  --    BUN 17.8 13.3 16.2 14.9 12   < > 19 18 12 14  --    CR 1.10* 1.01* 0.92 0.98* 0.88   < > 1.02 0.86 0.87 0.90 0.95   GFRESTIMATED 49* 54* 61 57*  65   < > 51* 63 62 60* 57*   GFRESTBLACK  --   --   --   --   --   --   --  73 72 73 69   ROSS 10.0 10.0 9.9 9.9 9.0   < > 9.5 9.1 9.3 9.2  --    PHOS 4.7*  --   --   --   --   --   --   --   --   --   --    PROTTOTAL  --   --  7.4 6.8 6.6*  --  7.3 7.0  --   --   --    ALBUMIN 4.3  --  4.5 4.4 3.4   < > 3.8 3.7  --   --   --    BILITOTAL  --   --  0.6 0.7 0.8  --  1.0 0.8  --   --   --    ALKPHOS  --   --  81 81 73  --  80 90  --   --   --    AST  --   --  23 23 20  --  19 18  --   --   --    ALT  --   --  18 17 22  --  23 22  --   --   --     < > = values in this interval not displayed.     CBC  Recent Labs   Lab Test 05/06/24  1239 04/04/24  1328 03/14/24  1713 07/18/22  1129   HGB 13.6 13.8 13.8 12.5   WBC 3.6* 3.3* 3.8* 3.4*   RBC 4.43 4.51 4.57 4.04   HCT 40.2 40.0 40.1 37.6   MCV 91 89 88 93   MCH 30.7 30.6 30.2 30.9   MCHC 33.8 34.5 34.4 33.2   RDW 12.3 12.1 11.9 12.0    208 263 211     INRNo lab results found.  ABGNo lab results found.   URINE STUDIES  Recent Labs   Lab Test 04/04/24  1347 03/14/24  1650 05/20/16  1205   COLOR Light Yellow Straw Straw   APPEARANCE Clear Clear Clear   URINEGLC Negative Negative Negative   URINEBILI Negative Negative Negative   URINEKETONE Negative Negative Negative   SG 1.016 1.004 1.002*   UBLD Negative Negative Negative   URINEPH 6.5 6.5 6.0   PROTEIN Negative Negative Negative   NITRITE Negative Negative Negative   LEUKEST Negative Negative Negative   RBCU <1  --  1   WBCU <1  --  <1     No lab results found.    ASSESSMENT AND PLAN:   #Hypertension  Review of the file shows that the patient had uncontrolled hypertension for many years but was not aware of it. While a component of renal artery stenosis is possible, there seems to be a lot of anxiety-driven high BP too. In addition, the patient has not been trying a regimen long enough to see if it works and I explained all this to her and her . Despite the fact that her renin aldosterone level is elevated, the  aldosterone level is not high enough to consider that she could have hyperaldosteronism. I will changed her regimen to lisinopril 20 mg twice daily and amlodipine 5 mg daily and she is now borderline hypotensive, therefore I will decrease the lisinopril to 20 mg once daily.  The patient was also instructed to keep the sodium intake around 2300 mg /day, follow and to avoid NSAIDs     #CKD  Mild and mainly due to uncontrolled BP and decline related to age. Proteinuria is well controlled on lisinopril    #CVD/dyslipidemia  Management per her PCP    #Blood count  Hemoglobin 13.8 normal    #Acid-base status  CO2 level 26  No need for sodium bicarbonate supplementation    #Electrolytes  Na 136  K 5.1-> 5.3 should improve with decreasing lisinopril to once daily  No acute issues    #BMD  Calcium  10         Phosphorus  4.7  Albumin 4.3  Vitamin D level 79 in July 2023 I stopped vitamin D supplementation    #CKD journey/transplant not a candidate at this point    The total time of this encounter amounted to 30 minutes on the day of the encounter. This time included time spent with the patient, reviewing records, ordering tests, and performing post visit documentation.     The patient will return to follow up in one year    Anabel Perez MD  Division of Renal Diseases and Hypertension      Again, thank you for allowing me to participate in the care of your patient.      Sincerely,    Anabel Perez MD

## 2024-05-06 NOTE — PROGRESS NOTES
Nephrology Clinic    Marli Ponce MRN:[id number] YOB: 1940  Date of Service: 05/06/2024  Primary care provider: Khris Luciano  Requesting physician: Khris Luciano      REASON FOR CONSULT: Resistant hypertension    HISTORY OF PRESENT ILLNESS:   Marli Ponce is a 83 year old female who first presented for evaluation of resistant hypertension in  April 2024.  The past medical history is significant for hypertension for more than 20 years but just noticed aq few months ago to be resistant and the patient reports that she first noted her blood pressure to be elevated when she took a cough medication in February. She subsequently presented to the ED on 2/21 with headache and a BP at 198/93. She was at that time on lisinopril 20 mg once daily only and it was increased to 40 mg once daily and amlodipine 5 mg was also added. However the patient didn't take and presented again on 3/6/24 to [facility] with hypertensive emergency. She was discharged on amlodipine 2.5 mg and lisinopril. She saw her PCP on 3/14 and a work up for secondary hypertension including renin, aldosterone levels, plasma free metanephrines and renal ultrasound with arterial duplex was sent and is essentially negative except for a possible right renal artery stenosis. Spironolactone was tried but the patient couldn't tolerate it. She was at the time of the visit on lisinopril 20 mg twice daily only. Her BP was 125/73 in clinic but she reported that it was still uncontrolled at home especially in the morning. Her creatinine level was 1.1 on 4/4/24 close to her usual baseline. There was no evidence of any significant albuminuria. At her first visit, amlodipine 5 mg daily was added to lisinopril 20 mg twice daily. She brings today a BP diary that shows a blood pressure that is very well controlled and even low at times. A repeat creatinine level is 1.12 mg/dl and a potassium level is 5.3.    The patient denies any  dysuria, any pollakiuria, any nocturia, any LE edema, any dyspnea on exertion .  The patient denies ever having kidney stones, urinary tract infections, gross hematuria. There is no family history of CKD.  The following portions of the patient's history were reviewed and updated as appropriate: allergies, current medications, past family history, past medical history, past social history, past surgical history and problem list.    ULTRASOUND RENAL COMPLETE WITH DOPPLER 3/22/2024 1:16 PM     CLINICAL HISTORY: Recent severe htn; Hypertensive urgency.       COMPARISONS: None available.     ORDERING PROVIDER: GENARO KRAUSE     TECHNIQUE: B-mode (grayscale) and duplex Doppler evaluation of the  abdominal aorta and renal arteries performed. Velocity measurements  obtained with angle correction at or less than 60 degrees.     Cine clips through the kidneys were saved in the patient's record.     Findings:     AORTA:       Suprarenal: 45/7 cm/s, arterial monophasic       Infrarenal, proximal: 92/0 cm/s, triphasic       Infrarenal, distal: 73/0 cm/s, triphasic     RIGHT KIDNEY:       Renal artery:            Origin: 123/18 cm/s, 124/23 cm/s            Proximal: 176/38 cm/s, 175/40 cm/s            Mid: 118/21 cm/s, 124/24 cm/s            HIlum: 85/16 cm/s          Renal artery - aortic ratio: 3.9          Renal vein: 18 cm/s          Length: 8.0 cm          Cortical thickness: 0.7 cm          Segmental artery resistive index (superior / mid / inferior):  0.75 / 0.80 / 0.79          Parenchyma: Normal. No stone, mass, or hydronephrosis.     LEFT KIDNEY:       Renal artery:            Origin: 98/18 cm/s            Proximal: 138/25 cm/s, 124/19 cm/s            Mid: 74/11 cm/s            Hilum: 67/11 cm/s          Renal artery - aortic ratio: 3.07          Renal vein: 12 cm/s          Length: 9.6 cm          Cortical thickness: 0.7 cm          Segmental artery resistive index (superior / mid / inferior):  0.75 / 0.77 / 0.77           Parenchyma: Normal. No stone, mass, or hydronephrosis.                                                                      IMPRESSION:   1. Right renal artery velocity is less than 180 cm/s, but the velocity  ratio exceeds 3.5 (3.9). Distal waveforms are not post stenotic in  appearance. If clinically indicated and patient's renal function can  tolerate contrast, further evaluation with CTA could be performed.     2. Left renal artery stenosis not demonstrated.     3. Subcentimeter cortical thicknesses and elevated segmental artery  resistive indices suggest medical renal disease.     4. Negative grayscale appearance of the kidneys.     Guidelines:  Diagnostic criteria suggestive of > 60% diameter stenosis  Renal artery:       Peak systolic velocity > 180 cm/s       RAR > 3.5       Turbulent flow     Arcuate artery Resistive Index Normal < 0.7     I have personally reviewed the examination and initial interpretation  and I agree with the findings.     ELODIA DICKINSON MD     PAST MEDICAL HISTORY:  Past Medical History:   Diagnosis Date    Cataract     Hyperlipidemia LDL goal < 130     Hypertension     white coat; home blood pressure monitoring April 2016  average systolic blood pressure approximately 115.    Hypothyroidism     Low bone density 8/16/2017    FRAX tenure probability of fracture, major osteoporotic: 14.6%; hip fracture 4% (increased) Plan:  Alendronate 70 mg weekly; trial basis to assess for reflux esophagitis    Ocular hypertension     Primary open-angle glaucoma     adv     PAST SURGICAL HISTORY:  Past Surgical History:   Procedure Laterality Date    CHOLECYSTECTOMY      INJECT EPIDURAL LUMBAR N/A 7/6/2023    Procedure: INJECTION, SPINE, LUMBAR, EPIDURAL L5/S1;  Surgeon: Rusty Skelton MD;  Location: [facility] OR    LAPAROSCOPIC APPENDECTOMY      LASER IRIDOTOMY OD (RIGHT EYE)      RE/LE  pre 2003    LASER IRIDOTOMY OS (LEFT EYE)  11/20/2010    SELECTIVE LASER TRABECULOPLASTY (SLT) OS (LEFT EYE)   1/22/2010    LE    TRABECULECTOMY, MITOMYCIN FILTER, COMBINED  1/10/2012    LE     MEDICATIONS:  Prescription Medications as of 5/6/2024         Rx Number Disp Refills Start End Last Dispensed Date Next Fill Date Owning Pharmacy    amLODIPine (NORVASC) 5 MG tablet  90 tablet 1 4/10/2024 10/7/2024   [facility] [id number] [place] [address], [place] - [address] AT [place] [address]    Sig: Take 1 tablet (5 mg) by mouth daily for 180 days    Class: E-Prescribe    Route: Oral    ASPIRIN 81 PO  -- --  --       Sig: Take 81 mg by mouth daily    Class: Historical    Route: Oral    hydrALAZINE (APRESOLINE) 10 MG tablet  40 tablet 1 4/4/2024 --   [facility] [address] [place] [address], [place] [address] [place] [address]    Sig: Check blood pressure three times a day. Take one tab po tid prn systolic blood pressure over 160; hold if below that.    Class: E-Prescribe    levothyroxine (SYNTHROID/LEVOTHROID) 50 MCG tablet  90 tablet 3 7/28/2023 --   [facility] [address] [place] [address], [place] [address] [place] [address]    Sig: Take 1 tablet (50 mcg) by mouth daily    Class: E-Prescribe    Route: Oral    lisinopril (ZESTRIL) 20 MG tablet  60 tablet 11 4/17/2024 --   [facility] [place] [address], [place] - [address] AT [place] [address]    Sig: Take 1 tablet (20 mg) by mouth 2 times daily    Class: E-Prescribe    Route: Oral    Multiple Vitamin (MULTIVITAMIN) per tablet  -- --  --       Sig: Take 1 tablet by mouth daily.    Class: Historical    Route: Oral    omeprazole (PRILOSEC) 20 MG DR capsule  90 capsule 2 4/28/2024 --   [facility] [id number] [place] [address], [place] [address] [place] [address]    Sig: Take 1 capsule (20 mg) by mouth daily    Class: E-Prescribe    Route: Oral    travoprost CARMELA FREE (TRAVATAN Z) 0.004 % ophthalmic solution  2.5 mL 1 12/3/2020 --   [facility] #[id number] - [place], [place] - [address]  [facility] [address]    Sig: Place 1 drop into the right eye At Bedtime Call clinic to schedule follow up appointment. For additional refills, please schedule a follow-up appointment at [phone].  Past due for appt.    Class: E-Prescribe    Route: Right Eye           ALLERGIES:    No Known Allergies  REVIEW OF SYSTEMS:    A comprehensive review of systems was performed and found to be negative except as described here or above.  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    5 children one , 4 sons, 7 grandchildren 4 greatgrands    G5                 Social Determinants of Health     Financial Resource Strain: Not on file   Food Insecurity: Not on file   Transportation Needs: Not on file   Physical Activity: Not on file   Stress: Not on file   Social Connections: Unknown (3/4/2024)    [facility]    Social Connections     Frequency of Communication with Friends and Family: Not on file   Interpersonal Safety: Not on file   Housing Stability: Not on file     FAMILY MEDICAL HISTORY:   Family History   Problem Relation Age of Onset    Cancer Mother 78        stomach and  at 80    Hypertension Mother     Glaucoma Mother     Breast Cancer Sister 38        still living    Hypertension Father     Cerebrovascular Disease Father 52     Glaucoma Maternal Grandfather      PHYSICAL EXAM:   There were no vitals taken for this visit.  GENERAL APPEARANCE: alert and no distress  EYES: nonicteric  HENT: mouth without ulcers or lesions  NECK: supple, no adenopathy  RESP: lungs clear to auscultation   CV: regular rhythm, normal rate, no rub  ABDOMEN: soft, nontender, normal bowel sounds, no HSM   Extremities: no clubbing, cyanosis, or edema  MS: no evidence of inflammation in joints, no muscle tenderness  SKIN: no rash  NEURO: mentation intact and speech normal  PSYCH: affect normal/bright   LABS:   Recent Results (from the past 672 hour(s))   CBC with platelets    Collection Time: 05/06/24 12:39 PM   Result Value Ref Range    WBC Count 3.6 (L) 4.0 - 11.0 10e3/uL    RBC Count 4.43 3.80 - 5.20 10e6/uL    Hemoglobin 13.6 11.7 - 15.7 g/dL    Hematocrit 40.2 35.0 - 47.0 %    MCV 91 78 - 100 fL    MCH 30.7 26.5 - 33.0 pg    MCHC 33.8 31.5 - 36.5 g/dL    RDW 12.3 10.0 - 15.0 %    Platelet Count 214 150 - 450 10e3/uL     CMP  Recent Labs   Lab Test 04/04/24  1328 03/19/24  1021 03/14/24  1713 07/27/23  1110 07/18/22  1129 07/15/21  1243 07/15/21  1243 06/29/20  1219 07/02/19  1719 05/14/18  1247 05/10/17  1201    136 130* 140 135   < > 140 139 134 139  --    POTASSIUM 5.1 5.1 4.9 4.2 3.6   < > 4.3 4.1 4.0 4.6 4.9   CHLORIDE 99 98 94* 102 108   < > 106 106 101 106  --    CO2 26 30* 26 28 27   < > 29 29 26 27  --    ANIONGAP 11 8 10 10 <1*   < > 5 4 6 6  --    GLC 98 109* 96 99 96   < > 90 89 89 90  --    BUN 17.8 13.3 16.2 14.9 12   < > 19 18 12 14  --    CR 1.10* 1.01* 0.92 0.98* 0.88   < > 1.02 0.86 0.87 0.90 0.95   GFRESTIMATED 49* 54* 61 57* 65   < > 51* 63 62 60* 57*   GFRESTBLACK  --   --   --   --   --   --   --  73 72 73 69   ROSS 10.0 10.0 9.9 9.9 9.0   < > 9.5 9.1 9.3 9.2  --    PHOS 4.7*  --   --   --   --   --   --   --   --   --   --    PROTTOTAL  --   --  7.4 6.8 6.6*  --  7.3 7.0  --   --   --    ALBUMIN 4.3  --  4.5 4.4 3.4   < > 3.8 3.7  --    --   --    BILITOTAL  --   --  0.6 0.7 0.8  --  1.0 0.8  --   --   --    ALKPHOS  --   --  81 81 73  --  80 90  --   --   --    AST  --   --  23 23 20  --  19 18  --   --   --    ALT  --   --  18 17 22  --  23 22  --   --   --     < > = values in this interval not displayed.     CBC  Recent Labs   Lab Test 05/06/24  1239 04/04/24  1328 03/14/24  1713 07/18/22  1129   HGB 13.6 13.8 13.8 12.5   WBC 3.6* 3.3* 3.8* 3.4*   RBC 4.43 4.51 4.57 4.04   HCT 40.2 40.0 40.1 37.6   MCV 91 89 88 93   MCH 30.7 30.6 30.2 30.9   MCHC 33.8 34.5 34.4 33.2   RDW 12.3 12.1 11.9 12.0    208 263 211     INRNo lab results found.  ABGNo lab results found.   URINE STUDIES  Recent Labs   Lab Test 04/04/24  1347 03/14/24  1650 05/20/16  1205   COLOR Light Yellow Straw Straw   APPEARANCE Clear Clear Clear   URINEGLC Negative Negative Negative   URINEBILI Negative Negative Negative   URINEKETONE Negative Negative Negative   SG 1.016 1.004 1.002*   UBLD Negative Negative Negative   URINEPH 6.5 6.5 6.0   PROTEIN Negative Negative Negative   NITRITE Negative Negative Negative   LEUKEST Negative Negative Negative   RBCU <1  --  1   WBCU <1  --  <1     No lab results found.    ASSESSMENT AND PLAN:   #Hypertension  Review of the file shows that the patient had uncontrolled hypertension for many years but was not aware of it. While a component of renal artery stenosis is possible, there seems to be a lot of anxiety-driven high BP too. In addition, the patient has not been trying a regimen long enough to see if it works and I explained all this to her and her . Despite the fact that her renin aldosterone level is elevated, the aldosterone level is not high enough to consider that she could have hyperaldosteronism. I will changed her regimen to lisinopril 20 mg twice daily and amlodipine 5 mg daily and she is now borderline hypotensive, therefore I will decrease the lisinopril to 20 mg once daily.  The patient was also instructed to keep  the sodium intake around 2300 mg /day, follow and to avoid NSAIDs     #CKD  Mild and mainly due to uncontrolled BP and decline related to age. Proteinuria is well controlled on lisinopril    #CVD/dyslipidemia  Management per her PCP    #Blood count  Hemoglobin 13.8 normal    #Acid-base status  CO2 level 26  No need for sodium bicarbonate supplementation    #Electrolytes  Na 136  K 5.1-> 5.3 should improve with decreasing lisinopril to once daily  No acute issues    #BMD  Calcium  10         Phosphorus  4.7  Albumin 4.3  Vitamin D level 79 in July 2023 I stopped vitamin D supplementation    #CKD journey/transplant not a candidate at this point    The total time of this encounter amounted to 30 minutes on the day of the encounter. This time included time spent with the patient, reviewing records, ordering tests, and performing post visit documentation.     The patient will return to follow up in one year    Anabel Perez MD  Division of Renal Diseases and Hypertension

## 2024-05-06 NOTE — NURSING NOTE
Chief Complaint   Patient presents with    RECHECK     RETURN RESISTANT HYPERTENSION - return for resistant hypertension with labs prior per in basket message         Vitals:    05/06/24 1315 05/06/24 1321   BP: 120/76 131/78   BP Location: Left arm Left arm   Patient Position: Sitting Sitting   Cuff Size: Adult Regular Adult Regular   Pulse: 50    Temp: 97.8  F (36.6  C)    TempSrc: Oral    SpO2: 99%    Weight: 54.6 kg (120 lb 4.8 oz)          BP Readings from Last 3 Encounters:   05/06/24 131/78   04/10/24 125/73   04/04/24 (!) 144/85       /78 (BP Location: Left arm, Patient Position: Sitting, Cuff Size: Adult Regular)   Pulse 50   Temp 97.8  F (36.6  C) (Oral)   Wt 54.6 kg (120 lb 4.8 oz)   SpO2 99%   BMI 23.49 kg/m           Mello Joyce, [facility]

## 2024-07-29 ENCOUNTER — TELEPHONE (OUTPATIENT)
Dept: INTERNAL MEDICINE | Facility: CLINIC | Age: 84
End: 2024-07-29

## 2024-07-29 DIAGNOSIS — Z12.31 VISIT FOR SCREENING MAMMOGRAM: Primary | ICD-10-CM

## 2024-07-29 NOTE — TELEPHONE ENCOUNTER
Signed  Let her know that, and that at almost 84 she can continue them but it is also ok not to keep doing them

## 2024-07-29 NOTE — TELEPHONE ENCOUNTER
[facility] Call Center    Phone Message    May a detailed message be left on voicemail: yes     Reason for Call: Other: Per pt would like if the team can help order mammogram. Per pt would like to have that schedule after her annual physical in OCT. Please call pt back once that is order so she can schedule. Thank you!      Action Taken: Message routed to:  [facility] PCC    Travel Screening: Not Applicable     Date of Service:

## 2024-07-30 NOTE — TELEPHONE ENCOUNTER
Spoke to patient.  Mammogram ordered and per Dr Luciano, patient doesn't have to get mammogram anymore, unless patient want to continue. Patient opt out to continuing mammogram, so won't get a mammogram done this year.       John Emerson CMA ([facility]) at 9:43 AM on 7/30/2024

## 2024-08-15 ENCOUNTER — TELEPHONE (OUTPATIENT)
Dept: DERMATOLOGY | Facility: CLINIC | Age: 84
End: 2024-08-15
Payer: MEDICARE

## 2024-08-15 NOTE — TELEPHONE ENCOUNTER
"NEW LESION/SPOT OF CONCERN:     How long have you had the spot? Has had lesion for more than 2 year  Do you have a history of skin cancer? No  When was your last skin check? unsure  When is patients next appointment? Not scheduled        Do you have any of the below symptoms:  Is the lesion bleeding? NO  Crusting? YES, \"black crust\"  Tender to the touch? NO  Is the lesion rapidly changing or slowly been changing over time? Says getting bigger    Offered to schedule sooner with another provider, but patient declined. Scheduled next available with Dr. Prado.   "

## 2024-08-15 NOTE — TELEPHONE ENCOUNTER
[facility] Call Center    Phone Message    May a detailed message be left on voicemail: no     Reason for Call: Other: Pt, last saw Dr. Prado in 2019.  Can she make a return appt with her/ took something out on forehead before and it is still there.  Call [phone] / [place] to go [place] [facility]    Action Taken: Message routed to:  [facility] DERM/ Eve    Travel Screening: Not Applicable     Date of Service:

## 2024-09-06 ENCOUNTER — OFFICE VISIT (OUTPATIENT)
Dept: FAMILY MEDICINE | Facility: CLINIC | Age: 84
End: 2024-09-06
Payer: MEDICARE

## 2024-09-06 VITALS
WEIGHT: 122.5 LBS | BODY MASS INDEX: 23.92 KG/M2 | DIASTOLIC BLOOD PRESSURE: 85 MMHG | HEART RATE: 53 BPM | OXYGEN SATURATION: 99 % | SYSTOLIC BLOOD PRESSURE: 169 MMHG

## 2024-09-06 DIAGNOSIS — E03.8 OTHER SPECIFIED HYPOTHYROIDISM: Primary | ICD-10-CM

## 2024-09-06 DIAGNOSIS — Z23 NEED FOR TDAP VACCINATION: ICD-10-CM

## 2024-09-06 DIAGNOSIS — Z00.00 ENCOUNTER FOR MEDICARE ANNUAL WELLNESS EXAM: ICD-10-CM

## 2024-09-06 DIAGNOSIS — K21.9 GASTROESOPHAGEAL REFLUX DISEASE WITHOUT ESOPHAGITIS: ICD-10-CM

## 2024-09-06 DIAGNOSIS — Z29.11 NEED FOR VACCINATION AGAINST RESPIRATORY SYNCYTIAL VIRUS: ICD-10-CM

## 2024-09-06 DIAGNOSIS — Z00.00 HEALTH CARE MAINTENANCE: ICD-10-CM

## 2024-09-06 DIAGNOSIS — Z23 ENCOUNTER FOR IMMUNIZATION: ICD-10-CM

## 2024-09-06 DIAGNOSIS — E03.9 HYPOTHYROIDISM, UNSPECIFIED TYPE: ICD-10-CM

## 2024-09-06 PROCEDURE — 90471 IMMUNIZATION ADMIN: CPT | Performed by: FAMILY MEDICINE

## 2024-09-06 PROCEDURE — 99397 PER PM REEVAL EST PAT 65+ YR: CPT | Mod: 25 | Performed by: FAMILY MEDICINE

## 2024-09-06 PROCEDURE — 90662 IIV NO PRSV INCREASED AG IM: CPT | Performed by: FAMILY MEDICINE

## 2024-09-06 RX ORDER — ATORVASTATIN CALCIUM 20 MG/1
20 TABLET, FILM COATED ORAL DAILY
Qty: 90 TABLET | Refills: 3 | Status: SHIPPED | OUTPATIENT
Start: 2024-09-06

## 2024-09-06 RX ORDER — LEVOTHYROXINE SODIUM 50 UG/1
50 TABLET ORAL DAILY
Qty: 90 TABLET | Refills: 3 | Status: SHIPPED | OUTPATIENT
Start: 2024-09-06

## 2024-09-06 RX ORDER — GLUCOSAMINE/D3/BOSWELLIA SERRA 1500MG-400
TABLET ORAL
COMMUNITY

## 2024-09-06 NOTE — PROGRESS NOTES
Preventive Care Visit  [facility]  Khris Luciano MD, Family Medicine  Sep 6, 2024      Assessment & Plan     Need for Tdap vaccination  I suggest at drugstore    Need for vaccination against respiratory syncytial virus  Same    Hypothyroidism  Due  - levothyroxine (SYNTHROID/LEVOTHROID) 50 MCG tablet; Take 1 tablet (50 mcg) by mouth daily.    Gastroesophageal reflux disease without esophagitis  Helps gerd, tolerated  - omeprazole (PRILOSEC) 20 MG DR capsule; Take 1 capsule (20 mg) by mouth daily.  - atorvastatin (LIPITOR) 20 MG tablet; Take 1 tablet (20 mg) by mouth daily.    Hypothyroidism, unspecified type  Due  - levothyroxine (SYNTHROID/LEVOTHROID) 50 MCG tablet; Take 1 tablet (50 mcg) by mouth daily.    Health care maintenance    - INFLUENZA HIGH DOSE, TRIVALENT, PF (FLUZONE)    Encounter for immunization  She will do covid at drugstore  - INFLUENZA HIGH DOSE, TRIVALENT, PF (FLUZONE)          Counseling  Appropriate preventive services were addressed with this patient via screening, questionnaire, or discussion as appropriate for fall prevention, nutrition, physical activity, Tobacco-use cessation, social engagement, weight loss and cognition.  Checklist reviewing preventive services available has been given to the patient.  Reviewed patient's diet, addressing concerns and/or questions.           No follow-ups on file.    Cj Calles is a 84 year old, presenting for the following:  Medicare Visit, Physical, and Travel Clinic (Pt will be traveling to [facility] soon, wondering if she will need Covid vaccines or Paxlovid to take with her)        9/6/2024    12:36 PM   Additional Questions   Roomed by yadira greco           HPI  Here with   Going to [facility] soon, family trip  Ok with flu shot today will get covid shot soon  At home bp better she agrees to mychart me a list of home bp's        9/6/2024   General Health   How would you rate your overall  physical health? Good   Feel stress (tense, anxious, or unable to sleep) Not at all            9/6/2024   Nutrition   Diet: Regular (no restrictions)            9/6/2024   Exercise   Days per week of moderate/strenous exercise 5 days            9/6/2024   Social Factors   Frequency of gathering with friends or relatives Twice a week   Worry food won't last until get money to buy more No   Food not last or not have enough money for food? No   Do you have housing? (Housing is defined as stable permanent housing and does not include staying ouside in a car, in a tent, in an abandoned building, in an overnight shelter, or couch-surfing.) Yes   Are you worried about losing your housing? No   Lack of transportation? No   Unable to get utilities (heat,electricity)? No            9/6/2024   Fall Risk   Fallen 2 or more times in the past year? No    No   Trouble with walking or balance? No    No       Multiple values from one day are sorted in reverse-chronological order          9/6/2024   Activities of Daily Living- Home Safety   Needs help with the following daily activites None of the above   Safety concerns in the home None of the above            9/6/2024   Dental   Dentist two times every year? Yes            9/6/2024   Hearing Screening   Hearing concerns? None of the above            9/6/2024   Driving Risk Screening   Patient/family members have concerns about driving No            9/6/2024   General Alertness/Fatigue Screening   Have you been more tired than usual lately? No            9/6/2024   Urinary Incontinence Screening   Bothered by leaking urine in past 6 months No            9/6/2024   TB Screening   Were you born outside of the [place]? Yes            Today's PHQ-2 Score:       9/6/2024    12:35 PM   PHQ-2 ( 1999 Pfizer)   Q1: Little interest or pleasure in doing things 0   Q2: Feeling down, depressed or hopeless 0   PHQ-2 Score 0   Q1: Little interest or pleasure in doing things Not at all   Q2: Feeling  down, depressed or hopeless Not at all   PHQ-2 Score 0           9/6/2024   Substance Use   Alcohol more than 3/day or more than 7/wk No   Do you have a current opioid prescription? No   How severe/bad is pain from 1 to 10? 0/10 (No Pain)   Do you use any other substances recreationally? No        Social History     Tobacco Use    Smoking status: Never    Smokeless tobacco: Never   Substance Use Topics    Alcohol use: Yes     Alcohol/week: 0.0 standard drinks of alcohol     Comment: rare tequilla    Drug use: No           7/27/2023   LAST FHS-7 RESULTS   1st degree relative breast or ovarian cancer No   Any relative bilateral breast cancer Unknown   Any male have breast cancer No   Any ONE woman have BOTH breast AND ovarian cancer No   Any woman with breast cancer before 50yrs Unknown   2 or more relatives with breast AND/OR ovarian cancer No   2 or more relatives with breast AND/OR bowel cancer No          Reviewed and updated as needed this visit by Provider                      Current providers sharing in care for this patient include:  Patient Care Team:  Khris Luciano MD as PCP - General (Family Medicine)  Karma Bustillos MD as MD (Ophthalmology)  Karina Barros MD as MD (Internal Medicine)  Cami Hearn MD as MD (OB/Gyn)  Adelina Prado MD as MD (Dermatology)  Negro Williamson MD as MD (Orthopedics)  Elza Hernandez MD as Assigned Neuroscience Provider  Noemi Collins MD as Assigned PCP  Anabel Perez MD as Assigned Nephrology Provider  KELSI Hernández MD as MD (Plastic Surgery)    The following health maintenance items are reviewed in Epic and correct as of today:  Health Maintenance   Topic Date Due    RSV VACCINE (1 - 1-dose 60+ series) Never done    ADVANCE CARE PLANNING  09/06/2022    DTAP/TDAP/TD IMMUNIZATION (3 - Td or Tdap) 03/13/2023    MEDICARE ANNUAL WELLNESS VISIT  07/27/2024    TSH W/FREE T4 REFLEX  07/27/2024    INFLUENZA VACCINE  (1) 09/01/2024    MAMMO SCREENING  07/27/2024    ANNUAL REVIEW OF HM ORDERS  02/28/2025    FALL RISK ASSESSMENT  09/06/2025    DEXA  06/06/2038    PHQ-2 (once per calendar year)  Completed    Pneumococcal Vaccine: 65+ Years  Completed    ZOSTER IMMUNIZATION  Completed    COVID-19 Vaccine  Completed    HPV IMMUNIZATION  Aged Out    MENINGITIS IMMUNIZATION  Aged Out    RSV MONOCLONAL ANTIBODY  Aged Out    LIPID  Discontinued            Objective    Exam  BP (!) 169/85 (BP Location: Right arm, Patient Position: Sitting, Cuff Size: Adult Regular)   Pulse 53   Wt 55.6 kg (122 lb 8 oz)   SpO2 99%   BMI 23.92 kg/m     Estimated body mass index is 23.92 kg/m  as calculated from the following:    Height as of 3/19/24: 1.524 m (5').    Weight as of this encounter: 55.6 kg (122 lb 8 oz).    Physical Exam  GENERAL: alert and no distress  NECK: no adenopathy, no asymmetry, masses, or scars  RESP: lungs clear to auscultation - no rales, rhonchi or wheezes  CV: regular rate and rhythm, normal S1 S2, no S3 or S4, no murmur, click or rub, no peripheral edema  ABDOMEN: soft, nontender, no hepatosplenomegaly, no masses and bowel sounds normal  MS: no gross musculoskeletal defects noted, no edema  Normal minicog today    Signed Electronically by: Khris Luciano MD

## 2024-09-06 NOTE — PATIENT INSTRUCTIONS
Patient Education   Preventive Care Advice   This is general advice given by our system to help you stay healthy. However, your care team may have specific advice just for you. Please talk to your care team about your preventive care needs.  Nutrition  Eat 5 or more servings of fruits and vegetables each day.  Try wheat bread, brown rice and whole grain pasta (instead of white bread, rice, and pasta).  Get enough calcium and vitamin D. Check the label on foods and aim for 100% of the RDA (recommended daily allowance).  Lifestyle  Exercise at least 150 minutes each week  (30 minutes a day, 5 days a week).  Do muscle strengthening activities 2 days a week. These help control your weight and prevent disease.  No smoking.  Wear sunscreen to prevent skin cancer.  Have a dental exam and cleaning every 6 months.  Yearly exams  See your health care team every year to talk about:  Any changes in your health.  Any medicines your care team has prescribed.  Preventive care, family planning, and ways to prevent chronic diseases.  Shots (vaccines)   HPV shots (up to age 26), if you've never had them before.  Hepatitis B shots (up to age 59), if you've never had them before.  COVID-19 shot: Get this shot when it's due.  Flu shot: Get a flu shot every year.  Tetanus shot: Get a tetanus shot every 10 years.  Pneumococcal, hepatitis A, and RSV shots: Ask your care team if you need these based on your risk.  Shingles shot (for age 50 and up)  General health tests  Diabetes screening:  Starting at age 35, Get screened for diabetes at least every 3 years.  If you are younger than age 35, ask your care team if you should be screened for diabetes.  Cholesterol test: At age 39, start having a cholesterol test every 5 years, or more often if advised.  Bone density scan (DEXA): At age 50, ask your care team if you should have this scan for osteoporosis (brittle bones).  Hepatitis C: Get tested at least once in your life.  STIs (sexually  transmitted infections)  Before age 24: Ask your care team if you should be screened for STIs.  After age 24: Get screened for STIs if you're at risk. You are at risk for STIs (including HIV) if:  You are sexually active with more than one person.  You don't use condoms every time.  You or a partner was diagnosed with a sexually transmitted infection.  If you are at risk for HIV, ask about PrEP medicine to prevent HIV.  Get tested for HIV at least once in your life, whether you are at risk for HIV or not.  Cancer screening tests  Cervical cancer screening: If you have a cervix, begin getting regular cervical cancer screening tests starting at age 21.  Breast cancer scan (mammogram): If you've ever had breasts, begin having regular mammograms starting at age 40. This is a scan to check for breast cancer.  Colon cancer screening: It is important to start screening for colon cancer at age 45.  Have a colonoscopy test every 10 years (or more often if you're at risk) Or, ask your provider about stool tests like a FIT test every year or Cologuard test every 3 years.  To learn more about your testing options, visit:   .  For help making a decision, visit:   https://bit.ly/kp04735.  Prostate cancer screening test: If you have a prostate, ask your care team if a prostate cancer screening test (PSA) at age 55 is right for you.  Lung cancer screening: If you are a current or former smoker ages 50 to 80, ask your care team if ongoing lung cancer screenings are right for you.  For informational purposes only. Not to replace the advice of your health care provider. Copyright   2023 [employer]. All rights reserved. Clinically reviewed by the [facility] Transitions Program. [employer] [id number] - REV 01/24.

## 2024-11-02 ENCOUNTER — HEALTH MAINTENANCE LETTER (OUTPATIENT)
Age: 84
End: 2024-11-02

## 2024-11-06 ENCOUNTER — APPOINTMENT (OUTPATIENT)
Dept: URBAN - METROPOLITAN AREA CLINIC 259 | Age: 84
Setting detail: DERMATOLOGY
End: 2024-11-13

## 2024-11-06 VITALS — WEIGHT: 128 LBS | HEIGHT: 60 IN

## 2024-11-06 DIAGNOSIS — L82.0 INFLAMED SEBORRHEIC KERATOSIS: ICD-10-CM

## 2024-11-06 PROCEDURE — OTHER COUNSELING: OTHER

## 2024-11-06 PROCEDURE — 17110 DESTRUCT B9 LESION 1-14: CPT

## 2024-11-06 PROCEDURE — OTHER MIPS QUALITY: OTHER

## 2024-11-06 PROCEDURE — OTHER LIQUID NITROGEN: OTHER

## 2024-11-06 ASSESSMENT — LOCATION SIMPLE DESCRIPTION DERM: LOCATION SIMPLE: LEFT FOREHEAD

## 2024-11-06 ASSESSMENT — LOCATION DETAILED DESCRIPTION DERM
LOCATION DETAILED: LEFT SUPERIOR LATERAL FOREHEAD
LOCATION DETAILED: LEFT SUPERIOR FOREHEAD

## 2024-11-06 ASSESSMENT — LOCATION ZONE DERM: LOCATION ZONE: FACE

## 2024-11-06 NOTE — HPI: SKIN LESION
What Type Of Note Output Would You Prefer (Optional)?: Standard Output
Is This A New Presentation, Or A Follow-Up?: Skin Lesion
Additional History: The patient reports she has a brown, raised lesion on the left forehead in the hairline which was previously treated with LN2 and it went away, but it came back 2 years ago. She reports the lesion has gotten bigger and it catches when she crockett her hair. She reports there are 2 smaller lesions starting in the hairline as well.

## 2024-11-06 NOTE — TELEPHONE ENCOUNTER
levothyroxine (SYNTHROID, LEVOTHROID) 50 MCG tablet   Last Written Prescription Date:  7/19/16  Last Fill Quantity: 90,   # refills: 3  Last Office Visit with G, P or [facility] prescribing provider: 5/10/17  Future Office visit:   None    TSH   Date Value Ref Range Status   05/10/2017 0.95 0.40 - 4.00 mU/L Final   ]       see mdm

## 2024-11-06 NOTE — PROCEDURE: LIQUID NITROGEN
Medical Necessity Clause: This procedure was medically necessary because the lesions that were treated were:
Show Aperture Variable?: Yes
Application Tool (Optional): Liquid Nitrogen Sprayer
Include Z78.9 (Other Specified Conditions Influencing Health Status) As An Associated Diagnosis?: No
Detail Level: Detailed
Spray Paint Text: The liquid nitrogen was applied to the skin utilizing a spray paint frosting technique.
Post-Care Instructions: I reviewed with the patient in detail post-care instructions. Patient is to wear sunprotection, and avoid picking at any of the treated lesions. Pt may apply Vaseline to crusted or scabbing areas.
Medical Necessity Information: It is in your best interest to select a reason for this procedure from the list below. All of these items fulfill various CMS LCD requirements except the new and changing color options.
Consent: The patient's consent was obtained including but not limited to risks of crusting, scabbing, blistering, scarring, darker or lighter pigmentary change, recurrence, incomplete removal and infection.

## 2024-12-18 ENCOUNTER — PATIENT OUTREACH (OUTPATIENT)
Dept: CARE COORDINATION | Facility: CLINIC | Age: 84
End: 2024-12-18
Payer: MEDICARE

## 2025-01-14 ENCOUNTER — TELEPHONE (OUTPATIENT)
Dept: ORTHOPEDICS | Facility: CLINIC | Age: 85
End: 2025-01-14
Payer: MEDICARE

## 2025-01-14 NOTE — TELEPHONE ENCOUNTER
Left Voicemail (1st Attempt) and Sent Mychart (1st Attempt) for the patient to call back and schedule the following:    Appointment type: New Knee    Provider: Gio Robles Smith, D.   Return date: next available  Specialty phone number: [phone]  Additonal Notes: IF PATIENT HAS HAD A TOTAL KNEE REPLACEMENT, PLEASE SEND BACK TO CLINIC (OR [facility]) TO REVIEW AND HELP FIND THE CORRECT PROVIDER TO SCHEDULE

## 2025-01-15 ENCOUNTER — VIRTUAL VISIT (OUTPATIENT)
Dept: INTERNAL MEDICINE | Facility: CLINIC | Age: 85
End: 2025-01-15
Payer: COMMERCIAL

## 2025-01-15 DIAGNOSIS — J06.9 VIRAL URI WITH COUGH: Primary | ICD-10-CM

## 2025-01-15 NOTE — TELEPHONE ENCOUNTER
REASON FOR VISIT: bilat knee pain, swelling    DATE OF APPT: 1/24/2025   NOTES (FOR ALL VISITS) STATUS DETAILS   OFFICE NOTE from referring provider N/A Self   EMG N/A    MEDICATION LIST N/A    IMAGING  (FOR ALL VISITS)     XR N/A    MRI (HEAD, NECK, SPINE) N/A    CT (HEAD, NECK, SPINE) N/A         O-Z Plasty Text: The defect edges were debeveled with a #15 scalpel blade.  Given the location of the defect, shape of the defect and the proximity to free margins an O-Z plasty (double transposition flap) was deemed most appropriate.  Using a sterile surgical marker, the appropriate transposition flaps were drawn incorporating the defect and placing the expected incisions within the relaxed skin tension lines where possible.    The area thus outlined was incised deep to adipose tissue with a #15 scalpel blade.  The skin margins were undermined to an appropriate distance in all directions utilizing iris scissors.  Hemostasis was achieved with electrocautery.  The flaps were then transposed into place, one clockwise and the other counterclockwise, and anchored with interrupted buried subcutaneous sutures.

## 2025-01-15 NOTE — PATIENT INSTRUCTIONS
Thank you for visiting the Primary Care Center today at the [facility]! The following is some information about our clinic:     Primary Care Center Frequently-Asked Questions    (1) How do I schedule appointments at the [facility]?     Primary Care--to schedule or make changes to an existing appointment, please call our primary care line at [phone].    Labs--to schedule a lab appointment at the [facility] you can use [employer] or call [phone]. If you have a [place] location that is closer to home, you can reach out to that location for scheduling options.     Imaging--if you need to schedule a CT, X-ray, MRI, ultrasound, or other imaging study you can call [phone] to schedule at the [facility] or any other [facility] imaging location.     Referrals--if a referral to another specialty was ordered you can expect a phone call from their scheduling team. If you have not heard from them in a week, please call us or send us a [employer] message to check the status or get a scheduling number. Please note that this only applies to internal [facility] referrals. If the referral is external you would need to contact their office for scheduling.     (2) I have a question about my visit, who do I contact?     You can call us at the primary care line at [phone] to ask questions about your visit. You can also send a secure message through [employer], which is reviewed by clinic staff. Please note that [employer] messages have a twenty-four to forty-eight business hour turnaround time and should not be used for urgent concerns.    (3) How will I get the results of my tests?    If you are signed up for [employer] all tests will be released to you within twenty-four hours of resulting. Please allow three to five days for your doctor to review your results and place a note interpreting the results. If you do not have [employer] you will receive your  results through mail seven to ten business days following the return of the tests. Please note that if there should be any urgent or concerning results that your doctor or their registered nurse will reach out to you the same day as the tests come back. If you have follow up questions about your results or would like to discuss the results in detail please schedule a follow up with your provider either in person or virtually.     (4) How do I get refills of my prescriptions?     You should always first contact your pharmacy for refills of your medications. If submitting a refill request on [employer], please be sure to submit the request only once--repeat requests can cause delays in refill. If you are requesting a NEW medication or a medication related to new symptoms you will need to schedule an appointment with a provider prior to approval. Please note: Routine medication refills have up to one to three business day turnaround whereas controlled substances refills have up to five to seven business day turnaround.    (5) I have new symptoms, what do I do?     If you are having an immediate medical emergency, you should dial 911 for assistance.   For anything urgent that needs to be seen within a few hours to one day you should visit a local urgent care for assistance.  For non-urgent symptoms that need to be seen within a few days to a week you can schedule with an available provider in primary care by going to [employer] or calling [phone].   If you are not sure how serious your symptoms are or you would like to receive medical advice you can always call [phone] to speak with a triage nurse.

## 2025-01-15 NOTE — PROGRESS NOTES
Marli is a 84 year old who is being evaluated via a billable video visit.    How would you like to obtain your AVS? MyChart  If the video visit is dropped, the invitation should be resent by: Send to e-mail at: [email or web address]  Will anyone else be joining your video visit? No      Are refills needed on medications prescribed by this physician? NO    Soo Tamayo VVF      Assessment & Plan     Viral URI with cough  I recommended over-the-counter treatment including acetaminophen at 1000 mg every 8 hours and Delsym (dextromethorphan) 60 mg twice a day.  I also advised that she remain well-hydrated and try to eat the best she can.  I did offer antinausea medications but she declined.  She states she is able to keep food down if she eats in small amounts.  I did warn her that if she seems like she is getting better and then suddenly gets worse she needs to let us know since worsening viral infection causing respiratory compromise or even a bacterial infection would warrant further evaluation and treatment.          No follow-ups on file.    Subjective   Marli is a 84 year old, presenting for the following health issues:  RECHECK and Norovirus      Video Start Time:  3:04p    History of Present Illness       Reason for visit:  Norovirus  Symptom onset:  1-2 weeks ago  Symptoms include:  Nausea, chills, headache, coughing  Symptom intensity:  Moderate  Symptom progression:  Improving  Had these symptoms before:  No  What makes it worse:  No  What makes it better:  No   She is taking medications regularly.     She started the visit by stating she has norovirus.     She then went on to describe her symptoms as follows:  Nausea, chills and cold all of the time. She has muscle pain. Cough. Headaches. She had diarrhea and cramps but not anymore. The muscle pain is the worst along with the cough. No fever per her thermometer. Started 8 days ago. It is a little better now.     I explained to her that norovirus is mainly  "a GI infection and this more like a viral infection mostly affecting the upper respiratory system.  In the absence of diarrhea I do not think this is norovirus.  Regardless the treatment is similar and that they target symptomatic treatment and ensuring people are safe.    She has taken something for the headache and muscle pain -Coricidin. She has high blood pressure and last time she took medications for cold she did have an elevation of her blood pressure.  She cannot remember exactly what she took but he is cautious and wants my advice on what is safe to take.                Objective    Vitals - Patient Reported  Systolic (Patient Reported): 121  Diastolic (Patient Reported): 75  Weight (Patient Reported): 56.7 kg (125 lb)  Height (Patient Reported): 154.9 cm (5' 1\")  BMI (Based on Pt Reported Ht/Wt): 23.62  Pain Score: Extreme Pain (8)  Pain Loc: Other - see comment (body aches)        Physical Exam   GENERAL: alert and no distress however she does look worn out and tired.  There is no increased work of breathing or shortness of breath.  She is able to talk in full paragraphs.  RESP: No audible wheeze, cough, or visible cyanosis.    PSYCH: Appropriate affect, tone, and pace of words          Video-Visit Details    Type of service:  Video Visit with audio via a cell phone  Video End Time: 3:14pm  Originating Location (pt. Location): Home    Distant Location (provider location):  Off-site  Platform used for Video Visit: [employer]  Signed Electronically by: Amrit Pizarro MD    "

## 2025-01-24 ENCOUNTER — PRE VISIT (OUTPATIENT)
Dept: ORTHOPEDICS | Facility: CLINIC | Age: 85
End: 2025-01-24

## 2025-03-18 NOTE — PROGRESS NOTES
[facility] Initial Evaluation -- Lower Extremity    Evaluation Date: September 28, 2017  Marli Ponce is a 77 year old female with a (R) hip condition.   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    HISTORY:    Present symptoms: (R) posterior/lateral hip and thigh  Pain quality (sharp/shooting/stabbing/aching/burning/cramping):  achy    Present since (onset date): July 2017    Symptoms (improving/unchaning/worsening):  worsening.      Symptoms commenced as a result of: Walking up hill   Condition occurred in the following environment: During recreation activity     Symptoms at onset: As above  Paresthesia (yes/no):  No  Spinal history: No   Cough/Sneeze (pos/neg):  Neg    Constant symptoms: None  Intermittent symptoms: As above    Symptoms are worse with the following: Always Rising, Always First few steps, Always Walking, Always Stairs (Up) and Always Sleeping (prone/sup/side R/L) - Right   Symptoms are better with the following: Other - ibuprofen    Continued use makes the pain (better/worse/no effect): Worse    Disturbed night (yes/no): Yes, sometimes      Pain at rest (yes/no):  No  Site (back/hip/knee/ankle/foot):  NA    Other questions (swelling/clicking/locking/giving way/falling):  No     Previous episodes: Yes  Previous treatments: PT-hip strengthening - Helped.  Resumed again recently - NE, sometimes more painful and lingering after performing.    Specific Questions:  General health (excellent/good/fair/poor):  Good  Pertinent medical history includes: High blood pressure and Thyroid problems  Medications (nil/NSAIDS/analg/steroids/anticoag/other):  NSAIDS and Other - Thyroid and High blood pressure  Medical allergies:  None  Imaging  Spoke with pt.  Pt voiced understanding.  Order placed.   (none/Xray/MRI/other):  X-ray  Recent or major surgery (yes/no):  No  Night pain (yes/no):  No  Accidents (yes/no):  No  Unexplained weight loss (yes/no):  No  Barriers at home: No  Other red flags: No    Sites for physical examination (back/hip/knee/ankle/foot/other): Back/Hip    EXAMINATION    Posture:  Sitting (good/fair/poor): Fair    Correction of Posture (better/worse/no effect/NA): No effect  Standing (good/fair/poor): Fair  Other observations:  NT    Neurological: (NA/motor/sensory/reflexes/dural): NT    Baselines (pain or functional activity): Painful walking, ascending stairs    Extremities (Hip / Knee / Ankle / Foot): Hip    Movement Loss Michael Mod Min Nil Pain   Flexion   X  ERP   Extension   X     Abduction        Adduction        Internal Rotation   X  ERP   External Rotation   X  ERP     Passive Movement (+/- over pressure)/(PDM/ERP):  NT  Resisted Test Response (pain): (R) Hip Flex 4/5, Painful; Abd 4/5, painful; Quad 5/5; H-S 5/5  Other Tests: NT    Spine:  Movement loss: Flex Min loss/ERP; Ext Mod loss; SG (R) Mod loss (L) Min loss  Effect of repeated movements: EIL - Produce (R) low back/glut, improves with reps, NW, Increased (R) hip ROM all planes with less pain.  Improved hip flex and abd strength.  Effect of static positioning: NT  Spine testing (not relevant/relevant/secondary problem): Relevant    Baseline Symptoms: NA  Repeated Tests Symptom Response Mechanical Response   Active/Passive movement, resisted test, functional test During -  Produce, Abolish, Increase, Decrease, NE After -  Better, Worse, NB, NW, NE Effect -   ? or ? ROM, strength or key functional test No   Effect   NT due to response with repeated L/S movements       Effect of static positioning       NT         Provisional Classification (Extremity/Spine):  Spine - Derangement - Asymmetrical, unilateral, symptoms above knee      Princicple of Management:   Education:  Posture, Spine vs hip source of pain, specificity of  exercise    Equipment provided:  Lumbar roll  Exercise and dosage:  Repeated L/S EIL x 10-15 reps, 5-6 x daily    ASSESSMENT/PLAN:    Patient is a 77 year old female with right side hip complaints.    Patient has the following significant findings with corresponding treatment plan.                Diagnosis 1:  (R) Hip Pain with lumbar component    Pain -  self management, education, directional preference exercise and home program  Decreased ROM/flexibility - manual therapy, therapeutic exercise and home program  Decreased joint mobility - manual therapy, therapeutic exercise and home program  Decreased strength - therapeutic exercise, therapeutic activities and home program  Impaired gait - gait training and home program  Impaired muscle performance - neuro re-education and home program  Decreased function - therapeutic activities and home program  Impaired posture - neuro re-education and home program    Therapy Evaluation Codes:   1) History comprised of:   Personal factors that impact the plan of care:      Language.    Comorbidity factors that impact the plan of care are:      High blood pressure.     Medications impacting care: Anti-inflammatory and High blood pressure.  2) Examination of Body Systems comprised of:   Body structures and functions that impact the plan of care:      Hip and Lumbar spine.   Activity limitations that impact the plan of care are:      Squatting/kneeling, Stairs, Walking, Laying down and rising.  3) Clinical presentation characteristics are:   Stable/Uncomplicated.  4) Decision-Making    Low complexity using standardized patient assessment instrument and/or measureable assessment of functional outcome.  Cumulative Therapy Evaluation is: Low complexity.    Previous and current functional limitations:  (See Goal Flow Sheet for this information)    Short term and Long term goals: (See Goal Flow Sheet for this information)     Communication ability:  Patient appears to be able to  clearly communicate and understand verbal and written communication and follow directions correctly.  Treatment Explanation - The following has been discussed with the patient:   RX ordered/plan of care  Anticipated outcomes  Possible risks and side effects  This patient would benefit from PT intervention to resume normal activities.   Rehab potential is good.    Frequency:  1 X week, once daily  Duration:  for 6 weeks  Discharge Plan:  Achieve all LTG.  Independent in home treatment program.  Reach maximal therapeutic benefit.    Please refer to the daily flowsheet for treatment today, total treatment time and time spent performing 1:1 timed codes.

## 2025-04-17 DIAGNOSIS — I10 ESSENTIAL HYPERTENSION, BENIGN: ICD-10-CM

## 2025-04-17 RX ORDER — AMLODIPINE BESYLATE 5 MG/1
5 TABLET ORAL DAILY
Qty: 90 TABLET | Refills: 3 | Status: SHIPPED | OUTPATIENT
Start: 2025-04-17

## 2025-04-17 NOTE — TELEPHONE ENCOUNTER
Received refill request for Amlodipine Besylate 5 MG tablets from [facility] [place], [place].  Madelin Hernández,  Nephrology Clinic Navigator  [facility]  Direct: [phone].

## 2025-04-17 NOTE — TELEPHONE ENCOUNTER
Nephrology Note: Medication Refill Request    Medication Refill Request:     Medication/Dose/Frequency: amlodipine  Preferred Pharmacy: [facility]  Provider:       Dr. Perez                    SITUATION/BACKROUND:                 Last office visit: May 2024        Future office visit: May 2025     ASSESSMENT:     Neph Assessments:    Recent Labs:  CBC Results:  Recent Labs   Lab Test 05/06/24  1239   WBC 3.6*   RBC 4.43   HGB 13.6   HCT 40.2   MCV 91   MCH 30.7   MCHC 33.8   RDW 12.3        Last Renal Panel:  Sodium   Date Value Ref Range Status   05/06/2024 138 135 - 145 mmol/L Final     Comment:     Reference intervals for this test were updated on 09/26/2023 to more accurately reflect our healthy population. There may be differences in the flagging of prior results with similar values performed with this method. Interpretation of those prior results can be made in the context of the updated reference intervals.    06/29/2020 139 133 - 144 mmol/L Final     Potassium   Date Value Ref Range Status   05/06/2024 5.3 3.4 - 5.3 mmol/L Final   07/18/2022 3.6 3.4 - 5.3 mmol/L Final   06/29/2020 4.1 3.4 - 5.3 mmol/L Final     Chloride   Date Value Ref Range Status   05/06/2024 103 98 - 107 mmol/L Final   07/18/2022 108 94 - 109 mmol/L Final   06/29/2020 106 94 - 109 mmol/L Final     Carbon Dioxide   Date Value Ref Range Status   06/29/2020 29 20 - 32 mmol/L Final     Carbon Dioxide (CO2)   Date Value Ref Range Status   05/06/2024 26 22 - 29 mmol/L Final   07/18/2022 27 20 - 32 mmol/L Final     Anion Gap   Date Value Ref Range Status   05/06/2024 9 7 - 15 mmol/L Final   07/18/2022 <1 (L) 3 - 14 mmol/L Final   06/29/2020 4 3 - 14 mmol/L Final     Glucose   Date Value Ref Range Status   05/06/2024 100 (H) 70 - 99 mg/dL Final   07/18/2022 96 70 - 99 mg/dL Final   06/29/2020 89 70 - 99 mg/dL Final     Urea Nitrogen   Date Value Ref Range Status   05/06/2024 19.8 8.0 - 23.0 mg/dL Final   07/18/2022 12 7 - 30  mg/dL Final   06/29/2020 18 7 - 30 mg/dL Final     Creatinine   Date Value Ref Range Status   05/06/2024 1.12 (H) 0.51 - 0.95 mg/dL Final   06/29/2020 0.86 0.52 - 1.04 mg/dL Final     GFR Estimate   Date Value Ref Range Status   05/06/2024 48 (L) >60 mL/min/1.73m2 Final   06/29/2020 63 >60 mL/min/[1.73_m2] Final     Comment:     Non  GFR Calc  Starting 12/18/2018, serum creatinine based estimated GFR (eGFR) will be   calculated using the Chronic Kidney Disease Epidemiology Collaboration   (CKD-EPI) equation.       Calcium   Date Value Ref Range Status   05/06/2024 9.8 8.8 - 10.2 mg/dL Final   06/29/2020 9.1 8.5 - 10.1 mg/dL Final     Phosphorus   Date Value Ref Range Status   04/04/2024 4.7 (H) 2.5 - 4.5 mg/dL Final     Albumin   Date Value Ref Range Status   05/06/2024 4.1 3.5 - 5.2 g/dL Final   07/18/2022 3.4 3.4 - 5.0 g/dL Final   06/29/2020 3.7 3.4 - 5.0 g/dL Final       Current Medication List:   Current Outpatient Medications (Antihypertensive, Cardiovascular, Diuretics, Beta blockers, Calcium blockers, Anticoagulants)   Medication Sig    amLODIPine (NORVASC) 5 MG tablet Take 1 tablet (5 mg) by mouth daily.    lisinopril (ZESTRIL) 20 MG tablet Take 1 tablet (20 mg) by mouth daily     Current Outpatient Medications (Other)   Medication Sig    ASPIRIN 81 PO Take 81 mg by mouth daily    atorvastatin (LIPITOR) 20 MG tablet Take 1 tablet (20 mg) by mouth daily.    Biotin 87389 MCG TABS Take by mouth.    brimonidine-timolol (COMBIGAN) 0.2-0.5 % ophthalmic solution Place 1 drop into both eyes 2 times daily    levothyroxine (SYNTHROID/LEVOTHROID) 50 MCG tablet Take 1 tablet (50 mcg) by mouth daily.    Multiple Vitamin (MULTIVITAMIN) per tablet Take 1 tablet by mouth daily.    omeprazole (PRILOSEC) 20 MG DR capsule Take 1 capsule (20 mg) by mouth daily.    travoprost CARMELA FREE (TRAVATAN Z) 0.004 % ophthalmic solution Place 1 drop into the right eye At Bedtime Call clinic to schedule follow up  appointment. For additional refills, please schedule a follow-up appointment at [phone].  Past due for appt.    Vitamin D3 (VITAMIN D-1000 MAX ST) 25 mcg (1000 units) tablet Take 1,000 Units by mouth daily (Patient not taking: Reported on 9/6/2024)       Has patient had kidney transplant in the prior 1 year: no.   Has patient been seen the last 12 months: Yes.  Associated labs reviewed for medication: N/A    PLAN:     Medication refilled per protocol: Yes    LUCIE VELEZ RN

## 2025-04-30 DIAGNOSIS — I10 ESSENTIAL HYPERTENSION, BENIGN: ICD-10-CM

## 2025-04-30 RX ORDER — LISINOPRIL 20 MG/1
20 TABLET ORAL DAILY
Qty: 90 TABLET | Refills: 3 | Status: SHIPPED | OUTPATIENT
Start: 2025-04-30

## 2025-04-30 NOTE — TELEPHONE ENCOUNTER
Nephrology Note: Medication Refill Request    Medication Refill Request:     Medication/Dose/Frequency: Lisinopril  Preferred Pharmacy: [facility]  Provider:                           SITUATION/BACKROUND:                 Last office visit: May 2024        Future office visit: May 2025     ASSESSMENT:     Neph Assessments:    Recent Labs:  CBC Results:  Recent Labs   Lab Test 05/06/24  1239   WBC 3.6*   RBC 4.43   HGB 13.6   HCT 40.2   MCV 91   MCH 30.7   MCHC 33.8   RDW 12.3        Last Renal Panel:  Sodium   Date Value Ref Range Status   05/06/2024 138 135 - 145 mmol/L Final     Comment:     Reference intervals for this test were updated on 09/26/2023 to more accurately reflect our healthy population. There may be differences in the flagging of prior results with similar values performed with this method. Interpretation of those prior results can be made in the context of the updated reference intervals.    06/29/2020 139 133 - 144 mmol/L Final     Potassium   Date Value Ref Range Status   05/06/2024 5.3 3.4 - 5.3 mmol/L Final   07/18/2022 3.6 3.4 - 5.3 mmol/L Final   06/29/2020 4.1 3.4 - 5.3 mmol/L Final     Chloride   Date Value Ref Range Status   05/06/2024 103 98 - 107 mmol/L Final   07/18/2022 108 94 - 109 mmol/L Final   06/29/2020 106 94 - 109 mmol/L Final     Carbon Dioxide   Date Value Ref Range Status   06/29/2020 29 20 - 32 mmol/L Final     Carbon Dioxide (CO2)   Date Value Ref Range Status   05/06/2024 26 22 - 29 mmol/L Final   07/18/2022 27 20 - 32 mmol/L Final     Anion Gap   Date Value Ref Range Status   05/06/2024 9 7 - 15 mmol/L Final   07/18/2022 <1 (L) 3 - 14 mmol/L Final   06/29/2020 4 3 - 14 mmol/L Final     Glucose   Date Value Ref Range Status   05/06/2024 100 (H) 70 - 99 mg/dL Final   07/18/2022 96 70 - 99 mg/dL Final   06/29/2020 89 70 - 99 mg/dL Final     Urea Nitrogen   Date Value Ref Range Status   05/06/2024 19.8 8.0 - 23.0 mg/dL Final   07/18/2022 12 7 - 30 mg/dL  Final   06/29/2020 18 7 - 30 mg/dL Final     Creatinine   Date Value Ref Range Status   05/06/2024 1.12 (H) 0.51 - 0.95 mg/dL Final   06/29/2020 0.86 0.52 - 1.04 mg/dL Final     GFR Estimate   Date Value Ref Range Status   05/06/2024 48 (L) >60 mL/min/1.73m2 Final   06/29/2020 63 >60 mL/min/[1.73_m2] Final     Comment:     Non  GFR Calc  Starting 12/18/2018, serum creatinine based estimated GFR (eGFR) will be   calculated using the Chronic Kidney Disease Epidemiology Collaboration   (CKD-EPI) equation.       Calcium   Date Value Ref Range Status   05/06/2024 9.8 8.8 - 10.2 mg/dL Final   06/29/2020 9.1 8.5 - 10.1 mg/dL Final     Phosphorus   Date Value Ref Range Status   04/04/2024 4.7 (H) 2.5 - 4.5 mg/dL Final     Albumin   Date Value Ref Range Status   05/06/2024 4.1 3.5 - 5.2 g/dL Final   07/18/2022 3.4 3.4 - 5.0 g/dL Final   06/29/2020 3.7 3.4 - 5.0 g/dL Final       Current Medication List:   Current Outpatient Medications (Antihypertensive, Cardiovascular, Diuretics, Beta blockers, Calcium blockers, Anticoagulants)   Medication Sig    lisinopril (ZESTRIL) 20 MG tablet Take 1 tablet (20 mg) by mouth daily.    amLODIPine (NORVASC) 5 MG tablet Take 1 tablet (5 mg) by mouth daily.     Current Outpatient Medications (Other)   Medication Sig    ASPIRIN 81 PO Take 81 mg by mouth daily    atorvastatin (LIPITOR) 20 MG tablet Take 1 tablet (20 mg) by mouth daily.    Biotin 36679 MCG TABS Take by mouth.    brimonidine-timolol (COMBIGAN) 0.2-0.5 % ophthalmic solution Place 1 drop into both eyes 2 times daily    levothyroxine (SYNTHROID/LEVOTHROID) 50 MCG tablet Take 1 tablet (50 mcg) by mouth daily.    Multiple Vitamin (MULTIVITAMIN) per tablet Take 1 tablet by mouth daily.    omeprazole (PRILOSEC) 20 MG DR capsule Take 1 capsule (20 mg) by mouth daily.    travoprost CARMELA FREE (TRAVATAN Z) 0.004 % ophthalmic solution Place 1 drop into the right eye At Bedtime Call clinic to schedule follow up appointment. For  additional refills, please schedule a follow-up appointment at [phone].  Past due for appt.    Vitamin D3 (VITAMIN D-1000 MAX ST) 25 mcg (1000 units) tablet Take 1,000 Units by mouth daily (Patient not taking: Reported on 9/6/2024)       Has patient had kidney transplant in the prior 1 year: no.   Has patient been seen the last 12 months: Yes.  Associated labs reviewed for medication: Yes    PLAN:     Medication refilled per protocol: Yes    LUCIE VELEZ RN

## 2025-05-14 ENCOUNTER — LAB (OUTPATIENT)
Dept: LAB | Facility: CLINIC | Age: 85
End: 2025-05-14
Attending: INTERNAL MEDICINE
Payer: MEDICARE

## 2025-05-14 ENCOUNTER — OFFICE VISIT (OUTPATIENT)
Dept: NEPHROLOGY | Facility: CLINIC | Age: 85
End: 2025-05-14
Attending: INTERNAL MEDICINE
Payer: MEDICARE

## 2025-05-14 VITALS
SYSTOLIC BLOOD PRESSURE: 136 MMHG | OXYGEN SATURATION: 97 % | HEIGHT: 60 IN | BODY MASS INDEX: 24.25 KG/M2 | WEIGHT: 123.5 LBS | DIASTOLIC BLOOD PRESSURE: 77 MMHG | HEART RATE: 50 BPM | TEMPERATURE: 98.1 F

## 2025-05-14 DIAGNOSIS — I10 ESSENTIAL HYPERTENSION, BENIGN: ICD-10-CM

## 2025-05-14 DIAGNOSIS — I10 ESSENTIAL HYPERTENSION, BENIGN: Primary | ICD-10-CM

## 2025-05-14 LAB
ALBUMIN SERPL BCG-MCNC: 4.1 G/DL (ref 3.5–5.2)
ALP SERPL-CCNC: 87 U/L (ref 40–150)
ALT SERPL W P-5'-P-CCNC: 20 U/L (ref 0–50)
ANION GAP SERPL CALCULATED.3IONS-SCNC: 10 MMOL/L (ref 7–15)
AST SERPL W P-5'-P-CCNC: 25 U/L (ref 0–45)
BILIRUB SERPL-MCNC: 0.6 MG/DL
BUN SERPL-MCNC: 21.7 MG/DL (ref 8–23)
CALCIUM SERPL-MCNC: 9.8 MG/DL (ref 8.8–10.4)
CHLORIDE SERPL-SCNC: 101 MMOL/L (ref 98–107)
CREAT SERPL-MCNC: 1.02 MG/DL (ref 0.51–0.95)
EGFRCR SERPLBLD CKD-EPI 2021: 53 ML/MIN/1.73M2
ERYTHROCYTE [DISTWIDTH] IN BLOOD BY AUTOMATED COUNT: 12.4 % (ref 10–15)
GLUCOSE SERPL-MCNC: 100 MG/DL (ref 70–99)
HCO3 SERPL-SCNC: 26 MMOL/L (ref 22–29)
HCT VFR BLD AUTO: 38 % (ref 35–47)
HGB BLD-MCNC: 12.5 G/DL (ref 11.7–15.7)
MCH RBC QN AUTO: 29.9 PG (ref 26.5–33)
MCHC RBC AUTO-ENTMCNC: 32.9 G/DL (ref 31.5–36.5)
MCV RBC AUTO: 91 FL (ref 78–100)
PLATELET # BLD AUTO: 168 10E3/UL (ref 150–450)
POTASSIUM SERPL-SCNC: 4.3 MMOL/L (ref 3.4–5.3)
PROT SERPL-MCNC: 6.7 G/DL (ref 6.4–8.3)
RBC # BLD AUTO: 4.18 10E6/UL (ref 3.8–5.2)
SODIUM SERPL-SCNC: 137 MMOL/L (ref 135–145)
WBC # BLD AUTO: 3.1 10E3/UL (ref 4–11)

## 2025-05-14 PROCEDURE — 36415 COLL VENOUS BLD VENIPUNCTURE: CPT | Performed by: PATHOLOGY

## 2025-05-14 PROCEDURE — G0463 HOSPITAL OUTPT CLINIC VISIT: HCPCS | Performed by: INTERNAL MEDICINE

## 2025-05-14 PROCEDURE — 85027 COMPLETE CBC AUTOMATED: CPT | Performed by: PATHOLOGY

## 2025-05-14 PROCEDURE — 80053 COMPREHEN METABOLIC PANEL: CPT | Performed by: PATHOLOGY

## 2025-05-14 ASSESSMENT — PAIN SCALES - GENERAL: PAINLEVEL_OUTOF10: NO PAIN (0)

## 2025-05-14 NOTE — PROGRESS NOTES
Nephrology Clinic    Marli Ponce MRN:[id number] YOB: 1940  Date of Service: 05/14/2025  Primary care provider: Khris Luciano  Requesting physician: Khris Luciano      REASON FOR CONSULT: Resistant hypertension    HISTORY OF PRESENT ILLNESS:   Marli Ponce is an 84 year old female who first presented for evaluation of resistant hypertension in  April 2024.  The past medical history is significant for hypertension for more than 20 years but just noticed a few months prior to be resistant and the patient reported that she first noted her blood pressure to be elevated when she took a cough medication in February 2024. She subsequently presented to the ED on 2/21/24 with headache and a BP at 198/93. She was at that time on lisinopril 20 mg once daily only and it was increased to 40 mg once daily and amlodipine 5 mg was also added. However the patient didn't take it and presented again on 3/6/24 to [facility] with hypertensive emergency. She was discharged on amlodipine 2.5 mg and lisinopril. She saw her PCP on 3/14/24 and a work up for secondary hypertension including renin, aldosterone levels, plasma free metanephrines and renal ultrasound with arterial duplex was sent and was essentially negative except for a possible right renal artery stenosis. Spironolactone was tried but the patient couldn't tolerate it. She was at the time of the visit on lisinopril 20 mg twice daily only. Her BP was 125/73 in clinic but she reported that it was still uncontrolled at home especially in the morning. Her creatinine level was 1.1 on 4/4/24 close to her usual baseline. There was no evidence of any significant albuminuria. At her first visit, amlodipine 5 mg daily was added to lisinopril 20 mg twice daily. After those changes she brought a BP diary that showed a blood pressure that was very well controlled and even low at times. Lisinopril was subsequently decreased to 20 mg once daily. Toady she  reports a well controlled BP but reports an episode of syncope a few months ago. Her HR is 50 in clinic. The latest creatinine level is 1.02 mg/dL and the potassium is 4.3.  The patient denies any dysuria, any pollakiuria, any nocturia, any LE edema, any dyspnea on exertion .  The patient denies ever having kidney stones, urinary tract infections, gross hematuria. There is no family history of CKD.  The following portions of the patient's history were reviewed and updated as appropriate: allergies, current medications, past family history, past medical history, past social history, past surgical history and problem list.    ULTRASOUND RENAL COMPLETE WITH DOPPLER 3/22/2024 1:16 PM     CLINICAL HISTORY: Recent severe htn; Hypertensive urgency.       COMPARISONS: None available.     ORDERING PROVIDER: GENARO KRAUSE     TECHNIQUE: B-mode (grayscale) and duplex Doppler evaluation of the  abdominal aorta and renal arteries performed. Velocity measurements  obtained with angle correction at or less than 60 degrees.     Cine clips through the kidneys were saved in the patient's record.     Findings:     AORTA:       Suprarenal: 45/7 cm/s, arterial monophasic       Infrarenal, proximal: 92/0 cm/s, triphasic       Infrarenal, distal: 73/0 cm/s, triphasic     RIGHT KIDNEY:       Renal artery:            Origin: 123/18 cm/s, 124/23 cm/s            Proximal: 176/38 cm/s, 175/40 cm/s            Mid: 118/21 cm/s, 124/24 cm/s            HIlum: 85/16 cm/s          Renal artery - aortic ratio: 3.9          Renal vein: 18 cm/s          Length: 8.0 cm          Cortical thickness: 0.7 cm          Segmental artery resistive index (superior / mid / inferior):  0.75 / 0.80 / 0.79          Parenchyma: Normal. No stone, mass, or hydronephrosis.     LEFT KIDNEY:       Renal artery:            Origin: 98/18 cm/s            Proximal: 138/25 cm/s, 124/19 cm/s            Mid: 74/11 cm/s            Hilum: 67/11 cm/s          Renal artery -  aortic ratio: 3.07          Renal vein: 12 cm/s          Length: 9.6 cm          Cortical thickness: 0.7 cm          Segmental artery resistive index (superior / mid / inferior):  0.75 / 0.77 / 0.77          Parenchyma: Normal. No stone, mass, or hydronephrosis.                                                                      IMPRESSION:   1. Right renal artery velocity is less than 180 cm/s, but the velocity  ratio exceeds 3.5 (3.9). Distal waveforms are not post stenotic in  appearance. If clinically indicated and patient's renal function can  tolerate contrast, further evaluation with CTA could be performed.     2. Left renal artery stenosis not demonstrated.     3. Subcentimeter cortical thicknesses and elevated segmental artery  resistive indices suggest medical renal disease.     4. Negative grayscale appearance of the kidneys.     Guidelines:  Diagnostic criteria suggestive of > 60% diameter stenosis  Renal artery:       Peak systolic velocity > 180 cm/s       RAR > 3.5       Turbulent flow     Arcuate artery Resistive Index Normal < 0.7     I have personally reviewed the examination and initial interpretation  and I agree with the findings.     ELODIA DICKINSON MD     PAST MEDICAL HISTORY:  Past Medical History:   Diagnosis Date    Cataract     Hyperlipidemia LDL goal < 130     Hypertension     white coat; home blood pressure monitoring April 2016  average systolic blood pressure approximately 115.    Hypothyroidism     Low bone density 8/16/2017    FRAX tenure probability of fracture, major osteoporotic: 14.6%; hip fracture 4% (increased) Plan:  Alendronate 70 mg weekly; trial basis to assess for reflux esophagitis    Ocular hypertension     Primary open-angle glaucoma     adv     PAST SURGICAL HISTORY:  Past Surgical History:   Procedure Laterality Date    CHOLECYSTECTOMY      INJECT EPIDURAL LUMBAR N/A 7/6/2023    Procedure: INJECTION, SPINE, LUMBAR, EPIDURAL L5/S1;  Surgeon: Rsuty Skelton MD;   Location: [facility] OR    LAPAROSCOPIC APPENDECTOMY      LASER IRIDOTOMY OD (RIGHT EYE)      RE/LE  pre 2003    LASER IRIDOTOMY OS (LEFT EYE)  11/20/2010    SELECTIVE LASER TRABECULOPLASTY (SLT) OS (LEFT EYE)  1/22/2010    LE    TRABECULECTOMY, MITOMYCIN FILTER, COMBINED  1/10/2012    LE     MEDICATIONS:  Prescription Medications as of 5/14/2025         Rx Number Disp Refills Start End Last Dispensed Date Next Fill Date Owning Pharmacy    amLODIPine (NORVASC) 5 MG tablet  90 tablet 3 4/17/2025 --   [facility] [address] [place] [address], [place] - [address] AT [place] [id number]    Sig: Take 1 tablet (5 mg) by mouth daily.    Class: E-Prescribe    Route: Oral    ASPIRIN 81 PO  -- --  --       Sig: Take 81 mg by mouth daily    Class: Historical    Route: Oral    brimonidine-timolol (COMBIGAN) 0.2-0.5 % ophthalmic solution  -- --  --       Sig: Place 1 drop into both eyes 2 times daily    Class: Historical    Route: Both Eyes    levothyroxine (SYNTHROID/LEVOTHROID) 50 MCG tablet  90 tablet 3 9/6/2024 --   [facility] [address] [place] [address], [place] - [address] [place] [address]    Sig: Take 1 tablet (50 mcg) by mouth daily.    Class: E-Prescribe    Route: Oral    lisinopril (ZESTRIL) 20 MG tablet  90 tablet 3 4/30/2025 --   [facility] [address] [place] [address], [place] [address] [place] [address]    Sig: Take 1 tablet (20 mg) by mouth daily.    Class: E-Prescribe    Route: Oral    Multiple Vitamin (MULTIVITAMIN) per tablet  -- --  --       Sig: Take 1 tablet by mouth daily.    Class: Historical    Route: Oral    omeprazole (PRILOSEC) 20 MG DR capsule  90 capsule 3 9/6/2024 --   [facility] [id number] [place] [address], [place] - [address] AT [place] [address]    Sig: Take 1 capsule (20 mg) by mouth daily.    Class: E-Prescribe    Route: Oral    travoprost CARMELA FREE (TRAVATAN Z) 0.004 % ophthalmic solution  2.5 mL 1 12/3/2020 --   [facility] #[id number] -  [facility] [address] AT [facility] [address]    Sig: Place 1 drop into the right eye At Bedtime Call clinic to schedule follow up appointment. For additional refills, please schedule a follow-up appointment at [phone].  Past due for appt.    Class: E-Prescribe    Route: Right Eye    atorvastatin (LIPITOR) 20 MG tablet  90 tablet 3 2024 --   [facility] #[id number] - [facility] [address] AT [facility] [address]    Sig: Take 1 tablet (20 mg) by mouth daily.    Class: E-Prescribe    Route: Oral    Biotin 40767 MCG TABS  -- --  --       Sig: Take by mouth.    Class: Historical    Route: Oral    Vitamin D3 (VITAMIN D-1000 MAX ST) 25 mcg (1000 units) tablet  -- --  --       Sig: Take 1,000 Units by mouth daily    Class: Historical    Route: Oral           ALLERGIES:    No Known Allergies  REVIEW OF SYSTEMS:    A comprehensive review of systems was performed and found to be negative except as described here or above.  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    5 children one , 4 sons, 7 grandchildren 4 greatgrands    G5                 Social Drivers of Health     Financial Resource Strain: High Risk (1/15/2025)    Financial Resource Strain     Within the past 12 months, have you or your family members you live with been unable to get utilities (heat, electricity) when it was  really needed?: Yes   Food Insecurity: Low Risk  (1/15/2025)    Food Insecurity     Within the past 12 months, did you worry that your food would run out before you got money to buy more?: No     Within the past 12 months, did the food you bought just not last and you didn t have money to get more?: No   Transportation Needs: Low Risk  (1/15/2025)    Transportation Needs     Within the past 12 months, has lack of transportation kept you from medical appointments, getting your medicines, non-medical meetings or appointments, work, or from getting things that you need?: No   Physical Activity: Unknown (2024)    Exercise Vital Sign     Days of Exercise per Week: 5 days     Minutes of Exercise per Session: Not on file   Stress: No Stress Concern Present (2024)    [employer] [employer] - Occupational Stress Questionnaire     Feeling of Stress : Not at all   Social Connections: Unknown (2024)    Social Connection and Isolation Panel [NHANES]     Frequency of Communication with Friends and Family: Not on file     Frequency of Social Gatherings with Friends and Family: Twice a week     Attends Nondenominational Services: Not on file     Active Member of Clubs or Organizations: Not on file     Attends Club or Organization Meetings: Not on file     Marital Status: Not on file   Interpersonal Safety: Not on file   Housing Stability: Low Risk  (1/15/2025)    Housing Stability     Do you have housing? : Yes     Are you worried about losing your housing?: No     FAMILY MEDICAL HISTORY:   Family History   Problem Relation Age of Onset    Cancer Mother 78        stomach and  at 80    Hypertension Mother     Glaucoma Mother     Breast Cancer Sister 38        still living    Hypertension Father     Cerebrovascular Disease Father 52    Glaucoma Maternal Grandfather      PHYSICAL EXAM:   /77   Pulse 50   Temp 98.1  F (36.7  C) (Oral)   Ht 1.524 m (5')   Wt 56 kg (123 lb 8 oz)   SpO2 97%   BMI  24.12 kg/m    GENERAL APPEARANCE: alert and no distress  EYES: nonicteric  HENT: mouth without ulcers or lesions  NECK: supple, no adenopathy  RESP: lungs clear to auscultation   CV: regular rhythm, normal rate, no rub  ABDOMEN: soft, nontender, normal bowel sounds, no HSM   Extremities: no clubbing, cyanosis, or edema  MS: no evidence of inflammation in joints, no muscle tenderness  SKIN: no rash  NEURO: mentation intact and speech normal  PSYCH: affect normal/bright   LABS:   Recent Results (from the past 4 weeks)   CBC with platelets    Collection Time: 05/14/25 10:51 AM   Result Value Ref Range    WBC Count 3.1 (L) 4.0 - 11.0 10e3/uL    RBC Count 4.18 3.80 - 5.20 10e6/uL    Hemoglobin 12.5 11.7 - 15.7 g/dL    Hematocrit 38.0 35.0 - 47.0 %    MCV 91 78 - 100 fL    MCH 29.9 26.5 - 33.0 pg    MCHC 32.9 31.5 - 36.5 g/dL    RDW 12.4 10.0 - 15.0 %    Platelet Count 168 150 - 450 10e3/uL   Comprehensive metabolic panel (BMP + Alb, Alk Phos, ALT, AST, Total. Bili, TP)    Collection Time: 05/14/25 10:51 AM   Result Value Ref Range    Sodium 137 135 - 145 mmol/L    Potassium 4.3 3.4 - 5.3 mmol/L    Carbon Dioxide (CO2) 26 22 - 29 mmol/L    Anion Gap 10 7 - 15 mmol/L    Urea Nitrogen 21.7 8.0 - 23.0 mg/dL    Creatinine 1.02 (H) 0.51 - 0.95 mg/dL    GFR Estimate 53 (L) >60 mL/min/1.73m2    Calcium 9.8 8.8 - 10.4 mg/dL    Chloride 101 98 - 107 mmol/L    Glucose 100 (H) 70 - 99 mg/dL    Alkaline Phosphatase 87 40 - 150 U/L    AST 25 0 - 45 U/L    ALT 20 0 - 50 U/L    Protein Total 6.7 6.4 - 8.3 g/dL    Albumin 4.1 3.5 - 5.2 g/dL    Bilirubin Total 0.6 <=1.2 mg/dL     CMP  Recent Labs   Lab Test 05/14/25  1051 05/06/24  1239 04/04/24  1328 03/19/24  1021 03/14/24  1713 07/27/23  1110 07/15/21  1243 06/29/20  1219 07/02/19  1719 05/14/18  1247 05/10/17  1201 05/10/17  1201    138 136 136 130* 140   < > 139 134 139   < >  --    POTASSIUM 4.3 5.3 5.1 5.1 4.9 4.2   < > 4.1 4.0 4.6  --  4.9   CHLORIDE 101 103 99 98 94* 102    < > 106 101 106   < >  --    CO2 26 26 26 30* 26 28   < > 29 26 27   < >  --    ANIONGAP 10 9 11 8 10 10   < > 4 6 6   < >  --    * 100* 98 109* 96 99   < > 89 89 90   < >  --    BUN 21.7 19.8 17.8 13.3 16.2 14.9   < > 18 12 14   < >  --    CR 1.02* 1.12* 1.10* 1.01* 0.92 0.98*   < > 0.86 0.87 0.90  --  0.95   GFRESTIMATED 53* 48* 49* 54* 61 57*   < > 63 62 60*  --  57*   GFRESTBLACK  --   --   --   --   --   --   --  73 72 73  --  69   ROSS 9.8 9.8 10.0 10.0 9.9 9.9   < > 9.1 9.3 9.2   < >  --    PHOS  --   --  4.7*  --   --   --   --   --   --   --   --   --    PROTTOTAL 6.7 6.6  --   --  7.4 6.8   < > 7.0  --   --   --   --    ALBUMIN 4.1 4.1 4.3  --  4.5 4.4   < > 3.7  --   --   --   --    BILITOTAL 0.6 0.8  --   --  0.6 0.7   < > 0.8  --   --   --   --    ALKPHOS 87 77  --   --  81 81   < > 90  --   --   --   --    AST 25 21  --   --  23 23   < > 18  --   --   --   --    ALT 20 15  --   --  18 17   < > 22  --   --   --   --     < > = values in this interval not displayed.     CBC  Recent Labs   Lab Test 05/14/25  1051 05/06/24  1239 04/04/24  1328 03/14/24  1713   HGB 12.5 13.6 13.8 13.8   WBC 3.1* 3.6* 3.3* 3.8*   RBC 4.18 4.43 4.51 4.57   HCT 38.0 40.2 40.0 40.1   MCV 91 91 89 88   MCH 29.9 30.7 30.6 30.2   MCHC 32.9 33.8 34.5 34.4   RDW 12.4 12.3 12.1 11.9    214 208 263     INRNo lab results found.  ABGNo lab results found.   URINE STUDIES  Recent Labs   Lab Test 05/06/24  1243 04/04/24  1347 03/14/24  1650   COLOR Light Yellow Light Yellow Straw   APPEARANCE Clear Clear Clear   URINEGLC Negative Negative Negative   URINEBILI Negative Negative Negative   URINEKETONE Negative Negative Negative   SG 1.017 1.016 1.004   UBLD Negative Negative Negative   URINEPH 6.5 6.5 6.5   PROTEIN Negative Negative Negative   NITRITE Negative Negative Negative   LEUKEST Negative Negative Negative   RBCU  --  <1  --    WBCU  --  <1  --      No lab results found.    ASSESSMENT AND PLAN:   #Hypertension  Review of  the file shows that the patient had uncontrolled hypertension for many years but was not aware of it. While a component of renal artery stenosis is possible, there seems to be a lot of anxiety-driven high BP too. In addition, the patient has not been trying a regimen long enough to see if it works and I explained all this to her and her . Despite the fact that her renin aldosterone level is elevated, the aldosterone level is not high enough to consider that she could have hyperaldosteronism. I  changed her regimen to lisinopril 20 mg once daily and amlodipine 5 mg daily and she has now a well controlled BP even if the HR seems to be low at 50 and she also reported a syncope. I sent a message to her PCP to have her do a ziopatch. The patient was also instructed to keep the sodium intake around 2300 mg /day, follow and to avoid NSAIDs     #CKD  Mild and mainly due to uncontrolled BP and decline related to age. Proteinuria is well controlled on lisinopril    #CVD/dyslipidemia  Management per her PCP    #Blood count  Hemoglobin 13.8 -> 12.5 normal    #Acid-base status  CO2 level 26  No need for sodium bicarbonate supplementation    #Electrolytes  Na 136  K 5.1-> 5.3 -> 4.3 now normal  No acute issues    #BMD  Calcium  10 -> 9.8         Phosphorus  4.7  Albumin 4.3  Vitamin D level 79 in July 2023 I stopped vitamin D supplementation    #CKD journey/transplant not a candidate at this point    The total time of this encounter amounted to 30 minutes on the day of the encounter. This time included time spent with the patient, reviewing records, ordering tests, and performing post visit documentation.     The patient will return to follow up as needed    Anabel Perez MD  Division of Renal Diseases and Hypertension

## 2025-05-14 NOTE — LETTER
5/14/2025       RE: Marli Ponce  [address] [facility] [facility] [address]     Dear Colleague,    Thank you for referring your patient, Marli Ponce, to the [facility] NEPHROLOGY [facility] [place] at [facility]. Please see a copy of my visit note below.    Nephrology Clinic    Marli Ponce MRN:[id number] YOB: 1940  Date of Service: 05/14/2025  Primary care provider: Khris Luciano  Requesting physician: Khris Luciano      REASON FOR CONSULT: Resistant hypertension    HISTORY OF PRESENT ILLNESS:   Marli Ponce is an 84 year old female who first presented for evaluation of resistant hypertension in  April 2024.  The past medical history is significant for hypertension for more than 20 years but just noticed a few months prior to be resistant and the patient reported that she first noted her blood pressure to be elevated when she took a cough medication in February 2024. She subsequently presented to the ED on 2/21/24 with headache and a BP at 198/93. She was at that time on lisinopril 20 mg once daily only and it was increased to 40 mg once daily and amlodipine 5 mg was also added. However the patient didn't take it and presented again on 3/6/24 to [facility] with hypertensive emergency. She was discharged on amlodipine 2.5 mg and lisinopril. She saw her PCP on 3/14/24 and a work up for secondary hypertension including renin, aldosterone levels, plasma free metanephrines and renal ultrasound with arterial duplex was sent and was essentially negative except for a possible right renal artery stenosis. Spironolactone was tried but the patient couldn't tolerate it. She was at the time of the visit on lisinopril 20 mg twice daily only. Her BP was 125/73 in clinic but she reported that it was still uncontrolled at home especially in the morning. Her creatinine level was 1.1 on 4/4/24 close to her usual baseline. There was no  evidence of any significant albuminuria. At her first visit, amlodipine 5 mg daily was added to lisinopril 20 mg twice daily. After those changes she brought a BP diary that showed a blood pressure that was very well controlled and even low at times. Lisinopril was subsequently decreased to 20 mg once daily. Toady she reports a well controlled BP but reports an episode of syncope a few months ago. Her HR is 50 in clinic. The latest creatinine level is 1.02 mg/dL and the potassium is 4.3.  The patient denies any dysuria, any pollakiuria, any nocturia, any LE edema, any dyspnea on exertion .  The patient denies ever having kidney stones, urinary tract infections, gross hematuria. There is no family history of CKD.  The following portions of the patient's history were reviewed and updated as appropriate: allergies, current medications, past family history, past medical history, past social history, past surgical history and problem list.    ULTRASOUND RENAL COMPLETE WITH DOPPLER 3/22/2024 1:16 PM     CLINICAL HISTORY: Recent severe htn; Hypertensive urgency.       COMPARISONS: None available.     ORDERING PROVIDER: GENARO KRAUSE     TECHNIQUE: B-mode (grayscale) and duplex Doppler evaluation of the  abdominal aorta and renal arteries performed. Velocity measurements  obtained with angle correction at or less than 60 degrees.     Cine clips through the kidneys were saved in the patient's record.     Findings:     AORTA:       Suprarenal: 45/7 cm/s, arterial monophasic       Infrarenal, proximal: 92/0 cm/s, triphasic       Infrarenal, distal: 73/0 cm/s, triphasic     RIGHT KIDNEY:       Renal artery:            Origin: 123/18 cm/s, 124/23 cm/s            Proximal: 176/38 cm/s, 175/40 cm/s            Mid: 118/21 cm/s, 124/24 cm/s            HIlum: 85/16 cm/s          Renal artery - aortic ratio: 3.9          Renal vein: 18 cm/s          Length: 8.0 cm          Cortical thickness: 0.7 cm          Segmental artery  resistive index (superior / mid / inferior):  0.75 / 0.80 / 0.79          Parenchyma: Normal. No stone, mass, or hydronephrosis.     LEFT KIDNEY:       Renal artery:            Origin: 98/18 cm/s            Proximal: 138/25 cm/s, 124/19 cm/s            Mid: 74/11 cm/s            Hilum: 67/11 cm/s          Renal artery - aortic ratio: 3.07          Renal vein: 12 cm/s          Length: 9.6 cm          Cortical thickness: 0.7 cm          Segmental artery resistive index (superior / mid / inferior):  0.75 / 0.77 / 0.77          Parenchyma: Normal. No stone, mass, or hydronephrosis.                                                                      IMPRESSION:   1. Right renal artery velocity is less than 180 cm/s, but the velocity  ratio exceeds 3.5 (3.9). Distal waveforms are not post stenotic in  appearance. If clinically indicated and patient's renal function can  tolerate contrast, further evaluation with CTA could be performed.     2. Left renal artery stenosis not demonstrated.     3. Subcentimeter cortical thicknesses and elevated segmental artery  resistive indices suggest medical renal disease.     4. Negative grayscale appearance of the kidneys.     Guidelines:  Diagnostic criteria suggestive of > 60% diameter stenosis  Renal artery:       Peak systolic velocity > 180 cm/s       RAR > 3.5       Turbulent flow     Arcuate artery Resistive Index Normal < 0.7     I have personally reviewed the examination and initial interpretation  and I agree with the findings.     ELODIA DICKINSON MD     PAST MEDICAL HISTORY:  Past Medical History:   Diagnosis Date     Cataract      Hyperlipidemia LDL goal < 130      Hypertension     white coat; home blood pressure monitoring April 2016  average systolic blood pressure approximately 115.     Hypothyroidism      Low bone density 8/16/2017    FRAX tenure probability of fracture, major osteoporotic: 14.6%; hip fracture 4% (increased) Plan:  Alendronate 70 mg weekly; trial basis to  assess for reflux esophagitis     Ocular hypertension      Primary open-angle glaucoma     adv     PAST SURGICAL HISTORY:  Past Surgical History:   Procedure Laterality Date     CHOLECYSTECTOMY       INJECT EPIDURAL LUMBAR N/A 7/6/2023    Procedure: INJECTION, SPINE, LUMBAR, EPIDURAL L5/S1;  Surgeon: Rusty Skelton MD;  Location: [facility] OR     LAPAROSCOPIC APPENDECTOMY       LASER IRIDOTOMY OD (RIGHT EYE)      RE/LE  pre 2003     LASER IRIDOTOMY OS (LEFT EYE)  11/20/2010     SELECTIVE LASER TRABECULOPLASTY (SLT) OS (LEFT EYE)  1/22/2010    LE     TRABECULECTOMY, MITOMYCIN FILTER, COMBINED  1/10/2012    LE     MEDICATIONS:  Prescription Medications as of 5/14/2025         Rx Number Disp Refills Start End Last Dispensed Date Next Fill Date Owning Pharmacy    amLODIPine (NORVASC) 5 MG tablet  90 tablet 3 4/17/2025 --   [facility] [id number] [place] [address] - [address] [facility] [address] AT [place] [address]    Sig: Take 1 tablet (5 mg) by mouth daily.    Class: E-Prescribe    Route: Oral    ASPIRIN 81 PO  -- --  --       Sig: Take 81 mg by mouth daily    Class: Historical    Route: Oral    brimonidine-timolol (COMBIGAN) 0.2-0.5 % ophthalmic solution  -- --  --       Sig: Place 1 drop into both eyes 2 times daily    Class: Historical    Route: Both Eyes    levothyroxine (SYNTHROID/LEVOTHROID) 50 MCG tablet  90 tablet 3 9/6/2024 --   [facility] [address] [place] [address] [address] [facility] [address] [place] [address]    Sig: Take 1 tablet (50 mcg) by mouth daily.    Class: E-Prescribe    Route: Oral    lisinopril (ZESTRIL) 20 MG tablet  90 tablet 3 4/30/2025 --   [facility] [id number] [place] [address] [address] [place] [address]    Sig: Take 1 tablet (20 mg) by mouth daily.    Class: E-Prescribe    Route: Oral    Multiple Vitamin (MULTIVITAMIN) per tablet  -- --  --       Sig: Take 1 tablet by mouth daily.    Class: Historical    Route: Oral    omeprazole (PRILOSEC) 20 MG   capsule  90 capsule 3 9/6/2024 --   [facility] #[id number], [place] - [address] AT [place] [address]    Sig: Take 1 capsule (20 mg) by mouth daily.    Class: E-Prescribe    Route: Oral    travoprost CARMELA FREE (TRAVATAN Z) 0.004 % ophthalmic solution  2.5 mL 1 12/3/2020 --   [facility][id number]  [facility] [place] - [address] AT [place] [address]    Sig: Place 1 drop into the right eye At Bedtime Call clinic to schedule follow up appointment. For additional refills, please schedule a follow-up appointment at [phone].  Past due for appt.    Class: E-Prescribe    Route: Right Eye    atorvastatin (LIPITOR) 20 MG tablet  90 tablet 3 9/6/2024 --   [facility] #[id number], [place] - [address] AT [place] [address]    Sig: Take 1 tablet (20 mg) by mouth daily.    Class: E-Prescribe    Route: Oral    Biotin 34946 MCG TABS  -- --  --       Sig: Take by mouth.    Class: Historical    Route: Oral    Vitamin D3 (VITAMIN D-1000 MAX ST) 25 mcg (1000 units) tablet  -- --  --       Sig: Take 1,000 Units by mouth daily    Class: Historical    Route: Oral           ALLERGIES:    No Known Allergies  REVIEW OF SYSTEMS:    A comprehensive review of systems was performed and found to be negative except as described here or above.  SOCIAL HISTORY:   Social History     Socioeconomic History     Marital status:      Spouse name: Not on file     Number of children: Not on file     Years of education: Not on file     Highest education level: Not on file   Occupational History     Not on file   Tobacco Use     Smoking status: Never     Smokeless tobacco: Never   Substance and Sexual Activity     Alcohol use: Yes     Alcohol/week: 0.0 standard drinks of alcohol     Comment: rare tequilla     Drug use: No     Sexual activity: Yes     Partners: Male     Comment: 1962   Other Topics Concern     Not on file   Social History Narrative     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    5 children one , 4 sons, 7 grandchildren 4 greatgrands    G5                 Social Drivers of Health     Financial Resource Strain: High Risk (1/15/2025)    Financial Resource Strain      Within the past 12 months, have you or your family members you live with been unable to get utilities (heat, electricity) when it was really needed?: Yes   Food Insecurity: Low Risk  (1/15/2025)    Food Insecurity      Within the past 12 months, did you worry that your food would run out before you got money to buy more?: No      Within the past 12 months, did the food you bought just not last and you didn t have money to get more?: No   Transportation Needs: Low Risk  (1/15/2025)    Transportation Needs      Within the past 12 months, has lack of transportation kept you from medical appointments, getting your medicines, non-medical meetings or appointments, work, or from getting things that you need?: No   Physical Activity: Unknown (2024)    Exercise Vital Sign      Days of Exercise per Week: 5 days      Minutes of Exercise per Session: Not on file   Stress: No Stress Concern Present (2024)    [employer] - Occupational Stress Questionnaire      Feeling of Stress : Not at all   Social Connections: Unknown (2024)    Social Connection and Isolation Panel [NHANES]      Frequency of Communication with Friends and Family: Not on file      Frequency of Social Gatherings with Friends and Family: Twice a week      Attends Congregational Services: Not on file      Active Member of Clubs or Organizations: Not on file      Attends Club or Organization Meetings: Not on file      Marital Status: Not on file   Interpersonal Safety: Not on file   Housing Stability: Low Risk  (1/15/2025)    Housing Stability      Do you have housing? : Yes      Are you worried about losing your housing?: No     FAMILY MEDICAL HISTORY:   Family History   Problem Relation Age of Onset     Cancer Mother 78         stomach and  at 80     Hypertension Mother      Glaucoma Mother      Breast Cancer Sister 38        still living     Hypertension Father      Cerebrovascular Disease Father 52     Glaucoma Maternal Grandfather      PHYSICAL EXAM:   /77   Pulse 50   Temp 98.1  F (36.7  C) (Oral)   Ht 1.524 m (5')   Wt 56 kg (123 lb 8 oz)   SpO2 97%   BMI 24.12 kg/m    GENERAL APPEARANCE: alert and no distress  EYES: nonicteric  HENT: mouth without ulcers or lesions  NECK: supple, no adenopathy  RESP: lungs clear to auscultation   CV: regular rhythm, normal rate, no rub  ABDOMEN: soft, nontender, normal bowel sounds, no HSM   Extremities: no clubbing, cyanosis, or edema  MS: no evidence of inflammation in joints, no muscle tenderness  SKIN: no rash  NEURO: mentation intact and speech normal  PSYCH: affect normal/bright   LABS:   Recent Results (from the past 4 weeks)   CBC with platelets    Collection Time: 25 10:51 AM   Result Value Ref Range    WBC Count 3.1 (L) 4.0 - 11.0 10e3/uL    RBC Count 4.18 3.80 - 5.20 10e6/uL    Hemoglobin 12.5 11.7 - 15.7 g/dL    Hematocrit 38.0 35.0 - 47.0 %    MCV 91 78 - 100 fL    MCH 29.9 26.5 - 33.0 pg    MCHC 32.9 31.5 - 36.5 g/dL    RDW 12.4 10.0 - 15.0 %    Platelet Count 168 150 - 450 10e3/uL   Comprehensive metabolic panel (BMP + Alb, Alk Phos, ALT, AST, Total. Bili, TP)    Collection Time: 25 10:51 AM   Result Value Ref Range    Sodium 137 135 - 145 mmol/L    Potassium 4.3 3.4 - 5.3 mmol/L    Carbon Dioxide (CO2) 26 22 - 29 mmol/L    Anion Gap 10 7 - 15 mmol/L    Urea Nitrogen 21.7 8.0 - 23.0 mg/dL    Creatinine 1.02 (H) 0.51 - 0.95 mg/dL    GFR Estimate 53 (L) >60 mL/min/1.73m2    Calcium 9.8 8.8 - 10.4 mg/dL    Chloride 101 98 - 107 mmol/L    Glucose 100 (H) 70 - 99 mg/dL    Alkaline Phosphatase 87 40 - 150 U/L    AST 25 0 - 45 U/L    ALT 20 0 - 50 U/L    Protein Total 6.7 6.4 - 8.3 g/dL    Albumin 4.1 3.5 - 5.2 g/dL    Bilirubin Total 0.6 <=1.2 mg/dL      CMP  Recent Labs   Lab Test 05/14/25  1051 05/06/24  1239 04/04/24  1328 03/19/24  1021 03/14/24  1713 07/27/23  1110 07/15/21  1243 06/29/20  1219 07/02/19  1719 05/14/18  1247 05/10/17  1201 05/10/17  1201    138 136 136 130* 140   < > 139 134 139   < >  --    POTASSIUM 4.3 5.3 5.1 5.1 4.9 4.2   < > 4.1 4.0 4.6  --  4.9   CHLORIDE 101 103 99 98 94* 102   < > 106 101 106   < >  --    CO2 26 26 26 30* 26 28   < > 29 26 27   < >  --    ANIONGAP 10 9 11 8 10 10   < > 4 6 6   < >  --    * 100* 98 109* 96 99   < > 89 89 90   < >  --    BUN 21.7 19.8 17.8 13.3 16.2 14.9   < > 18 12 14   < >  --    CR 1.02* 1.12* 1.10* 1.01* 0.92 0.98*   < > 0.86 0.87 0.90  --  0.95   GFRESTIMATED 53* 48* 49* 54* 61 57*   < > 63 62 60*  --  57*   GFRESTBLACK  --   --   --   --   --   --   --  73 72 73  --  69   ROSS 9.8 9.8 10.0 10.0 9.9 9.9   < > 9.1 9.3 9.2   < >  --    PHOS  --   --  4.7*  --   --   --   --   --   --   --   --   --    PROTTOTAL 6.7 6.6  --   --  7.4 6.8   < > 7.0  --   --   --   --    ALBUMIN 4.1 4.1 4.3  --  4.5 4.4   < > 3.7  --   --   --   --    BILITOTAL 0.6 0.8  --   --  0.6 0.7   < > 0.8  --   --   --   --    ALKPHOS 87 77  --   --  81 81   < > 90  --   --   --   --    AST 25 21  --   --  23 23   < > 18  --   --   --   --    ALT 20 15  --   --  18 17   < > 22  --   --   --   --     < > = values in this interval not displayed.     CBC  Recent Labs   Lab Test 05/14/25  1051 05/06/24  1239 04/04/24  1328 03/14/24  1713   HGB 12.5 13.6 13.8 13.8   WBC 3.1* 3.6* 3.3* 3.8*   RBC 4.18 4.43 4.51 4.57   HCT 38.0 40.2 40.0 40.1   MCV 91 91 89 88   MCH 29.9 30.7 30.6 30.2   MCHC 32.9 33.8 34.5 34.4   RDW 12.4 12.3 12.1 11.9    214 208 263     INRNo lab results found.  ABGNo lab results found.   URINE STUDIES  Recent Labs   Lab Test 05/06/24  1243 04/04/24  1347 03/14/24  1650   COLOR Light Yellow Light Yellow Straw   APPEARANCE Clear Clear Clear   URINEGLC Negative Negative Negative   URINEBILI  Negative Negative Negative   URINEKETONE Negative Negative Negative   SG 1.017 1.016 1.004   UBLD Negative Negative Negative   URINEPH 6.5 6.5 6.5   PROTEIN Negative Negative Negative   NITRITE Negative Negative Negative   LEUKEST Negative Negative Negative   RBCU  --  <1  --    WBCU  --  <1  --      No lab results found.    ASSESSMENT AND PLAN:   #Hypertension  Review of the file shows that the patient had uncontrolled hypertension for many years but was not aware of it. While a component of renal artery stenosis is possible, there seems to be a lot of anxiety-driven high BP too. In addition, the patient has not been trying a regimen long enough to see if it works and I explained all this to her and her . Despite the fact that her renin aldosterone level is elevated, the aldosterone level is not high enough to consider that she could have hyperaldosteronism. I  changed her regimen to lisinopril 20 mg once daily and amlodipine 5 mg daily and she has now a well controlled BP even if the HR seems to be low at 50 and she also reported a syncope. I sent a message to her PCP to have her do a ziopatch. The patient was also instructed to keep the sodium intake around 2300 mg /day, follow and to avoid NSAIDs     #CKD  Mild and mainly due to uncontrolled BP and decline related to age. Proteinuria is well controlled on lisinopril    #CVD/dyslipidemia  Management per her PCP    #Blood count  Hemoglobin 13.8 -> 12.5 normal    #Acid-base status  CO2 level 26  No need for sodium bicarbonate supplementation    #Electrolytes  Na 136  K 5.1-> 5.3 -> 4.3 now normal  No acute issues    #BMD  Calcium  10 -> 9.8         Phosphorus  4.7  Albumin 4.3  Vitamin D level 79 in July 2023 I stopped vitamin D supplementation    #CKD journey/transplant not a candidate at this point    The total time of this encounter amounted to 30 minutes on the day of the encounter. This time included time spent with the patient, reviewing records,  ordering tests, and performing post visit documentation.     The patient will return to follow up as needed    Anabel Perez MD  Division of Renal Diseases and Hypertension      Again, thank you for allowing me to participate in the care of your patient.      Sincerely,    Anabel Perez MD

## 2025-05-14 NOTE — NURSING NOTE
Chief Complaint   Patient presents with    RECHECK     Follow up. PT reports no new or worsening symptoms.      Vitals:    05/14/25 1139 05/14/25 1144 05/14/25 1148   BP: 133/77 129/79 136/77   BP Location: Right arm Right arm    Patient Position: Sitting Sitting    Cuff Size: Adult Small Adult Small    Pulse: 50     Temp: 98.1  F (36.7  C)     TempSrc: Oral     SpO2: 97%     Weight: 56 kg (123 lb 8 oz)     Height: 1.524 m (5')         BP Readings from Last 3 Encounters:   05/14/25 136/77   09/06/24 (!) 169/85   05/06/24 135/79       /77   Pulse 50   Temp 98.1  F (36.7  C) (Oral)   Ht 1.524 m (5')   Wt 56 kg (123 lb 8 oz)   SpO2 97%   BMI 24.12 kg/m       Sunshine Heymmauricio

## 2025-05-16 ENCOUNTER — ORDERS ONLY (AUTO-RELEASED) (OUTPATIENT)
Dept: INTERNAL MEDICINE | Facility: CLINIC | Age: 85
End: 2025-05-16
Payer: MEDICARE

## 2025-05-16 DIAGNOSIS — R55 SYNCOPE: ICD-10-CM

## 2025-05-16 DIAGNOSIS — R00.1 BRADYCARDIA: ICD-10-CM

## 2025-05-19 ENCOUNTER — PATIENT OUTREACH (OUTPATIENT)
Dept: CARE COORDINATION | Facility: CLINIC | Age: 85
End: 2025-05-19
Payer: MEDICARE

## 2025-05-21 ENCOUNTER — TELEPHONE (OUTPATIENT)
Dept: FAMILY MEDICINE | Facility: CLINIC | Age: 85
End: 2025-05-21
Payer: MEDICARE

## 2025-05-21 NOTE — TELEPHONE ENCOUNTER
[facility] Call Center    Phone Message    May a detailed message be left on voicemail: yes     Reason for Call: Pt is leaving on vacation on  5/29, and returning 5/31 and asking if she should statr heart monitor now or wait until she get back home to start. Please call her at [phone] to discuss option. Thank you     Action Taken: Message routed to:  [facility] ([facility]):      Travel Screening: Not Applicable     Date of Service:

## 2025-06-23 LAB — CV ZIO PRELIM RESULTS: NORMAL

## 2025-06-24 ENCOUNTER — RESULTS FOLLOW-UP (OUTPATIENT)
Dept: FAMILY MEDICINE | Facility: CLINIC | Age: 85
End: 2025-06-24

## 2025-06-24 ENCOUNTER — TELEPHONE (OUTPATIENT)
Dept: CARDIOLOGY | Facility: CLINIC | Age: 85
End: 2025-06-24
Payer: MEDICARE

## 2025-06-24 NOTE — TELEPHONE ENCOUNTER
Sent [email or web address] (1st Attempt) for the patient to call back and schedule the following:    Appointment type: New Cardio  Provider: General MD  Return date: Next available  Specialty phone number: [phone] Opt 1  Additional appointment(s) needed: NA  Additonal Notes: Referral

## 2025-06-26 ENCOUNTER — TELEPHONE (OUTPATIENT)
Dept: CARDIOLOGY | Facility: CLINIC | Age: 85
End: 2025-06-26
Payer: MEDICARE

## 2025-08-20 DIAGNOSIS — E03.9 HYPOTHYROIDISM, UNSPECIFIED TYPE: ICD-10-CM

## 2025-08-20 DIAGNOSIS — E03.8 OTHER SPECIFIED HYPOTHYROIDISM: ICD-10-CM

## 2025-08-21 RX ORDER — LEVOTHYROXINE SODIUM 50 UG/1
50 TABLET ORAL DAILY
Qty: 90 TABLET | Refills: 1 | Status: SHIPPED | OUTPATIENT
Start: 2025-08-21

## (undated) DEVICE — GLOVE BIOGEL PI ULTRATOUCH G SZ 8.0 42180

## (undated) DEVICE — TRAY PAIN INJECTION 97A 640

## (undated) DEVICE — PREP CHLORAPREP W/ORANGE TINT 10.5ML 930715

## (undated) RX ORDER — CLONIDINE HYDROCHLORIDE 0.1 MG/1
TABLET ORAL
Status: DISPENSED
Start: 2024-03-14